# Patient Record
Sex: MALE | Race: WHITE | NOT HISPANIC OR LATINO | Employment: OTHER | URBAN - METROPOLITAN AREA
[De-identification: names, ages, dates, MRNs, and addresses within clinical notes are randomized per-mention and may not be internally consistent; named-entity substitution may affect disease eponyms.]

---

## 2018-05-18 ENCOUNTER — HOSPITAL ENCOUNTER (INPATIENT)
Facility: HOSPITAL | Age: 73
LOS: 4 days | Discharge: HOME | DRG: 189 | End: 2018-05-22
Attending: EMERGENCY MEDICINE | Admitting: INTERNAL MEDICINE
Payer: COMMERCIAL

## 2018-05-18 ENCOUNTER — APPOINTMENT (EMERGENCY)
Dept: RADIOLOGY | Facility: HOSPITAL | Age: 73
DRG: 189 | End: 2018-05-18
Payer: COMMERCIAL

## 2018-05-18 DIAGNOSIS — J96.00 ACUTE RESPIRATORY FAILURE (HCC): ICD-10-CM

## 2018-05-18 DIAGNOSIS — J96.22 ACUTE ON CHRONIC RESPIRATORY FAILURE WITH HYPOXIA AND HYPERCAPNIA (HCC): ICD-10-CM

## 2018-05-18 DIAGNOSIS — J44.1 COPD WITH ACUTE EXACERBATION (HCC): ICD-10-CM

## 2018-05-18 DIAGNOSIS — A41.9 SEPSIS (HCC): Primary | ICD-10-CM

## 2018-05-18 DIAGNOSIS — J44.9 COPD (CHRONIC OBSTRUCTIVE PULMONARY DISEASE) (HCC): ICD-10-CM

## 2018-05-18 DIAGNOSIS — J96.21 ACUTE ON CHRONIC RESPIRATORY FAILURE WITH HYPOXIA AND HYPERCAPNIA (HCC): ICD-10-CM

## 2018-05-18 PROBLEM — Z87.891 HISTORY OF TOBACCO USE: Status: ACTIVE | Noted: 2018-05-18

## 2018-05-18 PROBLEM — R06.82 TACHYPNEA: Status: ACTIVE | Noted: 2018-05-18

## 2018-05-18 PROBLEM — I25.10 CORONARY ARTERY DISEASE WITHOUT ANGINA PECTORIS: Chronic | Status: ACTIVE | Noted: 2018-05-18

## 2018-05-18 PROBLEM — R00.0 TACHYCARDIA: Status: ACTIVE | Noted: 2018-05-18

## 2018-05-18 LAB
ALBUMIN SERPL BCP-MCNC: 3.4 G/DL (ref 3.5–5)
ALP SERPL-CCNC: 89 U/L (ref 46–116)
ALT SERPL W P-5'-P-CCNC: 20 U/L (ref 12–78)
ANION GAP SERPL CALCULATED.3IONS-SCNC: 12 MMOL/L (ref 4–13)
APTT PPP: 37 SECONDS (ref 24–36)
ARTERIAL PATENCY WRIST A: ABNORMAL
AST SERPL W P-5'-P-CCNC: 20 U/L (ref 5–45)
ATRIAL RATE: 106 BPM
ATRIAL RATE: 106 BPM
ATRIAL RATE: 300 BPM
BACTERIA UR QL AUTO: ABNORMAL /HPF
BASE EXCESS BLDA CALC-SCNC: -5 MMOL/L (ref -2–3)
BASOPHILS # BLD AUTO: 0.02 THOUSANDS/ΜL (ref 0–0.1)
BASOPHILS NFR BLD AUTO: 0 % (ref 0–1)
BILIRUB SERPL-MCNC: 2.1 MG/DL (ref 0.2–1)
BILIRUB UR QL STRIP: ABNORMAL
BUN SERPL-MCNC: 23 MG/DL (ref 5–25)
CA-I BLD-SCNC: 1.19 MMOL/L (ref 1.12–1.32)
CALCIUM SERPL-MCNC: 9 MG/DL (ref 8.3–10.1)
CHLORIDE SERPL-SCNC: 92 MMOL/L (ref 100–108)
CLARITY UR: CLEAR
CO2 SERPL-SCNC: 27 MMOL/L (ref 21–32)
COLOR UR: ABNORMAL
CREAT SERPL-MCNC: 0.98 MG/DL (ref 0.6–1.3)
DEPRECATED D DIMER PPP: 581 NG/ML (FEU) (ref 0–424)
EOSINOPHIL # BLD AUTO: 0 THOUSAND/ΜL (ref 0–0.61)
EOSINOPHIL NFR BLD AUTO: 0 % (ref 0–6)
ERYTHROCYTE [DISTWIDTH] IN BLOOD BY AUTOMATED COUNT: 14.4 % (ref 11.6–15.1)
FINE GRAN CASTS URNS QL MICRO: ABNORMAL /LPF
FIO2 GAS DIL.REBREATH: 40 L
GFR SERPL CREATININE-BSD FRML MDRD: 77 ML/MIN/1.73SQ M
GLUCOSE SERPL-MCNC: 115 MG/DL (ref 65–140)
GLUCOSE SERPL-MCNC: 116 MG/DL (ref 65–140)
GLUCOSE UR STRIP-MCNC: NEGATIVE MG/DL
HCO3 BLDA-SCNC: 22.5 MMOL/L (ref 22–28)
HCT VFR BLD AUTO: 39.2 % (ref 36.5–49.3)
HCT VFR BLD CALC: 36 % (ref 36.5–49.3)
HGB BLD-MCNC: 12.9 G/DL (ref 12–17)
HGB BLDA-MCNC: 12.2 G/DL (ref 12–17)
HGB UR QL STRIP.AUTO: ABNORMAL
HYALINE CASTS #/AREA URNS LPF: ABNORMAL /LPF
INR PPP: 1.24 (ref 0.86–1.17)
KETONES UR STRIP-MCNC: ABNORMAL MG/DL
LACTATE SERPL-SCNC: 1.6 MMOL/L (ref 0.5–2)
LACTATE SERPL-SCNC: 2.1 MMOL/L (ref 0.5–2)
LEUKOCYTE ESTERASE UR QL STRIP: NEGATIVE
LYMPHOCYTES # BLD AUTO: 1.17 THOUSANDS/ΜL (ref 0.6–4.47)
LYMPHOCYTES NFR BLD AUTO: 10 % (ref 14–44)
MAGNESIUM SERPL-MCNC: 1.9 MG/DL (ref 1.6–2.6)
MCH RBC QN AUTO: 30.9 PG (ref 26.8–34.3)
MCHC RBC AUTO-ENTMCNC: 32.9 G/DL (ref 31.4–37.4)
MCV RBC AUTO: 94 FL (ref 82–98)
MONOCYTES # BLD AUTO: 2.09 THOUSAND/ΜL (ref 0.17–1.22)
MONOCYTES NFR BLD AUTO: 17 % (ref 4–12)
MUCOUS THREADS UR QL AUTO: ABNORMAL
NEUTROPHILS # BLD AUTO: 9.09 THOUSANDS/ΜL (ref 1.85–7.62)
NEUTS SEG NFR BLD AUTO: 73 % (ref 43–75)
NITRITE UR QL STRIP: NEGATIVE
NON-SQ EPI CELLS URNS QL MICRO: ABNORMAL /HPF
NT-PROBNP SERPL-MCNC: 7728 PG/ML
P AXIS: 84 DEGREES
P AXIS: 91 DEGREES
PCO2 BLD: 24 MMOL/L (ref 21–32)
PCO2 BLD: 50.6 MM HG (ref 36–44)
PH BLD: 7.26 [PH] (ref 7.35–7.45)
PH UR STRIP.AUTO: 5.5 [PH] (ref 4.5–8)
PLATELET # BLD AUTO: 199 THOUSANDS/UL (ref 149–390)
PMV BLD AUTO: 10.8 FL (ref 8.9–12.7)
PO2 BLD: 105 MM HG (ref 75–129)
POTASSIUM BLD-SCNC: 3.8 MMOL/L (ref 3.5–5.3)
POTASSIUM SERPL-SCNC: 4.4 MMOL/L (ref 3.5–5.3)
PR INTERVAL: 154 MS
PROCALCITONIN SERPL-MCNC: 0.27 NG/ML
PROT SERPL-MCNC: 7.9 G/DL (ref 6.4–8.2)
PROT UR STRIP-MCNC: >=300 MG/DL
PROTHROMBIN TIME: 15.2 SECONDS (ref 11.8–14.2)
QRS AXIS: 91 DEGREES
QRS AXIS: 91 DEGREES
QRS AXIS: 97 DEGREES
QRSD INTERVAL: 100 MS
QRSD INTERVAL: 108 MS
QRSD INTERVAL: 112 MS
QT INTERVAL: 306 MS
QT INTERVAL: 322 MS
QT INTERVAL: 326 MS
QTC INTERVAL: 406 MS
QTC INTERVAL: 421 MS
QTC INTERVAL: 433 MS
RBC # BLD AUTO: 4.17 MILLION/UL (ref 3.88–5.62)
RBC #/AREA URNS AUTO: ABNORMAL /HPF
SAMPLE SITE: 4
SAO2 % BLD FROM PO2: 97 % (ref 95–98)
SODIUM BLD-SCNC: 129 MMOL/L (ref 136–145)
SODIUM SERPL-SCNC: 131 MMOL/L (ref 136–145)
SP GR UR STRIP.AUTO: >=1.03 (ref 1–1.03)
SPECIMEN SOURCE: ABNORMAL
T WAVE AXIS: -68 DEGREES
T WAVE AXIS: -77 DEGREES
T WAVE AXIS: -78 DEGREES
TROPONIN I SERPL-MCNC: <0.02 NG/ML
TSH SERPL DL<=0.05 MIU/L-ACNC: 0.69 UIU/ML (ref 0.36–3.74)
UROBILINOGEN UR QL STRIP.AUTO: 2 E.U./DL
VENTRICULAR RATE: 103 BPM
VENTRICULAR RATE: 106 BPM
VENTRICULAR RATE: 106 BPM
WBC # BLD AUTO: 12.37 THOUSAND/UL (ref 4.31–10.16)
WBC #/AREA URNS AUTO: ABNORMAL /HPF

## 2018-05-18 PROCEDURE — 93010 ELECTROCARDIOGRAM REPORT: CPT | Performed by: INTERNAL MEDICINE

## 2018-05-18 PROCEDURE — 80053 COMPREHEN METABOLIC PANEL: CPT | Performed by: PHYSICIAN ASSISTANT

## 2018-05-18 PROCEDURE — 94660 CPAP INITIATION&MGMT: CPT

## 2018-05-18 PROCEDURE — 81001 URINALYSIS AUTO W/SCOPE: CPT

## 2018-05-18 PROCEDURE — 82947 ASSAY GLUCOSE BLOOD QUANT: CPT

## 2018-05-18 PROCEDURE — 93005 ELECTROCARDIOGRAM TRACING: CPT

## 2018-05-18 PROCEDURE — 94668 MNPJ CHEST WALL SBSQ: CPT

## 2018-05-18 PROCEDURE — 87040 BLOOD CULTURE FOR BACTERIA: CPT | Performed by: PHYSICIAN ASSISTANT

## 2018-05-18 PROCEDURE — 83880 ASSAY OF NATRIURETIC PEPTIDE: CPT | Performed by: PHYSICIAN ASSISTANT

## 2018-05-18 PROCEDURE — 84484 ASSAY OF TROPONIN QUANT: CPT | Performed by: PHYSICIAN ASSISTANT

## 2018-05-18 PROCEDURE — 36600 WITHDRAWAL OF ARTERIAL BLOOD: CPT

## 2018-05-18 PROCEDURE — 96365 THER/PROPH/DIAG IV INF INIT: CPT

## 2018-05-18 PROCEDURE — 99285 EMERGENCY DEPT VISIT HI MDM: CPT

## 2018-05-18 PROCEDURE — 85025 COMPLETE CBC W/AUTO DIFF WBC: CPT | Performed by: PHYSICIAN ASSISTANT

## 2018-05-18 PROCEDURE — 83735 ASSAY OF MAGNESIUM: CPT | Performed by: PHYSICIAN ASSISTANT

## 2018-05-18 PROCEDURE — 96361 HYDRATE IV INFUSION ADD-ON: CPT

## 2018-05-18 PROCEDURE — 85610 PROTHROMBIN TIME: CPT | Performed by: PHYSICIAN ASSISTANT

## 2018-05-18 PROCEDURE — 36415 COLL VENOUS BLD VENIPUNCTURE: CPT | Performed by: PHYSICIAN ASSISTANT

## 2018-05-18 PROCEDURE — 99223 1ST HOSP IP/OBS HIGH 75: CPT | Performed by: INTERNAL MEDICINE

## 2018-05-18 PROCEDURE — 85730 THROMBOPLASTIN TIME PARTIAL: CPT | Performed by: PHYSICIAN ASSISTANT

## 2018-05-18 PROCEDURE — 87631 RESP VIRUS 3-5 TARGETS: CPT | Performed by: PHYSICIAN ASSISTANT

## 2018-05-18 PROCEDURE — 94644 CONT INHLJ TX 1ST HOUR: CPT

## 2018-05-18 PROCEDURE — 71045 X-RAY EXAM CHEST 1 VIEW: CPT

## 2018-05-18 PROCEDURE — 85379 FIBRIN DEGRADATION QUANT: CPT | Performed by: PHYSICIAN ASSISTANT

## 2018-05-18 PROCEDURE — 83605 ASSAY OF LACTIC ACID: CPT | Performed by: PHYSICIAN ASSISTANT

## 2018-05-18 PROCEDURE — 94640 AIRWAY INHALATION TREATMENT: CPT

## 2018-05-18 PROCEDURE — 82330 ASSAY OF CALCIUM: CPT

## 2018-05-18 PROCEDURE — 84443 ASSAY THYROID STIM HORMONE: CPT | Performed by: PHYSICIAN ASSISTANT

## 2018-05-18 PROCEDURE — 96375 TX/PRO/DX INJ NEW DRUG ADDON: CPT

## 2018-05-18 PROCEDURE — 94645 CONT INHLJ TX EACH ADDL HOUR: CPT

## 2018-05-18 PROCEDURE — 94760 N-INVAS EAR/PLS OXIMETRY 1: CPT

## 2018-05-18 PROCEDURE — 85014 HEMATOCRIT: CPT

## 2018-05-18 PROCEDURE — 84295 ASSAY OF SERUM SODIUM: CPT

## 2018-05-18 PROCEDURE — 84145 PROCALCITONIN (PCT): CPT | Performed by: PHYSICIAN ASSISTANT

## 2018-05-18 PROCEDURE — 84132 ASSAY OF SERUM POTASSIUM: CPT

## 2018-05-18 PROCEDURE — 82803 BLOOD GASES ANY COMBINATION: CPT

## 2018-05-18 RX ORDER — METHYLPREDNISOLONE SODIUM SUCCINATE 125 MG/2ML
60 INJECTION, POWDER, LYOPHILIZED, FOR SOLUTION INTRAMUSCULAR; INTRAVENOUS EVERY 8 HOURS SCHEDULED
Status: DISCONTINUED | OUTPATIENT
Start: 2018-05-18 | End: 2018-05-20

## 2018-05-18 RX ORDER — ALBUTEROL SULFATE 2.5 MG/3ML
2.5 SOLUTION RESPIRATORY (INHALATION) EVERY 6 HOURS PRN
COMMUNITY
End: 2018-09-24

## 2018-05-18 RX ORDER — ASPIRIN 81 MG/1
81 TABLET ORAL DAILY
Status: DISCONTINUED | OUTPATIENT
Start: 2018-05-19 | End: 2018-05-22 | Stop reason: HOSPADM

## 2018-05-18 RX ORDER — FUROSEMIDE 10 MG/ML
40 INJECTION INTRAMUSCULAR; INTRAVENOUS ONCE
Status: COMPLETED | OUTPATIENT
Start: 2018-05-18 | End: 2018-05-18

## 2018-05-18 RX ORDER — LEVALBUTEROL 1.25 MG/.5ML
1.25 SOLUTION, CONCENTRATE RESPIRATORY (INHALATION)
Status: DISCONTINUED | OUTPATIENT
Start: 2018-05-18 | End: 2018-05-18

## 2018-05-18 RX ORDER — LISINOPRIL 5 MG/1
5 TABLET ORAL DAILY
COMMUNITY
End: 2018-06-28

## 2018-05-18 RX ORDER — CARVEDILOL 12.5 MG/1
12.5 TABLET ORAL 2 TIMES DAILY WITH MEALS
Status: DISCONTINUED | OUTPATIENT
Start: 2018-05-18 | End: 2018-05-22 | Stop reason: HOSPADM

## 2018-05-18 RX ORDER — FUROSEMIDE 20 MG/1
20 TABLET ORAL DAILY
COMMUNITY
End: 2018-06-28

## 2018-05-18 RX ORDER — ALBUTEROL SULFATE 2.5 MG/3ML
10 SOLUTION RESPIRATORY (INHALATION) ONCE
Status: COMPLETED | OUTPATIENT
Start: 2018-05-18 | End: 2018-05-18

## 2018-05-18 RX ORDER — LEVALBUTEROL 1.25 MG/.5ML
1.25 SOLUTION, CONCENTRATE RESPIRATORY (INHALATION)
Status: DISCONTINUED | OUTPATIENT
Start: 2018-05-18 | End: 2018-05-20

## 2018-05-18 RX ORDER — METHYLPREDNISOLONE SODIUM SUCCINATE 40 MG/ML
40 INJECTION, POWDER, LYOPHILIZED, FOR SOLUTION INTRAMUSCULAR; INTRAVENOUS EVERY 8 HOURS SCHEDULED
Status: DISCONTINUED | OUTPATIENT
Start: 2018-05-18 | End: 2018-05-18

## 2018-05-18 RX ORDER — MAGNESIUM SULFATE HEPTAHYDRATE 40 MG/ML
2 INJECTION, SOLUTION INTRAVENOUS ONCE
Status: COMPLETED | OUTPATIENT
Start: 2018-05-18 | End: 2018-05-18

## 2018-05-18 RX ORDER — ROSUVASTATIN CALCIUM 10 MG/1
10 TABLET, COATED ORAL DAILY
COMMUNITY
End: 2018-06-28 | Stop reason: ALTCHOICE

## 2018-05-18 RX ORDER — METHYLPREDNISOLONE SODIUM SUCCINATE 125 MG/2ML
125 INJECTION, POWDER, LYOPHILIZED, FOR SOLUTION INTRAMUSCULAR; INTRAVENOUS ONCE
Status: COMPLETED | OUTPATIENT
Start: 2018-05-18 | End: 2018-05-18

## 2018-05-18 RX ORDER — ALBUTEROL SULFATE 2.5 MG/3ML
SOLUTION RESPIRATORY (INHALATION)
Status: COMPLETED
Start: 2018-05-18 | End: 2018-05-18

## 2018-05-18 RX ORDER — PRAVASTATIN SODIUM 40 MG
40 TABLET ORAL
Status: DISCONTINUED | OUTPATIENT
Start: 2018-05-18 | End: 2018-05-22 | Stop reason: HOSPADM

## 2018-05-18 RX ORDER — LORAZEPAM 2 MG/ML
0.5 INJECTION INTRAMUSCULAR ONCE
Status: COMPLETED | OUTPATIENT
Start: 2018-05-18 | End: 2018-05-18

## 2018-05-18 RX ORDER — CARVEDILOL 12.5 MG/1
12.5 TABLET ORAL 2 TIMES DAILY WITH MEALS
COMMUNITY
End: 2019-09-23 | Stop reason: SDUPTHER

## 2018-05-18 RX ORDER — FUROSEMIDE 20 MG/1
20 TABLET ORAL 2 TIMES DAILY
Status: DISCONTINUED | OUTPATIENT
Start: 2018-05-18 | End: 2018-05-20

## 2018-05-18 RX ORDER — ASPIRIN 81 MG/1
81 TABLET ORAL DAILY
COMMUNITY
End: 2019-04-04 | Stop reason: ALTCHOICE

## 2018-05-18 RX ORDER — LISINOPRIL 5 MG/1
5 TABLET ORAL DAILY
Status: DISCONTINUED | OUTPATIENT
Start: 2018-05-19 | End: 2018-05-22 | Stop reason: HOSPADM

## 2018-05-18 RX ORDER — MORPHINE SULFATE 2 MG/ML
1 INJECTION, SOLUTION INTRAMUSCULAR; INTRAVENOUS ONCE
Status: COMPLETED | OUTPATIENT
Start: 2018-05-18 | End: 2018-05-18

## 2018-05-18 RX ADMIN — MAGNESIUM SULFATE HEPTAHYDRATE 2 G: 40 INJECTION, SOLUTION INTRAVENOUS at 16:28

## 2018-05-18 RX ADMIN — SODIUM CHLORIDE 1100 ML: 0.9 INJECTION, SOLUTION INTRAVENOUS at 13:51

## 2018-05-18 RX ADMIN — ALBUTEROL SULFATE 10 MG: 2.5 SOLUTION RESPIRATORY (INHALATION) at 15:00

## 2018-05-18 RX ADMIN — LORAZEPAM 0.5 MG: 2 INJECTION INTRAMUSCULAR; INTRAVENOUS at 16:58

## 2018-05-18 RX ADMIN — MORPHINE SULFATE 1 MG: 2 INJECTION, SOLUTION INTRAMUSCULAR; INTRAVENOUS at 18:28

## 2018-05-18 RX ADMIN — Medication 1 MG: at 12:11

## 2018-05-18 RX ADMIN — ALBUTEROL SULFATE 10 MG: 2.5 SOLUTION RESPIRATORY (INHALATION) at 12:11

## 2018-05-18 RX ADMIN — METHYLPREDNISOLONE SODIUM SUCCINATE 125 MG: 125 INJECTION, POWDER, FOR SOLUTION INTRAMUSCULAR; INTRAVENOUS at 12:27

## 2018-05-18 RX ADMIN — SODIUM CHLORIDE 1000 ML: 0.9 INJECTION, SOLUTION INTRAVENOUS at 12:27

## 2018-05-18 RX ADMIN — AZITHROMYCIN MONOHYDRATE 500 MG: 500 INJECTION, POWDER, LYOPHILIZED, FOR SOLUTION INTRAVENOUS at 15:00

## 2018-05-18 RX ADMIN — IPRATROPIUM BROMIDE 1 MG: 0.5 SOLUTION RESPIRATORY (INHALATION) at 15:00

## 2018-05-18 RX ADMIN — CEFTRIAXONE 1000 MG: 1 INJECTION, SOLUTION INTRAVENOUS at 13:37

## 2018-05-18 RX ADMIN — IPRATROPIUM BROMIDE 1 MG: 0.5 SOLUTION RESPIRATORY (INHALATION) at 12:11

## 2018-05-18 RX ADMIN — LEVALBUTEROL HYDROCHLORIDE 1.25 MG: 1.25 SOLUTION, CONCENTRATE RESPIRATORY (INHALATION) at 20:09

## 2018-05-18 RX ADMIN — IPRATROPIUM BROMIDE 0.5 MG: 0.5 SOLUTION RESPIRATORY (INHALATION) at 20:09

## 2018-05-18 RX ADMIN — FUROSEMIDE 40 MG: 10 INJECTION, SOLUTION INTRAMUSCULAR; INTRAVENOUS at 18:29

## 2018-05-18 RX ADMIN — METHYLPREDNISOLONE SODIUM SUCCINATE 60 MG: 125 INJECTION, POWDER, FOR SOLUTION INTRAMUSCULAR; INTRAVENOUS at 20:17

## 2018-05-18 NOTE — SEPSIS NOTE
Sepsis Note   Nakita Moscoso 71 y o  male MRN: [id number]  Unit/Bed#: ED 09 Encounter: [id number]            Initial Sepsis Screening     Row Name 05/18/18 8887                Is the patient's history suggestive of a new or worsening infection? (!)  Yes (Proceed)  -JG        Suspected source of infection pneumonia  -JG        Are two or more of the following signs & symptoms of infection both present and new to the patient?         Indicate SIRS criteria Tachycardia > 90 bpm;Tachypnea > 20 resp per min;Leukocytosis (WBC > 23652 IJL)  -JG        If the answer is yes to both questions, suspicion of sepsis is present          If severe sepsis is present AND tissue hypoperfusion perists in the hour after fluid resuscitation or lactate > 4, the patient meets criteria for SEPTIC SHOCK          Are any of the following organ dysfunction criteria present within 6 hours of suspected infection and SIRS criteria that are NOT considered to be chronic conditions? (!)  Yes  -JG        Organ dysfunction Lactate > 2 0 mmol/L;Acute respiratory failure (new need for invasive or non-invasive mechanical ventilation)  -JG        Date of presentation of severe sepsis          Time of presentation of severe sepsis          Tissue hypoperfusion persists in the hour after crystalloid fluid administration, evidenced, by either:          Was hypotension present within one hour of the conclusion of crystalloid fluid administration?           Date of presentation of septic shock          Time of presentation of septic shock            User Key  (r) = Recorded By, (t) = Taken By, (c) = Cosigned By    [address] Name Provider Type    [address] PA-C Physician Assistant

## 2018-05-18 NOTE — H&P
History and Physical - Critical Care   Taras Ruiz 71 y o  male MRN: [id number]  Unit/Bed#: ED 09 Encounter: [id number]    Reason for Admission / Chief Complaint:   Acute / Chronic Hypoxic / Hypercarbic Respiratory Failure  /    Shortness of Breath    History of Present Illness:  Taras Ruiz is a 71 y o  male who presents to [facility] emergency room from home with the chief complaint of shortness of breath  Patient reports that he has an extensive past medical history as he was a previous smoker quitting 1 year ago  He states that he is on continual home oxygen of 2 5 L but has not been increasing usage  He reports over the course of this past week he has had increasing cough with shortness of breath despite using his DuoNeb nebulized breathing treatments as well as his Advair  He states up until earlier today his cough was nonproductive however is at this time producing white to yellow and at times green sputum  He denies any fevers, chills, chest pain, leg pain or swelling  He presented to ER for further evaluation  Upon arrival in the emergency department patient was on his 2 5 L via nasal cannula noting an SpO2 of 77%  Patient was placed on BiPAP after an ABG was obtained revealing  7 25/50/105  Chest x-ray was obtained and they were concerned regarding a pneumonia patient was provided ceftriaxone and azithromycin  Additionally initial lactate of 2 1 patient was provided IV fluids  He was provided 125 mg of Solu-Medrol as well as 10 mg albuterol and Atrovent breathing treatment  Since completion patient reports that he is feeling better  Patient was presented to the medical critical care team for further evaluation and hospital admission  History obtained from the patient      Past Medical History:  Past Medical History:   Diagnosis Date    COPD [facility]chronic obstructive pulmonary disease) [facility] [address] [facility]     [facility]        Past Surgical History:  Past Surgical History: Procedure Laterality Date    WISDOM TOOTH EXTRACTION         Past Family History:  History reviewed  No pertinent family history  Social History:  History   Smoking Status    Former Smoker   Smokeless Tobacco    Never Used     History   Alcohol Use    2 4 oz/week    4 Glasses of wine per week     History   Drug Use No     Marital Status: Single  Exercise History: None    Medications:  Current Facility-Administered Medications   Medication Dose Route Frequency    azithromycin (ZITHROMAX) 500 mg in sodium chloride 0 9% 250mL IVPB 500 mg  500 mg Intravenous Once    ipratropium (ATROVENT) 0 02 % inhalation solution 0 5 mg  0 5 mg Nebulization Q6H    levalbuterol (XOPENEX) inhalation solution 1 25 mg  1 25 mg Nebulization Q6H    methylPREDNISolone sodium succinate (Solu-MEDROL) injection 40 mg  40 mg Intravenous Q8H [address]    sodium chloride 0 9 % bolus 1,100 mL  1,100 mL Intravenous Once     Home medications:  Prior to Admission medications    Not on File     Allergies:  No Known Allergies    ROS:   Review of Systems   Constitutional: Positive for activity change  Negative for appetite change, chills, diaphoresis, fatigue, fever and unexpected weight change  HENT: Negative for congestion, dental problem, drooling, ear pain, postnasal drip, rhinorrhea, sinus pain, sinus pressure, sneezing, sore throat and trouble swallowing  Eyes: Negative  Respiratory: Positive for cough and shortness of breath  Negative for apnea, choking, chest tightness, wheezing and stridor  Cardiovascular: Negative for chest pain, palpitations and leg swelling  Gastrointestinal: Negative for abdominal distention, abdominal pain, diarrhea, nausea and vomiting  Endocrine: Negative  Genitourinary: Negative for difficulty urinating, dysuria, frequency and urgency  Musculoskeletal: Negative for arthralgias, back pain, myalgias and neck pain  Skin: Negative  Allergic/Immunologic: Negative      Neurological: Negative for dizziness and headaches  Hematological: Negative  Psychiatric/Behavioral: Negative  Vitals:  Vitals:    18 1154 18 1206 18 1211 18 1300   BP: 138/70   140/90   BP Location: Left arm      Pulse: (!) 108   (!) 106   Resp: (!) 30      Temp:  97 7 °F (36 5 °C)     TempSrc:  Oral     SpO2:   93% 96%   Weight:  69 1 kg (152 lb 5 4 oz)       Temperature:   Temp (24hrs), Av 7 °F (36 5 °C), Min:97 7 °F (36 5 °C), Max:97 7 °F (36 5 °C)    Current Temperature: 97 7 °F (36 5 °C)    Weights:   IBW: -88 kg  There is no height or weight on file to calculate BMI  Hemodynamic Monitoring:  N/A     Non-Invasive/Invasive Ventilation Settings:  Respiratory    Lab Data (Last 4 hours)    None         O2/Vent Data (Last 4 hours)       1211          Non-Invasive Ventilation Mode BiPAP                 No results found for: PHART, OWZ0LXX, PO2ART, VLM8GUO, G6DZQRYS, BEART, SOURCE  SpO2: SpO2: 96 %     Physical Exam:  Physical Exam     General: 70 y/o M in ER bed room 9 with son at bedside  Currently on BiPAP appearing tachypnic, however awake, alert and oriented  HEENT: Normocephalic, atraumatic  PERRL, EOMI, sclera anicteric, conjunctiva pink, oropharynx patent, mucous membranes dry, tolerating secretions  Neck: Supple, trachea midline, no cervical adenopathy  Heart: RRR without murmur, rub, or gallop, although apically tachycardic  Lungs: currently on BiPAP, clear and equal all fields bilaterally post bronchodilator therapy, slightly decreased; however no wheezing currently, no rales or rhonchi  Abd: Soft, non-tender, no guarding, rebound, or peritoneal signs, active bs noted  Ext: moving upper and lower extremities, distal pulses equal all fields bilaterally, no homans sign, no edema  Back: no CVA tenderness  Neuro: GCS 15, conscious, alert and oriented   Non-focal exam    Labs:    Results from last 7 days  Lab Units 18  1252 18  1219   WBC Thousand/uL  --  12 37* HEMOGLOBIN g/dL  --  12 9   I STAT HEMOGLOBIN g/dl 12 2  --    HEMATOCRIT %  --  39 2   PLATELETS Thousands/uL  --  199   NEUTROS PCT %  --  73   MONOS PCT %  --  17*       Results from last 7 days  Lab Units 18  1252 18  1219   SODIUM mmol/L  --  131*   POTASSIUM mmol/L  --  4 4   CHLORIDE mmol/L  --  92*   CO2 mmol/L  --  27   BUN mg/dL  --  23   CREATININE mg/dL  --  0 98   CALCIUM mg/dL  --  9 0   ANION GAP mmol/L  --  12   ALBUMIN g/dL  --  3 4*   BILIRUBIN TOTAL mg/dL  --  2 10*   ALK PHOS U/L  --  89   ALT U/L  --  20   AST U/L  --  20   GLUCOSE RANDOM mg/dL  --  116   GLUCOSE, ISTAT mg/dl 115  --        Results from last 7 days  Lab Units 18  1219   MAGNESIUM mg/dL 1 9        Results from last 7 days  Lab Units 18  1219   INR  1 24*   PTT seconds 37*       Results from last 7 days  Lab Units 18  1219   TROPONIN I ng/mL <0 02       Results from last 7 days  Lab Units 18  1219   LACTIC ACID mmol/L 2 1*     AB 25/50/105    Imaging: CXR: No active cardiopulmonary disease, no appreciated infiltrate I have personally reviewed pertinent reports  and I have personally reviewed pertinent films in PACS  EKG: Telemetry and 12 lead EKG reviewed, Sinus Tachycardia, no STEMI, no ectopy This was personally reviewed by myself  Micro:  No results found for: Renato Jeronimo, WOUNDZACK, [facility]    ______________________________________________________________________    Assessment:     1  Acute / Chronic Hypoxic / Hypercapnic Respiratory failure  2  COPD with Acute exacerbation  3  Bronchospasm  4  Tachypnea  5  Tachycardia  6  Lactic Acidosis  7  CAD  8  History of Tobacco abuse      Plan:     Neuro: continue to monitor mental status, currently no acute issue  Delirium precautions, perform daily CAM-ICU  Perform good sleep hygiene    CV: continual telemetry monitoring, currently HD stable  CAD history s/p Angioplasty many years ago    Initial troponin <0 02     Pulm: continue SpO2 monitoring maintain >88%  s/p bronchodilator therapy patient moving good air and without wheezing; transition to high flow NC  Considered other etiologies of patients symptoms, will obtain proBNP, Ddimer as well as a procalcitonin  CXR obtained on admission reviewed, there does not appear to be evidence of Pneumonia  However true etiology maybe bronchospasm as shortly after transition to HiFlo NC, notified by primary RN patient complaining of not being "able to breath"  Arrived at bedside to find patient without air movement, very tight with intercostal retractions  Immediately placed patient back on BiPAP contacted respiratory therapy for 10mg Albuterol / 1mg Atrovent stat  Patient has already received 125mg solumedrol, will continue 60mg q8h, Xopenex / Atrovent q6h and closely monitor  Patient does report previously on chronic home steroids; however discontinued in Dec 2017 2/2 weight gain  Additionally reported to have new patient visit with Dr Fco White in July  In lieu of clinical decompensation above believing bronchospasm, will hold off on Chest CT at this time  GI: NPO at this time 2/2 BiPAP    : strict I/O and U/O monitoring    F/E/N: replete lytes prn, IV maintenance fluids not necessary    ID: monitor temps as well as WBC count, CXR does not reveal PNA  Patient provided ceftriaxone and Azithromycin in ER, doubtful of infectious etiology at this time  Will continue to monitor off antibiotics  Will obtain urine strep and legionella and follow up blood culture data    Heme: monitor Hgb/Plt, place on Lovenox chemical DVT PPx as well as bilateral venodynes    Endo: no DM history, will monitor serum glucose    Msk/Skin: reposition and turn q2h while in bed, encourage early ambulation / oob to chair    Disposition: admit [facility] service of Dr Margarito Branham    The above Assessment/Plan discussed with him and he is in agreement as outlined above    Counseling / Coordination of Care  Total time spent today 52 minutes  Greater than 50% of total time was spent with the patient and / or family counseling and / or coordination of care  A description of the counseling / coordination of care: review of EMR, development of care and treatment plan as well as direct bedside management of his acute decompensation while in the ER after transition to hiflo NC     ______________________________________________________________________    VTE Pharmacologic Prophylaxis: Enoxaparin (Lovenox)  VTE Mechanical Prophylaxis: sequential compression device    Invasive lines and devices: Invasive Devices     Peripheral Intravenous Line            Peripheral IV 05/18/18 Right Antecubital less than 1 day                Code Status: Level 1 - Full Code  POA:    POLST:      Given critical illness, patient length of stay will require greater than two midnights  Portions of the record may have been created with voice recognition software  Occasional wrong word or "sound a like" substitutions may have occurred due to the inherent limitations of voice recognition software  Read the chart carefully and recognize, using context, where substitutions have occurred        Betty Matias PA-C

## 2018-05-18 NOTE — ED PROVIDER NOTES
History  Chief Complaint   Patient presents with    Shortness of Breath     C/o SOB x2-3 days  Hx of COPD, recently started with a wet cough  Uses O2 continuously at home, having to increase O2  Patient presents emergency room for an exacerbation of his COPD  He states over the past 3 days he has noticed a productive cough with yellow sputum  His cough is very coarse  He has noticed increasing oxygen demand  He normally wears 2 L of nasal cannula and has increased it to 3  He has had increasing shortness of breath that breast and that is worse with exertion  He denies any chest pain  He denies any nasal congestion or postnasal drip  He denies any sore throat  Denies any abdominal pain, nausea, vomiting, diarrhea or constipation  He denies any black tarry stools or bright red blood per rectum  His pulse ox upon arrival was 77%  He was immediately placed on a CPAP and is tolerating the CPAP well  He was given a heart neb  Past medical history is positive for COPD and a previous MI  History provided by:  Patient  Shortness of Breath   Severity:  Severe  Onset quality:  Gradual  Timing:  Constant  Progression:  Worsening  Chronicity:  Chronic  Context: activity and URI    Relieved by:  Nothing  Worsened by:  Deep breathing, exertion, activity and coughing  Ineffective treatments:  Oxygen (Nebulizers at home )  Associated symptoms: cough and sputum production    Associated symptoms: no abdominal pain, no chest pain, no claudication, no diaphoresis, no ear pain, no fever, no headaches, no hemoptysis, no neck pain, no PND, no rash, no sore throat, no syncope, no swollen glands, no vomiting and no wheezing    Risk factors: no recent alcohol use, no family hx of DVT, no hx of PE/DVT, no obesity, no oral contraceptive use, no prolonged immobilization, no recent surgery and no tobacco use        Prior to Admission Medications   Prescriptions Last Dose Informant Patient Reported? Taking?    albuterol (2 5 mg/3 mL) 0 083 % nebulizer solution 5/18/2018 at Unknown time  Yes Yes   Sig: Take 2 5 mg by nebulization every 6 (six) hours as needed for wheezing   aspirin (ECOTRIN LOW STRENGTH) 81 mg EC tablet 5/18/2018 at Unknown time  Yes Yes   Sig: Take 81 mg by mouth daily   carvedilol (COREG) 12 5 mg tablet 5/18/2018 at Unknown time  Yes Yes   Sig: Take 12 5 mg by mouth 2 (two) times a day with meals   fluticasone-salmeterol (ADVAIR) 250-50 mcg/dose inhaler 5/18/2018 at Unknown time  Yes Yes   Sig: Inhale 1 puff every 12 (twelve) hours   furosemide (LASIX) 20 mg tablet 5/18/2018 at Unknown time  Yes Yes   Sig: Take 20 mg by mouth 2 (two) times a day   lisinopril (ZESTRIL) 5 mg tablet 5/18/2018 at Unknown time  Yes Yes   Sig: Take 5 mg by mouth daily   rosuvastatin (CRESTOR) 10 MG tablet 5/18/2018 at Unknown time  Yes Yes   Sig: Take 10 mg by mouth daily      Facility-Administered Medications: None       Past Medical History:   Diagnosis Date    COPD [facility]chronic obstructive [facility] disease) ([facility] [address] [facility]     [facility]        Past Surgical History:   Procedure Laterality Date    WISDOM TOOTH EXTRACTION         History reviewed  No pertinent family history  I have reviewed and agree with the history as documented  Social History   Substance Use Topics    Smoking status: Former Smoker    Smokeless tobacco: Never Used    Alcohol use 2 4 oz/week     4 Glasses of wine per week        Review of Systems   Constitutional: Positive for activity change and fatigue  Negative for chills, diaphoresis and fever  HENT: Negative for congestion, ear discharge, ear pain, hearing loss, mouth sores, postnasal drip, rhinorrhea, sneezing, sore throat and trouble swallowing  Eyes: Negative for pain, discharge, redness and itching  Respiratory: Positive for cough, sputum production and shortness of breath  Negative for hemoptysis, chest tightness and wheezing      Cardiovascular: Negative for chest pain, palpitations, claudication, leg swelling, syncope and PND  Gastrointestinal: Negative for abdominal pain, nausea and vomiting  Endocrine: Negative for cold intolerance, heat intolerance, polydipsia, polyphagia and polyuria  Genitourinary: Negative for dysuria, frequency and urgency  Musculoskeletal: Negative for arthralgias, back pain, gait problem and neck pain  Skin: Negative for color change and rash  Neurological: Negative for headaches  Psychiatric/Behavioral: Negative for confusion  All other systems reviewed and are negative  Physical Exam  Physical Exam   Constitutional: He is oriented to person, place, and time  He appears well-developed  He appears distressed  HENT:   Head: Normocephalic  Right Ear: External ear normal    Left Ear: External ear normal    Nose: Nose normal    Mouth/Throat: Oropharynx is clear and moist    Eyes: Conjunctivae are normal  Right eye exhibits no discharge  Left eye exhibits no discharge  Neck: Neck supple  JVD present  No tracheal deviation present  No thyromegaly present  Cardiovascular: Regular rhythm and normal heart sounds  No murmur heard  Tachycardia   Pulmonary/Chest: No stridor  He is in respiratory distress  Distant breath sounds bilaterally  Abdominal: Soft  He exhibits no distension and no mass  There is no tenderness  There is no rebound and no guarding  Musculoskeletal: He exhibits no edema  Lymphadenopathy:     He has no cervical adenopathy  Neurological: He is alert and oriented to person, place, and time  Skin: Skin is warm  Capillary refill takes less than 2 seconds  He is not diaphoretic  Psychiatric: He has a normal mood and affect  His behavior is normal  Judgment and thought content normal    Nursing note and vitals reviewed        Vital Signs  ED Triage Vitals   Temperature Pulse Respirations Blood Pressure SpO2   05/18/18 1206 05/18/18 1154 05/18/18 1154 05/18/18 1154 05/18/18 1151   97 7 °F (36 5 °C) (!) 108 (!) 30 138/70 (!) 77 %      Temp Source Heart Rate Source Patient Position - Orthostatic VS BP Location FiO2 (%)   05/18/18 1206 05/18/18 1154 05/18/18 1154 05/18/18 1154 --   Oral Monitor Sitting Left arm       Pain Score       05/18/18 1452       No Pain           Vitals:    05/18/18 1452 05/18/18 1500 05/18/18 1552 05/18/18 1608   BP: 143/85 143/85 120/83 135/80   Pulse: (!) 110 (!) 108 (!) 107 (!) 111   Patient Position - Orthostatic VS: Sitting Sitting Lying Lying       Visual Acuity      ED Medications  Medications   enoxaparin (LOVENOX) subcutaneous injection 40 mg (not administered)   methylPREDNISolone sodium succinate (Solu-MEDROL) injection 60 mg (not administered)   ipratropium (ATROVENT) 0 02 % inhalation solution 0 5 mg (not administered)   levalbuterol (XOPENEX) inhalation solution 1 25 mg (not administered)   aspirin (ECOTRIN LOW STRENGTH) EC tablet 81 mg (not administered)   carvedilol (COREG) tablet 12 5 mg (not administered)   fluticasone-salmeterol (ADVAIR) 250-50 mcg/dose inhaler 1 puff (not administered)   furosemide (LASIX) tablet 20 mg (not administered)   lisinopril (ZESTRIL) tablet 5 mg (not administered)   pravastatin (PRAVACHOL) tablet 40 mg (not administered)   influenza inactivated quadrivalent vaccine (FLULAVAL) IM injection 0 5 mL (not administered)   albuterol inhalation solution 10 mg (10 mg Nebulization Given 5/18/18 1211)   ipratropium (ATROVENT) 0 02 % inhalation solution 1 mg (1 mg Nebulization Given 5/18/18 1211)   sodium chloride 0 9 % bolus 1,000 mL (0 mL Intravenous Stopped 5/18/18 1506)   methylPREDNISolone sodium succinate (Solu-MEDROL) injection 125 mg (125 mg Intravenous Given 5/18/18 1227)   cefTRIAXone (ROCEPHIN) IVPB (premix) 1,000 mg (0 mg Intravenous Stopped 5/18/18 1509)   azithromycin (ZITHROMAX) 500 mg in sodium chloride 0 9% 250mL IVPB 500 mg (0 mg Intravenous Stopped 5/18/18 1707)   sodium chloride 0 9 % bolus 1,100 mL (0 mL Intravenous Stopped 5/18/18 7248)   albuterol inhalation solution 10 mg (10 mg Nebulization Given 5/18/18 1500)   ipratropium (ATROVENT) 0 02 % inhalation solution 1 mg (1 mg Nebulization Given 5/18/18 1500)   magnesium sulfate 2 g/50 mL IVPB (premix) 2 g (2 g Intravenous New Bag 5/18/18 1628)   LORazepam (ATIVAN) 2 mg/mL injection 0 5 mg (0 5 mg Intravenous Given 5/18/18 1658)       Diagnostic Studies  Results Reviewed     Procedure Component Value Units Date/Time    Procalcitonin [[id number]]  (Abnormal) Collected:  05/18/18 1452    Lab Status:  Final result Specimen:  Blood from Arm, Left Updated:  05/18/18 1706     Procalcitonin 0 27 (H) ng/ml     NT-BNP PRO [[id number]]  (Abnormal) Collected:  05/18/18 1451    Lab Status:  Final result Specimen:  Blood from Arm, Left Updated:  05/18/18 1522     NT-proBNP 7,728 (H) pg/mL     D-dimer, quantitative [[id number]]  (Abnormal) Collected:  05/18/18 1452    Lab Status:  Final result Specimen:  Blood from Arm, Left Updated:  05/18/18 1518     D-Dimer, Quant 581 (H) ng/ml (FEU)     Lactic acid, plasma [[id number]]  (Normal) Collected:  05/18/18 1429    Lab Status:  Final result Specimen:  Blood from Arm, Right Updated:  05/18/18 1452     LACTIC ACID 1 6 mmol/L     Narrative:         Result may be elevated if tourniquet was used during collection      Urine Microscopic [[id number]]  (Abnormal) Collected:  05/18/18 1356    Lab Status:  Final result Specimen:  Urine from Urine, Clean Catch Updated:  05/18/18 1430     RBC, UA None Seen /hpf      WBC, UA 0-1 (A) /hpf      Epithelial Cells Occasional /hpf      Bacteria, UA Moderate (A) /hpf      Hyaline Casts, UA 2-4 (A) /lpf      Fine granular casts 20-30 /lpf      MUCOUS THREADS Occasional (A)    ED Urine Macroscopic [[id number]]  (Abnormal) Collected:  05/18/18 1356    Lab Status:  Final result Specimen:  Urine Updated:  05/18/18 1353     Color, UA Orange     Clarity, UA Clear     pH, UA 5 5     Leukocytes, UA Negative     Nitrite, UA Negative     Protein, UA >=300 (A) mg/dl Glucose, UA Negative mg/dl      Ketones, UA 40 (2+) (A) mg/dl      Urobilinogen, UA 2 0 (A) E U /dl      Bilirubin, UA Interference- unable to analyze (A)     Blood, UA Moderate (A)     Specific Gravity, UA >=1 030    Narrative:       CLINITEK RESULT    Blood culture #1 [[id number]] Collected:  05/18/18 1323    Lab Status: In process Specimen:  Blood from Hand, Right Updated:  05/18/18 1327    Comprehensive metabolic panel [[id number]]  (Abnormal) Collected:  05/18/18 1219    Lab Status:  Final result Specimen:  Blood from Arm, Right Updated:  05/18/18 1258     Sodium 131 (L) mmol/L      Potassium 4 4 mmol/L      Chloride 92 (L) mmol/L      CO2 27 mmol/L      Anion Gap 12 mmol/L      BUN 23 mg/dL      Creatinine 0 98 mg/dL      Glucose 116 mg/dL      Calcium 9 0 mg/dL      AST 20 U/L      ALT 20 U/L      Alkaline Phosphatase 89 U/L      Total Protein 7 9 g/dL      Albumin 3 4 (L) g/dL      Total Bilirubin 2 10 (H) mg/dL      eGFR 77 ml/min/1 73sq m     Narrative:         National Kidney Disease Education Program recommendations are as follows:  GFR calculation is accurate only with a steady state creatinine  Chronic Kidney disease less than 60 ml/min/1 73 sq  meters  Kidney failure less than 15 ml/min/1 73 sq  meters  TSH [[id number]]  (Normal) Collected:  05/18/18 1219    Lab Status:  Final result Specimen:  Blood from Arm, Right Updated:  05/18/18 1258     TSH 3RD GENERATON 0 687 uIU/mL     Narrative:         Patients undergoing fluorescein dye angiography may retain small amounts of fluorescein in the body for 48-72 hours post procedure  Samples containing fluorescein can produce falsely depressed TSH values  If the patient had this procedure,a specimen should be resubmitted post fluorescein clearance      Magnesium [[id number]]  (Normal) Collected:  05/18/18 1219    Lab Status:  Final result Specimen:  Blood from Arm, Right Updated:  05/18/18 1258     Magnesium 1 9 mg/dL     POCT Blood Gas (CG8+) [[id number]] (Abnormal) Collected:  05/18/18 1252    Lab Status:  Final result Updated:  05/18/18 1257     pH, Art i-STAT 7 256 (L)     pCO2, Art i-STAT 50 6 (H) mm HG      pO2, ART i-STAT 105 0 mm HG      BE, i-STAT -5 (L) mmol/L      HCO3, Art i-STAT 22 5 mmol/L      CO2, i-STAT 24 mmol/L      O2 Sat, i-STAT 97 %      SODIUM, I-STAT 129 (L) mmol/l      Potassium, i-STAT 3 8 mmol/L      Calcium, Ionized i-STAT 1 19 mmol/L      Hct, i-STAT 36 (L) %      Hgb, i-STAT 12 2 g/dl      Glucose, i-STAT 115 mg/dl      POC FIO2 40 L      Specimen Type ARTERIAL     SITE 04     KRISTINE TEST Postive Kristine Test    Lactic acid, plasma [[id number]]  (Abnormal) Collected:  05/18/18 1219    Lab Status:  Final result Specimen:  Blood from Arm, Right Updated:  05/18/18 1256     LACTIC ACID 2 1 (HH) mmol/L     Narrative:         Result may be elevated if tourniquet was used during collection  Protime-INR [[id number]]  (Abnormal) Collected:  05/18/18 1219    Lab Status:  Final result Specimen:  Blood from Arm, Right Updated:  05/18/18 1248     Protime 15 2 (H) seconds      INR 1 24 (H)    APTT [[id number]]  (Abnormal) Collected:  05/18/18 1219    Lab Status:  Final result Specimen:  Blood from Arm, Right Updated:  05/18/18 1248     PTT 37 (H) seconds     Troponin I [[id number]]  (Normal) Collected:  05/18/18 1219    Lab Status:  Final result Specimen:  Blood from Arm, Right Updated:  05/18/18 1247     Troponin I <0 02 ng/mL     Narrative:         [employer] Chemistry analyzer 99% cutoff is > 0 04 ng/mL in network labs    o cTnI 99% cutoff is useful only when applied to patients in the clinical setting of myocardial ischemia  o cTnI 99% cutoff should be interpreted in the context of clinical history, ECG findings and possibly cardiac imaging to establish correct diagnosis  o cTnI 99% cutoff may be suggestive but clearly not indicative of a coronary event without the clinical setting of myocardial ischemia      CBC and differential [[id number]]  (Abnormal) Collected:  05/18/18 1219    Lab Status:  Final result Specimen:  Blood from Arm, Right Updated:  05/18/18 1229     WBC 12 37 (H) Thousand/uL      RBC 4 17 Million/uL      Hemoglobin 12 9 g/dL      Hematocrit 39 2 %      MCV 94 fL      MCH 30 9 pg      MCHC 32 9 g/dL      RDW 14 4 %      MPV 10 8 fL      Platelets 344 Thousands/uL      Neutrophils Relative 73 %      Lymphocytes Relative 10 (L) %      Monocytes Relative 17 (H) %      Eosinophils Relative 0 %      Basophils Relative 0 %      Neutrophils Absolute 9 09 (H) Thousands/µL      Lymphocytes Absolute 1 17 Thousands/µL      Monocytes Absolute 2 09 (H) Thousand/µL      Eosinophils Absolute 0 00 Thousand/µL      Basophils Absolute 0 02 Thousands/µL     Blood culture #2 [[id number]] Collected:  05/18/18 1220    Lab Status: In process Specimen:  Blood from Arm, Right Updated:  05/18/18 1229    Influenza A/B and RSV by PCR (indicated for patients >2 mo of age) [[id number]]     Lab Status:  No result Specimen:  Nasopharyngeal from Nasopharyngeal Swab                  XR chest 1 view portable   ED Interpretation by Quang Freeman PA-C (05/18 1315)   EARLY PNEUMONIA LLL      Final Result by Misha Miller DO (05/18 1329)      Mild interstitial thickening within the lung bases with blunting the costophrenic angles  Findings may represent small pleural effusions  No congestive failure or pneumonia  Hyperinflation suggesting underlying COPD              Workstation performed: [id number]                    Procedures  ECG 12 Lead Documentation  Date/Time: 5/18/2018 1:20 PM  Performed by: Sp Michel  Authorized by: Jamaal Peng     Indications / Diagnosis:  RESPIRATORY DISTRESS  ECG reviewed by me, the ED Provider: yes    Patient location:  ED  Previous ECG:     Previous ECG:  Unavailable    Comparison to cardiac monitor: Yes    Rate:     ECG rate:  103    ECG rate assessment: tachycardic    Rhythm:     Rhythm: sinus tachycardia    Ectopy:     Ectopy: PVCs    QRS:     QRS axis:  Right    QRS intervals: Wide  Conduction:     Conduction: abnormal      Abnormal conduction: complete LBBB    ST segments:     ST segments:  Abnormal  T waves:     T waves comment:  Biphasic T-waves in the inferior lateral leads  CriticalCare Time  Performed by: Balbina Lopez  Authorized by: Trung Sweet     Critical care provider statement:     Critical care time (minutes):  60    Critical care start time:  5/18/2018 12:25 PM    Critical care end time:  5/18/2018 1:25 PM    Critical care was necessary to treat or prevent imminent or life-threatening deterioration of the following conditions:  Respiratory failure and sepsis    Critical care was time spent personally by me on the following activities:  Evaluation of patient's response to treatment, examination of patient, ordering and review of laboratory studies, ordering and review of radiographic studies and re-evaluation of patient's condition    I assumed direction of critical care for this patient from another provider in my specialty: no    Comments:      Patient seen by Dr Harika Mcmillan           Phone Contacts  ED Phone Contact    ED Course                         Initial Sepsis Screening     [address] Name 05/18/18 4997                Is the patient's history suggestive of a new or worsening infection? (!)  Yes (Proceed)  -JG        Suspected source of infection pneumonia  -JG        Are two or more of the following signs & symptoms of infection both present and new to the patient?           Indicate SIRS criteria Tachycardia > 90 bpm;Tachypnea > 20 resp per min;Leukocytosis (WBC > 42770 IJL)  -JG        If the answer is yes to both questions, suspicion of sepsis is present          If severe sepsis is present AND tissue hypoperfusion perists in the hour after fluid resuscitation or lactate > 4, the patient meets criteria for SEPTIC SHOCK          Are any of the following organ dysfunction criteria present within 6 hours of suspected infection and SIRS criteria that are NOT considered to be chronic conditions? (!)  Yes  -JG        Organ dysfunction Lactate > 2 0 mmol/L;Acute respiratory failure (new need for invasive or non-invasive mechanical ventilation)  -JG        Date of presentation of severe sepsis          Time of presentation of severe sepsis          Tissue hypoperfusion persists in the hour after crystalloid fluid administration, evidenced, by either:          Was hypotension present within one hour of the conclusion of crystalloid fluid administration?         Date of presentation of septic shock          Time of presentation of septic shock            User Key  (r) = Recorded By, (t) = Taken By, (c) = Cosigned By    [address] Name Provider Type    [address] PA-C Physician Assistant                  MDM  Number of Diagnoses or Management Options  Acute respiratory failure [facility]): new and requires workup  COPD (chronic obstructive pulmonary disease) ([facility] ): new and requires workup  Sepsis [facility]): new and requires workup     Amount and/or Complexity of Data Reviewed  Clinical lab tests: ordered and reviewed  Tests in the radiology section of CPT®: ordered and reviewed  Tests in the medicine section of CPT®: ordered and reviewed    Risk of Complications, Morbidity, and/or Mortality  Presenting problems: high  Diagnostic procedures: high  Management options: high    Patient Progress  Patient progress: stable    The patient presented with a condition in which there was a high probability of imminent or life-threatening deterioration, and critical care services (excluding separately billable procedures) totalled 30-74 minutes          Disposition  Final diagnoses:   Sepsis ([facility] [address] )   COP[facility]ch[facility] ([facility] )   Acute respiratory failure ([facility] )     Time reflects when diagnosis was documented in both MDM as applicable and the Disposition within this note     Time User Action Codes Description Comment    5/18/2018  3:03 PM Javi Nunez Add [A41 9] Sepsis ([facility] )     5/18/2018  3:04 PM Javi Nunez Add [J44 1,  W33 974] Acute exacerbation of COPD with asthma ([place] [address] )     5/18/2018  3:04 PM Javi Nunez Remove [Y43 2,  U73 855] Acute exacerbation of COPD with asthma ([place] [address] )     5/18/2018  3:04 PM Javi Nunez Add [J44 9] COPD (chronic obstructive pulmonary disease) ([place] [address] )     5/18/2018  3:04 PM Javi Nunez Add [J96 00] Acute respiratory failure [facility])       ED Disposition     ED Disposition Condition Comment    Admit  Case was discussed with Doris Gómez  and the patient's admission status was agreed to be Admission Status: inpatient status to the service of Dr Ara Cr   Follow-up Information    None         Current Discharge Medication List      CONTINUE these medications which have NOT CHANGED    Details   albuterol (2 5 mg/3 mL) 0 083 % nebulizer solution Take 2 5 mg by nebulization every 6 (six) hours as needed for wheezing      aspirin (ECOTRIN LOW STRENGTH) 81 mg EC tablet Take 81 mg by mouth daily      carvedilol (COREG) 12 5 mg tablet Take 12 5 mg by mouth 2 (two) times a day with meals      fluticasone-salmeterol (ADVAIR) 250-50 mcg/dose inhaler Inhale 1 puff every 12 (twelve) hours      furosemide (LASIX) 20 mg tablet Take 20 mg by mouth 2 (two) times a day      lisinopril (ZESTRIL) 5 mg tablet Take 5 mg by mouth daily      rosuvastatin (CRESTOR) 10 MG tablet Take 10 mg by mouth daily           No discharge procedures on file      ED Provider  Electronically Signed by           Ivette Blackman PA-C  05/18/18 [address], PA-C  05/18/18 0727

## 2018-05-18 NOTE — ED ATTENDING ATTESTATION
Yeni Valencia MD, saw and evaluated the patient  I have discussed the patient with the resident/non-physician practitioner and agree with the resident's/non-physician practitioner's findings, Plan of Care, and MDM as documented in the resident's/non-physician practitioner's note, except where noted  All available labs and Radiology studies were reviewed  At this point I agree with the current assessment done in the Emergency Department  I have conducted an independent evaluation of this patient a history and physical is as follows:      Critical Care Time  CritCare Time    Procedures     Patient is a 73-year-old male, shortness of breath for the past several days, history of COPD, reports with symptoms similar to prior COPD exacerbations  No fevers, chills, sweats  He does have a mild cough  No vomiting or diarrhea  Medical decision m[age]-year-old male, slight elevation in lactic acid, given his COPD exacerbation, will treat with antibiotics, also, he is currently on BiPAP, feeling better  Admit  Lungs with wheezing throughout, no longer in resp distress as of my time of evaluation  Abd s/nt

## 2018-05-19 LAB
ANION GAP SERPL CALCULATED.3IONS-SCNC: 9 MMOL/L (ref 4–13)
ARTERIAL PATENCY WRIST A: YES
BASE EXCESS BLDA CALC-SCNC: -2 MMOL/L (ref -2–3)
BASE EXCESS BLDA CALC-SCNC: -2.9 MMOL/L
BASOPHILS # BLD AUTO: 0.02 THOUSANDS/ΜL (ref 0–0.1)
BASOPHILS NFR BLD AUTO: 0 % (ref 0–1)
BUN SERPL-MCNC: 26 MG/DL (ref 5–25)
CA-I BLD-SCNC: 1.06 MMOL/L (ref 1.12–1.32)
CA-I BLD-SCNC: 1.18 MMOL/L (ref 1.12–1.32)
CALCIUM SERPL-MCNC: 8.3 MG/DL (ref 8.3–10.1)
CHLORIDE SERPL-SCNC: 96 MMOL/L (ref 100–108)
CO2 SERPL-SCNC: 28 MMOL/L (ref 21–32)
CREAT SERPL-MCNC: 1.1 MG/DL (ref 0.6–1.3)
EOSINOPHIL # BLD AUTO: 0 THOUSAND/ΜL (ref 0–0.61)
EOSINOPHIL NFR BLD AUTO: 0 % (ref 0–6)
ERYTHROCYTE [DISTWIDTH] IN BLOOD BY AUTOMATED COUNT: 14.4 % (ref 11.6–15.1)
FLUAV AG SPEC QL: NORMAL
FLUBV AG SPEC QL: NORMAL
GFR SERPL CREATININE-BSD FRML MDRD: 67 ML/MIN/1.73SQ M
GLUCOSE SERPL-MCNC: 139 MG/DL (ref 65–140)
GLUCOSE SERPL-MCNC: 156 MG/DL (ref 65–140)
HCO3 BLDA-SCNC: 23.8 MMOL/L (ref 22–28)
HCO3 BLDA-SCNC: 26.6 MMOL/L (ref 22–28)
HCT VFR BLD AUTO: 35.1 % (ref 36.5–49.3)
HCT VFR BLD CALC: 35 % (ref 36.5–49.3)
HGB BLD-MCNC: 11.3 G/DL (ref 12–17)
HGB BLDA-MCNC: 11.9 G/DL (ref 12–17)
IPAP: 16
LACTATE SERPL-SCNC: 1.6 MMOL/L (ref 0.5–2)
LYMPHOCYTES # BLD AUTO: 0.68 THOUSANDS/ΜL (ref 0.6–4.47)
LYMPHOCYTES NFR BLD AUTO: 7 % (ref 14–44)
MAGNESIUM SERPL-MCNC: 2.4 MG/DL (ref 1.6–2.6)
MCH RBC QN AUTO: 30.6 PG (ref 26.8–34.3)
MCHC RBC AUTO-ENTMCNC: 32.2 G/DL (ref 31.4–37.4)
MCV RBC AUTO: 95 FL (ref 82–98)
MONOCYTES # BLD AUTO: 1.53 THOUSAND/ΜL (ref 0.17–1.22)
MONOCYTES NFR BLD AUTO: 16 % (ref 4–12)
NEUTROPHILS # BLD AUTO: 7.47 THOUSANDS/ΜL (ref 1.85–7.62)
NEUTS SEG NFR BLD AUTO: 77 % (ref 43–75)
NON VENT- BIPAP: ABNORMAL
O2 CT BLDA-SCNC: 15.9 ML/DL (ref 16–23)
OXYHGB MFR BLDA: 96.3 % (ref 94–97)
PCO2 BLD: 29 MMOL/L (ref 21–32)
PCO2 BLD: 65.5 MM HG (ref 36–44)
PCO2 BLDA: 50 MM HG (ref 36–44)
PEEP MAX SETTING VENT: 6 CM[H2O]
PH BLD: 7.22 [PH] (ref 7.35–7.45)
PH BLDA: 7.3 [PH] (ref 7.35–7.45)
PHOSPHATE SERPL-MCNC: 3.7 MG/DL (ref 2.3–4.1)
PLATELET # BLD AUTO: 178 THOUSANDS/UL (ref 149–390)
PMV BLD AUTO: 10.6 FL (ref 8.9–12.7)
PO2 BLD: 95 MM HG (ref 75–129)
PO2 BLDA: 109.1 MM HG (ref 75–129)
POTASSIUM BLD-SCNC: 3.7 MMOL/L (ref 3.5–5.3)
POTASSIUM SERPL-SCNC: 4 MMOL/L (ref 3.5–5.3)
RBC # BLD AUTO: 3.69 MILLION/UL (ref 3.88–5.62)
RSV B RNA SPEC QL NAA+PROBE: NORMAL
SAO2 % BLD FROM PO2: 95 % (ref 95–98)
SODIUM BLD-SCNC: 132 MMOL/L (ref 136–145)
SODIUM SERPL-SCNC: 133 MMOL/L (ref 136–145)
SPECIMEN SOURCE: ABNORMAL
SPECIMEN SOURCE: ABNORMAL
VENT BIPAP FIO2: 40 %
WBC # BLD AUTO: 9.7 THOUSAND/UL (ref 4.31–10.16)

## 2018-05-19 PROCEDURE — 83735 ASSAY OF MAGNESIUM: CPT | Performed by: INTERNAL MEDICINE

## 2018-05-19 PROCEDURE — 36600 WITHDRAWAL OF ARTERIAL BLOOD: CPT

## 2018-05-19 PROCEDURE — 94760 N-INVAS EAR/PLS OXIMETRY 1: CPT

## 2018-05-19 PROCEDURE — 82330 ASSAY OF CALCIUM: CPT | Performed by: INTERNAL MEDICINE

## 2018-05-19 PROCEDURE — 82803 BLOOD GASES ANY COMBINATION: CPT

## 2018-05-19 PROCEDURE — 85014 HEMATOCRIT: CPT

## 2018-05-19 PROCEDURE — 94664 DEMO&/EVAL PT USE INHALER: CPT

## 2018-05-19 PROCEDURE — 84295 ASSAY OF SERUM SODIUM: CPT

## 2018-05-19 PROCEDURE — 84100 ASSAY OF PHOSPHORUS: CPT | Performed by: INTERNAL MEDICINE

## 2018-05-19 PROCEDURE — 99291 CRITICAL CARE FIRST HOUR: CPT | Performed by: INTERNAL MEDICINE

## 2018-05-19 PROCEDURE — 82330 ASSAY OF CALCIUM: CPT

## 2018-05-19 PROCEDURE — 94669 MECHANICAL CHEST WALL OSCILL: CPT

## 2018-05-19 PROCEDURE — 82805 BLOOD GASES W/O2 SATURATION: CPT | Performed by: PHYSICIAN ASSISTANT

## 2018-05-19 PROCEDURE — 80048 BASIC METABOLIC PNL TOTAL CA: CPT | Performed by: INTERNAL MEDICINE

## 2018-05-19 PROCEDURE — 94640 AIRWAY INHALATION TREATMENT: CPT

## 2018-05-19 PROCEDURE — 83605 ASSAY OF LACTIC ACID: CPT | Performed by: INTERNAL MEDICINE

## 2018-05-19 PROCEDURE — 94668 MNPJ CHEST WALL SBSQ: CPT

## 2018-05-19 PROCEDURE — 82947 ASSAY GLUCOSE BLOOD QUANT: CPT

## 2018-05-19 PROCEDURE — 85025 COMPLETE CBC W/AUTO DIFF WBC: CPT | Performed by: INTERNAL MEDICINE

## 2018-05-19 PROCEDURE — 87205 SMEAR GRAM STAIN: CPT | Performed by: PHYSICIAN ASSISTANT

## 2018-05-19 PROCEDURE — 94660 CPAP INITIATION&MGMT: CPT

## 2018-05-19 PROCEDURE — 87070 CULTURE OTHR SPECIMN AEROBIC: CPT | Performed by: PHYSICIAN ASSISTANT

## 2018-05-19 PROCEDURE — 84132 ASSAY OF SERUM POTASSIUM: CPT

## 2018-05-19 RX ORDER — LEVALBUTEROL 1.25 MG/.5ML
1.25 SOLUTION, CONCENTRATE RESPIRATORY (INHALATION) EVERY 6 HOURS PRN
Status: DISCONTINUED | OUTPATIENT
Start: 2018-05-19 | End: 2018-05-20

## 2018-05-19 RX ORDER — ALBUTEROL SULFATE 2.5 MG/3ML
2.5 SOLUTION RESPIRATORY (INHALATION) EVERY 6 HOURS PRN
Status: DISCONTINUED | OUTPATIENT
Start: 2018-05-19 | End: 2018-05-19

## 2018-05-19 RX ADMIN — PRAVASTATIN SODIUM 40 MG: 40 TABLET ORAL at 17:23

## 2018-05-19 RX ADMIN — LEVALBUTEROL HYDROCHLORIDE 1.25 MG: 1.25 SOLUTION, CONCENTRATE RESPIRATORY (INHALATION) at 15:05

## 2018-05-19 RX ADMIN — IPRATROPIUM BROMIDE 0.5 MG: 0.5 SOLUTION RESPIRATORY (INHALATION) at 01:45

## 2018-05-19 RX ADMIN — LEVALBUTEROL HYDROCHLORIDE 1.25 MG: 1.25 SOLUTION, CONCENTRATE RESPIRATORY (INHALATION) at 01:45

## 2018-05-19 RX ADMIN — IPRATROPIUM BROMIDE 0.5 MG: 0.5 SOLUTION RESPIRATORY (INHALATION) at 21:28

## 2018-05-19 RX ADMIN — IPRATROPIUM BROMIDE 0.5 MG: 0.5 SOLUTION RESPIRATORY (INHALATION) at 00:20

## 2018-05-19 RX ADMIN — METHYLPREDNISOLONE SODIUM SUCCINATE 60 MG: 125 INJECTION, POWDER, FOR SOLUTION INTRAMUSCULAR; INTRAVENOUS at 22:32

## 2018-05-19 RX ADMIN — IPRATROPIUM BROMIDE 0.5 MG: 0.5 SOLUTION RESPIRATORY (INHALATION) at 15:05

## 2018-05-19 RX ADMIN — FUROSEMIDE 20 MG: 20 TABLET ORAL at 17:23

## 2018-05-19 RX ADMIN — LEVALBUTEROL HYDROCHLORIDE 1.25 MG: 1.25 SOLUTION, CONCENTRATE RESPIRATORY (INHALATION) at 11:31

## 2018-05-19 RX ADMIN — METHYLPREDNISOLONE SODIUM SUCCINATE 60 MG: 125 INJECTION, POWDER, FOR SOLUTION INTRAMUSCULAR; INTRAVENOUS at 05:30

## 2018-05-19 RX ADMIN — LEVALBUTEROL HYDROCHLORIDE 1.25 MG: 1.25 SOLUTION, CONCENTRATE RESPIRATORY (INHALATION) at 18:15

## 2018-05-19 RX ADMIN — LISINOPRIL 5 MG: 5 TABLET ORAL at 09:45

## 2018-05-19 RX ADMIN — FLUTICASONE PROPIONATE AND SALMETEROL 1 PUFF: 50; 250 POWDER RESPIRATORY (INHALATION) at 09:52

## 2018-05-19 RX ADMIN — METHYLPREDNISOLONE SODIUM SUCCINATE 60 MG: 125 INJECTION, POWDER, FOR SOLUTION INTRAMUSCULAR; INTRAVENOUS at 13:40

## 2018-05-19 RX ADMIN — CARVEDILOL 12.5 MG: 12.5 TABLET, FILM COATED ORAL at 09:54

## 2018-05-19 RX ADMIN — ASPIRIN 81 MG: 81 TABLET, COATED ORAL at 09:45

## 2018-05-19 RX ADMIN — IPRATROPIUM BROMIDE 0.5 MG: 0.5 SOLUTION RESPIRATORY (INHALATION) at 11:31

## 2018-05-19 RX ADMIN — ENOXAPARIN SODIUM 40 MG: 40 INJECTION SUBCUTANEOUS at 09:45

## 2018-05-19 RX ADMIN — FUROSEMIDE 20 MG: 20 TABLET ORAL at 09:45

## 2018-05-19 RX ADMIN — CARVEDILOL 12.5 MG: 12.5 TABLET, FILM COATED ORAL at 17:23

## 2018-05-19 RX ADMIN — IPRATROPIUM BROMIDE 0.5 MG: 0.5 SOLUTION RESPIRATORY (INHALATION) at 07:19

## 2018-05-19 RX ADMIN — LEVALBUTEROL HYDROCHLORIDE 1.25 MG: 1.25 SOLUTION, CONCENTRATE RESPIRATORY (INHALATION) at 00:20

## 2018-05-19 RX ADMIN — LEVALBUTEROL HYDROCHLORIDE 1.25 MG: 1.25 SOLUTION, CONCENTRATE RESPIRATORY (INHALATION) at 21:28

## 2018-05-19 RX ADMIN — LEVALBUTEROL HYDROCHLORIDE 1.25 MG: 1.25 SOLUTION, CONCENTRATE RESPIRATORY (INHALATION) at 07:19

## 2018-05-19 RX ADMIN — FLUTICASONE PROPIONATE AND SALMETEROL 1 PUFF: 50; 250 POWDER RESPIRATORY (INHALATION) at 20:50

## 2018-05-19 RX ADMIN — IPRATROPIUM BROMIDE 0.5 MG: 0.5 SOLUTION RESPIRATORY (INHALATION) at 18:15

## 2018-05-19 NOTE — PROGRESS NOTES
Progress Note - Critical Care   Chilo Barkley 71 y o  male MRN: [id number]  Unit/Bed#:  Encounter: [id number]    Impression:  Principal Problem:    Acute on chronic respiratory failure with hypoxia and hypercapnia (HCC)  Active Problems:    COPD (chronic obstructive pulmonary disease) (HCC)    Tachypnea    Tachycardia    Coronary artery disease without angina pectoris    History of tobacco use  Resolved Problems:    * No resolved hospital problems  *      Plan:    Neuro:     · Pain controlled with: morphine 1 mg IV  A 6 hours   · Regulate sleep/wake cycle  · Trend neuro exam  · Cam ICU     CV:   · Rhythm: Sinus Tachycardia  · Follow rhythm on telemetry  · ECHO:   Pending   · Lasix 40 mg IV x1 with good response   · Initial troponin was less than 0 02 and history of CAD with angioplasty    Lung:   · Respiratory protocol, HGB greater than 30%  · Pulmonary toileting and IS  · Encourage deep breathing, coughing, and incentive spirometry / flutter valve  · SpO2 goal >88%  · BiPAP / high-flow as tolerated  · Solu-Medrol 125 mg initially 60 mg q 8  · Xopenex and Atrovent q 6 hours  · Xopenex p r n   · Used to be on chronic steroids for his lung problems which was discontinued  · If patient does not improve CTA of the chest to evaluate for PE   · Monitor for fluid status- received 1 dose of Lasix 40 IV for volume overload  · Had problems tolerating BiPAP in the evening was placed on high-flow for couple hours repeated ABG showed hypercapnia placed back on BiPAP and corrected hypercapnia  Per his ABG does not look like the CO2 retainer  GI:   · Continue PPI for stress ulcer prophylaxis  · Continue bowel regimen  · Zofran PRN for nausea  · Diet:  NPO for now    FEN:     · Replete electrolytes with goals: K >4 0, Mag >2 0, and Phos >3 0  · DVT prophylaxis with Lovenox      :   · ITrend UOP and BUN/creat  · Strict I and O  · Diuretic plan: Lasix 20 mg po q 12 - home dose- p r n   Lasix as needed  · Goal 24 hour fluid balance:  Even to -500      ID:   · Trend temps and WBC count  · Maintain normothermia  · No infectious etiology  · Urine strep Legionella and blood cultures are pending    Heme:   · Trend hgb and plts  · Transfuse as needed for goal hgb >7      Endo:   · Glycemic control plan:  No history of diabetes      MSK:  · Frequent turning and pressure off-loading        Disposition:  Step-down level 1        HPI/24hr events:   Multiple events overnight, patient would get up and desaturation, did not tolerate BiPAP started on high-flow nasal cannula tolerated for several hours, when patient became more somnolent Re checked ABG P CO2 was 65, able to start patient back on BiPAP 16/6 with good oxygenation, recheck ABG at 3:00 a m  José Miguel Vargas Physical Exam       Physical Exam   Constitutional: He appears well-developed and well-nourished  No distress  HENT:   Head: Normocephalic and atraumatic  Nose: Nose normal    Eyes: Conjunctivae and EOM are normal  Pupils are equal, round, and reactive to light  Neck: Normal range of motion  Neck supple  No JVD present  No tracheal deviation present  Cardiovascular: Normal rate, regular rhythm, normal heart sounds and intact distal pulses  Exam reveals no gallop and no friction rub  No murmur heard  Pulmonary/Chest: Effort normal  No respiratory distress  He has wheezes  He has no rales  He exhibits no tenderness  Mild respiratory distress   Abdominal: Soft  Bowel sounds are normal  He exhibits no distension and no mass  There is no tenderness  There is no rebound and no guarding  Musculoskeletal: Normal range of motion  He exhibits no edema or tenderness  Neurological: He is alert  He has normal reflexes  No cranial nerve deficit  Coordination normal    Mildly confused   Skin: Skin is warm and dry  No rash noted  He is not diaphoretic  No erythema  No pallor  Psychiatric: He has a normal mood and affect   His behavior is normal  Judgment and thought content normal        Vitals:   Vitals:    18 0035 18 0145 18 0300 18 0415   BP:   110/61    BP Location:   Left arm    Pulse:   89    Resp:   (!) 28    Temp:   (!) 97 4 °F (36 3 °C)    TempSrc:   Axillary    SpO2: 95% 96% 98% 98%   Weight:       Height:                 Tele Rhythm: Sinus Tachycardia This was personally reviewed by myself  Temperature: Temp (24hrs), Av 7 °F (36 5 °C), Min:97 4 °F (36 3 °C), Max:98 °F (36 7 °C)  Current: Temperature: (!) 97 4 °F (36 3 °C)    Weights: IBW: 73 kg  Body mass index is 21 76 kg/m²  Hemodynamic Monitoring:  N/A       Respiratory:  SpO2: SpO2: 98 %, SpO2 Activity: SpO2 Activity: At Rest, SpO2 Device: O2 Device: Other (comment) (Bipap)  O2 Flow Rate (L/min): 55 L/min    Intake and Outputs:    Intake/Output Summary (Last 24 hours) at 18 0446  Last data filed at 18 0343   Gross per 24 hour   Intake             2350 ml   Output             1520 ml   Net              830 ml     I/O last 24 hours: In: 2350 [IV Piggyback:2350]  Out: 9954 [HNNEX:7007]        Nutrition:        Diet Orders            Start     Ordered    18 [address]  Room Service  Once     Question:  Type of Service  Answer:  Room Service - Appropriate with Assistance    18 1632    18 1608  Diet Regular; Regular House  Diet effective now     Question Answer Comment   Diet Type Regular    Regular Regular House    RD to adjust diet per protocol?  Yes        18 1607        Labs:     Results from last 7 days  Lab Units 18  0358 18  0035 18  1252 18  1219   WBC Thousand/uL 9 70  --   --  12 37*   HEMOGLOBIN g/dL 11 3*  --   --  12 9   I STAT HEMOGLOBIN g/dl  --  11 9* 12 2  --    HEMATOCRIT % 35 1*  --   --  39 2   PLATELETS Thousands/uL 178  --   --  199   NEUTROS PCT % 77*  --   --  73   MONOS PCT % 16*  --   --  17*       Results from last 7 days  Lab Units 18  0358 18  0035 18  1252 18  1219   SODIUM mmol/L 133* --   --  131*   POTASSIUM mmol/L 4 0  --   --  4 4   CHLORIDE mmol/L 96*  --   --  92*   CO2 mmol/L 28  --   --  27   BUN mg/dL 26*  --   --  23   CREATININE mg/dL 1 10  --   --  0 98   CALCIUM mg/dL 8 3  --   --  9 0   TOTAL PROTEIN g/dL  --   --   --  7 9   BILIRUBIN TOTAL mg/dL  --   --   --  2 10*   ALK PHOS U/L  --   --   --  89   ALT U/L  --   --   --  20   AST U/L  --   --   --  20   GLUCOSE RANDOM mg/dL 139  --   --  116   GLUCOSE, ISTAT mg/dl  --  156* 115  --        Results from last 7 days  Lab Units 05/19/18  0358 05/18/18  1219   MAGNESIUM mg/dL 2 4 1 9       Results from last 7 days  Lab Units 05/19/18  0358   PHOSPHORUS mg/dL 3 7        Results from last 7 days  Lab Units 05/18/18  1219   INR  1 24*   PTT seconds 37*       Results from last 7 days  Lab Units 05/19/18  0358 05/18/18  1429 05/18/18  1219   LACTIC ACID mmol/L 1 6 1 6 2 1*       0  Lab Value Date/Time   TROPONINI <0 02 05/18/2018 1219     ABG:    Results from last 7 days  Lab Units 05/19/18  0409   PH ART  7 296*   PCO2 ART mm Hg 50 0*   PO2 ART mm Hg 109 1   HCO3 ART mmol/L 23 8   BASE EXC ART mmol/L -2 9   ABG SOURCE  Radial, Left       Imaging:   CXR: clear  I have personally reviewed pertinent reports  ECHO: pending  I have personally reviewed pertinent reports        Micro:   Blood Culture: No results found for: BLOODCX  Urine Culture: No results found for: URINECX  Sputum Culture: No components found for: SPUTUMCX  Wound Culure: No results found for: WOUNDCULT        Allergies: No Known Allergies    Medications:   Scheduled Meds:    Current Facility-Administered Medications:  aspirin 81 mg Oral Daily Fco Teran PA-C   carvedilol 12 5 mg Oral BID With Meals Fco Teran PA-C   enoxaparin 40 mg Subcutaneous Daily Fco Teran PA-C   fluticasone-salmeterol 1 puff Inhalation Q12H [address], PA-C   furosemide 20 mg Oral BID Fco Teran PA-C   influenza vaccine 0 5 mL Intramuscular Prior to discharge Fernande Bosworth, MD   ipratropium 0 5 mg Nebulization Q6H Fernande Bosworth, MD   ipratropium 0 5 mg Nebulization Q6H PRN Fernande Bosworth, MD   levalbuterol 1 25 mg Nebulization Q6H Fernande Bosworth, MD   levalbuterol 1 25 mg Nebulization Q6H PRN Fernande Bosworth, MD   lisinopril 5 mg Oral Daily Divina Galdamez PA-C   methylPREDNISolone sodium succinate 60 mg Intravenous [facility] Divina Galdamez PA-C   pravastatin 40 mg Oral Daily With Karis Mccray PA-C       VTE Pharmacologic Prophylaxis: Sequential compression device (Venodyne)  and Enoxaparin (Lovenox)  VTE Mechanical Prophylaxis: sequential compression device    Continuous Infusions:   PRN Meds:    influenza vaccine 0 5 mL Prior to discharge   ipratropium 0 5 mg Q6H PRN   levalbuterol 1 25 mg Q6H PRN       Invasive lines and devices: Invasive Devices     Peripheral Intravenous Line            Peripheral IV 05/18/18 Right Antecubital less than 1 day    Peripheral IV 05/18/18 Right Wrist less than 1 day                Code Status: Level 1 - Full Code    Counseling / Coordination of Care  Total Critical Care time spent 30 minutes excluding procedures, teaching and family updates        Lay Weaver PA-C  DATE: May 19, 2018  TIME: 4:46 AM

## 2018-05-19 NOTE — CASE MANAGEMENT
Initial Clinical Review    Admission: Date/Time/Statement: 5/18/18 @ 1426     Orders Placed This Encounter   Procedures    Inpatient Admission     Standing Status:   Standing     Number of Occurrences:   1     Order Specific Question:   Admitting Physician     Answer:   Abi Cui [[id number]]     Order Specific Question:   Level of Care     Answer:   Level 1 Stepdown [13]     Order Specific Question:   Estimated length of stay     Answer:   More than 2 Midnights     Order Specific Question:   Certification     Answer:   I certify that inpatient services are medically necessary for this patient for a duration of greater than two midnights  See H&P and MD Progress Notes for additional information about the patient's course of treatment   Inpatient Admission (expected length of stay for this patient is greater than two midnights)     Standing Status:   Standing     Number of Occurrences:   1     Order Specific Question:   Admitting Physician     Answer:   Agustín Jerome     Order Specific Question:   Level of Care     Answer:   Level 1 Stepdown [13]     Order Specific Question:   Estimated length of stay     Answer:   More than 2 Midnights     Order Specific Question:   Certification     Answer:   I certify that inpatient services are medically necessary for this patient for a duration of greater than two midnights  See H&P and MD Progress Notes for additional information about the patient's course of treatment  ED: Date/Time/Mode of Arrival:   ED Arrival Information     Expected Arrival Acuity Means of Arrival Escorted By Service Admission Type    - 5/18/2018 11:40 Emergent Wheelchair Family Member Critical Care/ICU Emergency    Arrival Complaint    Shortness of Breath          Chief Complaint:   Chief Complaint   Patient presents with    Shortness of Breath     C/o SOB x2-3 days  Hx of COPD, recently started with a wet cough  Uses O2 continuously at home, having to increase O2  History of Illness: 67-year-old male who recently moved to the [facility] from [place] who presents with shortness of breath  The patient reports a past medical history significant for tobacco use and Chronic Obstructive Pulmonary Disease for which he was on chronic prednisone  It is unclear the patient was on chronic prednisone for weight loss or Chronic Obstructive Pulmonary Disease based on his history  The patient reports he has been short of breath for several days has had increasing cough and shortness of breath despite using his DuoNeb as well as Advair      In the ED, the patient was started on bipap and the ABG had a mixed respiratory and metabolic acidosis  The patient was tolerating BiPAP difficulty but had acute respiratory insufficiency when transition to high-flow nasal cannula requiring restarting of BiPAP  ED Vital Signs:   ED Triage Vitals   Temperature Pulse Respirations Blood Pressure SpO2   05/18/18 1206 05/18/18 1154 05/18/18 1154 05/18/18 1154 05/18/18 1151   97 7 °F (36 5 °C) (!) 108 (!) 30 138/70 (!) 77 %      Temp Source Heart Rate Source Patient Position - Orthostatic VS BP Location FiO2 (%)   05/18/18 1206 05/18/18 1154 05/18/18 1154 05/18/18 1154 05/18/18 2009   Oral Monitor Sitting Left arm 40      Pain Score       05/18/18 1452       No Pain        Wt Readings from Last 1 Encounters:   05/19/18 66 1 kg (145 lb 11 6 oz)       Vital Signs (abnormal):  Respiratory rate 30 - 24  Sustained pulse 108 - 113  Low sat of 77% on 3 liters  Exam distressed  JVD  Tachycardia  Distant breath sounds bilaterally    Abnormal Labs/Diagnostic Test Results: *  Wbc 12 37   INR 1 24  Lactic acid 2 1  Na 131  Cl 92  Albumin 3 4  Total bilirubin 2 10  procalcitonin 0 27  NT-proBNP 7728  UA moderate blood  2 0 urobilinogen  2+ ketones  >=300 protein     POCT Blood Gas (CG8+) [[id number]] (Abnormal) Collected: 05/18/18 1252   Lab Status: Final result Updated: 05/18/18 1257    pH, Art i-STAT 7 256 (L) 7 350 - 7 450     pCO2, Art i-STAT 50 6 (H) 36 0 - 44 0 mm HG     pO2, ART i-STAT 105 0 75 0 - 129 0 mm HG     BE, i-STAT -5 (L) -2 - 3 mmol/L     HCO3, Art i-STAT 22 5 22 0 - 28 0 mmol/L     CO2, i-STAT 24 21 - 32 mmol/L     O2 Sat, i-STAT 97 95 - 98 %     SODIUM, I-STAT 129 (L) 136 - 145 mmol/l     Potassium, i-STAT 3 8 3 5 - 5 3 mmol/L     Calcium, Ionized i-STAT 1 19 1 12 - 1 32 mmol/L     Hct, i-STAT 36 (L) 36 5 - 49 3 %     Hgb, i-STAT 12 2 12 0 - 17 0 g/dl     Glucose, i-STAT 115 65 - 140 mg/dl     POC FIO2 40 L     Specimen Type ARTERIAL    SITE 04    KRISTINE TEST Postive Kristine Test       POCT Blood Gas (CG8+) [[id number]] (Abnormal) Collected: 05/19/18 0035   Lab Status: Final result Updated: 05/19/18 0045    pH, Art i-STAT 7 218 (LL) 7 350 - 7 450     pCO2, Art i-STAT 65 5 (H) 36 0 - 44 0 mm HG     pO2, ART i-STAT 95 0 75 0 - 129 0 mm HG     BE, i-STAT -2 -2 - 3 mmol/L     HCO3, Art i-STAT 26 6 22 0 - 28 0 mmol/L     CO2, i-STAT 29 21 - 32 mmol/L     O2 Sat, i-STAT 95 95 - 98 %     SODIUM, I-STAT 132 (L) 136 - 145 mmol/l     Potassium, i-STAT 3 7 3 5 - 5 3 mmol/L     Calcium, Ionized i-STAT 1 18 1 12 - 1 32 mmol/L     Hct, i-STAT 35 (L) 36 5 - 49 3 %     Hgb, i-STAT 11 9 (L) 12 0 - 17 0 g/dl     Glucose, i-STAT 156 (H) 65 - 140 mg/dl     Specimen Type ARTERIAL         CxR- Mild interstitial thickening within the lung bases with blunting the costophrenic angles   Findings may represent small pleural effusions   No congestive failure or pneumonia  Hyperinflation suggesting underlying COPD     ekg - Sinus tachycardia with occasional Premature ventricular complexes  Rightward axis  Septal infarct , age undetermined  T wave abnormality, consider inferior ischemia    5/19/2018  Na 133  Cl 96  Bun 26    hgb 11 3, hct 35 1     Blood gas, arterial [[id number]] (Abnormal) Collected: 05/19/18 0400   Lab Status: Final result Specimen: Blood, Arterial from Radial, Left Updated: 05/19/18 7775    pH, Arterial 7 296 (L) 7 350 - 7 450     pCO2, Arterial 50 0 (H) 36 0 - 44 0 mm Hg     pO2, Arterial 109 1 75 0 - 129 0 mm Hg     HCO3, Arterial 23 8 22 0 - 28 0 mmol/L     Base Excess, Arterial -2 9 mmol/L     O2 Content, Arterial 15 9 (L) 16 0 - 23 0 mL/dL     O2 HGB,Arterial  96 3 94 0 - 97 0 %     SOURCE Radial, Left    KRISTINE TEST Yes    Non Vent type BIPAP BIPAP    IPAP 16    EPAP 6       ED Treatment: blood cultures  Oxygen titrated 3 liters to bipap to hfnc and back to bipap  Medication Administration from 05/18/2018 1140 to 05/18/2018 1607       Date/Time Order Dose Route Action Action by Comments     05/18/2018 1211 albuterol inhalation solution 10 mg 10 mg Nebulization Given [employer], RT      05/18/2018 1211 ipratropium (ATROVENT) 0 02 % inhalation solution 1 mg 1 mg Nebulization Given Yaritza Lawler, RT      05/18/2018 1506 sodium chloride 0 9 % bolus 1,000 mL 0 mL Intravenous Stopped Emely Sevilla RN      05/18/2018 1227 sodium chloride 0 9 % bolus 1,000 mL 1,000 mL Intravenous New Bag Emely Sevilla RN      05/18/2018 1227 methylPREDNISolone sodium succinate (Solu-MEDROL) injection 125 mg 125 mg Intravenous Given Emely Sevilla RN      05/18/2018 1509 cefTRIAXone (ROCEPHIN) IVPB (premix) 1,000 mg 0 mg Intravenous Stopped Zach King RN      05/18/2018 1337 cefTRIAXone (ROCEPHIN) IVPB (premix) 1,000 mg 1,000 mg Intravenous New Bag Roxie Rasheed RN      05/18/2018 1500 azithromycin (ZITHROMAX) 500 mg in sodium chloride 0 9% 250mL IVPB 500 mg 500 mg Intravenous New Bag Roxie Rasheed RN      05/18/2018 1506 sodium chloride 0 9 % bolus 1,100 mL 0 mL Intravenous Stopped Roxie Rasheed RN      05/18/2018 1351 sodium chloride 0 9 % bolus 1,100 mL 1,100 mL Intravenous New Bag Emely Sevilla RN      05/18/2018 1530 methylPREDNISolone sodium succinate (Solu-MEDROL) injection 40 mg 40 mg Intravenous Not Given Ej Vinson      05/18/2018 1500 albuterol inhalation solution 10 mg 10 mg Nebulization Given Amira Cabral [facility], RT      05/18/2018 1500 ipratropium (ATROVENT) 0 02 % inhalation solution 1 mg 1 mg Nebulization Given Cheryl Number, RT           Past Medical/Surgical History:    Active Ambulatory Problems     Diagnosis Date Noted    No Active Ambulatory Problems     Resolved Ambulatory Problems     Diagnosis Date Noted    No Resolved Ambulatory Problems     Past Medical History:   Diagnosis Date    COPD [facility]chronic [facility]     [facility]        Admitting Diagnosis: Acute respiratory failure ([facility] ) [J96 00]  Shortness of breath [R06 02]  COPD (chronic obstructive pulmonary disease) ([facility] ) [J44 9]  Sepsis ([facility] ) [A41 9]    Age/Sex: 71 y o  male    Assessment/Plan: Patient presents with shortness most likely related to his COPD   -  Continue solumedrol 60mg IV q8h  -  Continue q6h nebs  -  Has been given a dose of ceftriaxone/azithromycin              -  Will check procalcitonin to decide need to redose  -  BNP elevated; trops normal              -  Creatinine 0 9 - continue lasix  - slightly elevated D-dimer - just above the upper limit of normal              -  Risk/benefit is to hold CTA at this time given low likelihood              -  Wells Score 1 5 (1 3% chance of PE)    Admission Orders:  5/18/2018  1426 INPATIENT   Scheduled Meds:   Current Facility-Administered Medications:  aspirin 81 mg Oral Daily Floresita Aiken PA-C   carvedilol 12 5 mg Oral BID With Meals Floresita Aiken PA-C   enoxaparin 40 mg Subcutaneous Daily Floresita Aiken PA-C   fluticasone-salmeterol 1 puff Inhalation Q12H [address] [place], [place] [address]MICHAEL   furosemide 20 mg Oral BID Floresita Aiken PA-C   influenza vaccine 0 5 mL Intramuscular Prior to discharge David Perez MD   ipratropium 0 5 mg Nebulization Q6H David Perez MD   levalbuterol 1 25 mg Nebulization Q6H David Perez MD   lisinopril 5 mg Oral Daily Floresita Aiken PA-C methylPREDNISolone sodium succinate 60 mg Intravenous [facility] Floresita Aiken PA-C   pravastatin 40 mg Oral Daily With Elgin Green PA-C     Continuous Infusions:    PRN Meds: influenza vaccine    Ipratropium - used x 1      levalbuterol - used x 1  OTHER ORDERS: cardio pulmonary monitoring  scds  Oscillatory positive expiratory pressure tid  Respiratory protocol - high flow nasal cannula  BIPAP - current BIPAP 40 % FIO2   echo      Thank you,  [address] [facility] in the [facility] by Rudi Golden for 2017  Network Utilization Review Department  Phone: [phone]; Fax [phone]  ATTENTION: The Network Utilization Review Department is now centralized for our 7 Facilities  Make a note that we have a new phone and fax numbers for our Department  Please call with any questions or concerns to [phone] and carefully follow the prompts so that you are directed to the right person  All voicemails are confidential  Fax any determinations, approvals, denials, and requests for initial or continue stay review clinical to [phone]  Due to HIGH CALL volume, it would be easier if you could please send faxed requests to expedite your requests and in part, help us provide discharge notifications faster

## 2018-05-20 ENCOUNTER — APPOINTMENT (INPATIENT)
Dept: NON INVASIVE DIAGNOSTICS | Facility: HOSPITAL | Age: 73
DRG: 189 | End: 2018-05-20
Payer: COMMERCIAL

## 2018-05-20 PROBLEM — R06.82 TACHYPNEA: Status: RESOLVED | Noted: 2018-05-18 | Resolved: 2018-05-20

## 2018-05-20 PROBLEM — I50.42 CHRONIC COMBINED SYSTOLIC AND DIASTOLIC CONGESTIVE HEART FAILURE (HCC): Status: ACTIVE | Noted: 2018-05-20

## 2018-05-20 PROBLEM — J44.1 COPD WITH ACUTE EXACERBATION (HCC): Status: ACTIVE | Noted: 2018-05-18

## 2018-05-20 PROBLEM — I50.42 CHRONIC COMBINED SYSTOLIC AND DIASTOLIC CONGESTIVE HEART FAILURE (HCC): Chronic | Status: ACTIVE | Noted: 2018-05-20

## 2018-05-20 PROBLEM — J44.9 COPD (CHRONIC OBSTRUCTIVE PULMONARY DISEASE) (HCC): Chronic | Status: ACTIVE | Noted: 2018-05-18

## 2018-05-20 PROBLEM — R00.0 TACHYCARDIA: Status: RESOLVED | Noted: 2018-05-18 | Resolved: 2018-05-20

## 2018-05-20 LAB
ANION GAP SERPL CALCULATED.3IONS-SCNC: 4 MMOL/L (ref 4–13)
ARTERIAL PATENCY WRIST A: YES
BASE EXCESS BLDA CALC-SCNC: 3.8 MMOL/L
BASOPHILS # BLD AUTO: 0.01 THOUSANDS/ΜL (ref 0–0.1)
BASOPHILS NFR BLD AUTO: 0 % (ref 0–1)
BUN SERPL-MCNC: 28 MG/DL (ref 5–25)
CALCIUM SERPL-MCNC: 8.6 MG/DL (ref 8.3–10.1)
CHLORIDE SERPL-SCNC: 98 MMOL/L (ref 100–108)
CO2 SERPL-SCNC: 34 MMOL/L (ref 21–32)
CREAT SERPL-MCNC: 0.9 MG/DL (ref 0.6–1.3)
EOSINOPHIL # BLD AUTO: 0 THOUSAND/ΜL (ref 0–0.61)
EOSINOPHIL NFR BLD AUTO: 0 % (ref 0–6)
ERYTHROCYTE [DISTWIDTH] IN BLOOD BY AUTOMATED COUNT: 14.5 % (ref 11.6–15.1)
GFR SERPL CREATININE-BSD FRML MDRD: 86 ML/MIN/1.73SQ M
GLUCOSE SERPL-MCNC: 144 MG/DL (ref 65–140)
HCO3 BLDA-SCNC: 31.3 MMOL/L (ref 22–28)
HCT VFR BLD AUTO: 35.6 % (ref 36.5–49.3)
HGB BLD-MCNC: 11.6 G/DL (ref 12–17)
LYMPHOCYTES # BLD AUTO: 0.58 THOUSANDS/ΜL (ref 0.6–4.47)
LYMPHOCYTES NFR BLD AUTO: 6 % (ref 14–44)
MCH RBC QN AUTO: 30.8 PG (ref 26.8–34.3)
MCHC RBC AUTO-ENTMCNC: 32.6 G/DL (ref 31.4–37.4)
MCV RBC AUTO: 94 FL (ref 82–98)
MONOCYTES # BLD AUTO: 1.24 THOUSAND/ΜL (ref 0.17–1.22)
MONOCYTES NFR BLD AUTO: 13 % (ref 4–12)
NASAL CANNULA: 6
NEUTROPHILS # BLD AUTO: 7.73 THOUSANDS/ΜL (ref 1.85–7.62)
NEUTS SEG NFR BLD AUTO: 81 % (ref 43–75)
O2 CT BLDA-SCNC: 15.7 ML/DL (ref 16–23)
OXYHGB MFR BLDA: 96.1 % (ref 94–97)
PCO2 BLDA: 62.5 MM HG (ref 36–44)
PH BLDA: 7.32 [PH] (ref 7.35–7.45)
PLATELET # BLD AUTO: 202 THOUSANDS/UL (ref 149–390)
PMV BLD AUTO: 10.7 FL (ref 8.9–12.7)
PO2 BLDA: 101.9 MM HG (ref 75–129)
POTASSIUM SERPL-SCNC: 4.1 MMOL/L (ref 3.5–5.3)
PROCALCITONIN SERPL-MCNC: 0.73 NG/ML
RBC # BLD AUTO: 3.77 MILLION/UL (ref 3.88–5.62)
SODIUM SERPL-SCNC: 136 MMOL/L (ref 136–145)
SPECIMEN SOURCE: ABNORMAL
WBC # BLD AUTO: 9.56 THOUSAND/UL (ref 4.31–10.16)

## 2018-05-20 PROCEDURE — 82805 BLOOD GASES W/O2 SATURATION: CPT | Performed by: PHYSICIAN ASSISTANT

## 2018-05-20 PROCEDURE — 94668 MNPJ CHEST WALL SBSQ: CPT

## 2018-05-20 PROCEDURE — 94669 MECHANICAL CHEST WALL OSCILL: CPT

## 2018-05-20 PROCEDURE — 94760 N-INVAS EAR/PLS OXIMETRY 1: CPT

## 2018-05-20 PROCEDURE — 84145 PROCALCITONIN (PCT): CPT | Performed by: INTERNAL MEDICINE

## 2018-05-20 PROCEDURE — 36600 WITHDRAWAL OF ARTERIAL BLOOD: CPT

## 2018-05-20 PROCEDURE — 85025 COMPLETE CBC W/AUTO DIFF WBC: CPT | Performed by: PHYSICIAN ASSISTANT

## 2018-05-20 PROCEDURE — 93306 TTE W/DOPPLER COMPLETE: CPT | Performed by: INTERNAL MEDICINE

## 2018-05-20 PROCEDURE — 99291 CRITICAL CARE FIRST HOUR: CPT | Performed by: INTERNAL MEDICINE

## 2018-05-20 PROCEDURE — 80048 BASIC METABOLIC PNL TOTAL CA: CPT | Performed by: PHYSICIAN ASSISTANT

## 2018-05-20 PROCEDURE — 94660 CPAP INITIATION&MGMT: CPT

## 2018-05-20 PROCEDURE — 94664 DEMO&/EVAL PT USE INHALER: CPT

## 2018-05-20 PROCEDURE — 93306 TTE W/DOPPLER COMPLETE: CPT

## 2018-05-20 PROCEDURE — 94640 AIRWAY INHALATION TREATMENT: CPT

## 2018-05-20 RX ORDER — METHYLPREDNISOLONE SODIUM SUCCINATE 125 MG/2ML
60 INJECTION, POWDER, LYOPHILIZED, FOR SOLUTION INTRAMUSCULAR; INTRAVENOUS EVERY 12 HOURS SCHEDULED
Status: DISCONTINUED | OUTPATIENT
Start: 2018-05-20 | End: 2018-05-21

## 2018-05-20 RX ORDER — ALBUTEROL SULFATE 2.5 MG/3ML
2.5 SOLUTION RESPIRATORY (INHALATION)
Status: DISCONTINUED | OUTPATIENT
Start: 2018-05-20 | End: 2018-05-20

## 2018-05-20 RX ORDER — FUROSEMIDE 20 MG/1
20 TABLET ORAL DAILY
Status: DISCONTINUED | OUTPATIENT
Start: 2018-05-21 | End: 2018-05-21

## 2018-05-20 RX ORDER — IPRATROPIUM BROMIDE AND ALBUTEROL SULFATE 2.5; .5 MG/3ML; MG/3ML
3 SOLUTION RESPIRATORY (INHALATION) EVERY 4 HOURS PRN
Status: DISCONTINUED | OUTPATIENT
Start: 2018-05-20 | End: 2018-05-22 | Stop reason: HOSPADM

## 2018-05-20 RX ORDER — IPRATROPIUM BROMIDE AND ALBUTEROL SULFATE 2.5; .5 MG/3ML; MG/3ML
3 SOLUTION RESPIRATORY (INHALATION)
Status: DISCONTINUED | OUTPATIENT
Start: 2018-05-20 | End: 2018-05-22 | Stop reason: HOSPADM

## 2018-05-20 RX ORDER — ALBUTEROL SULFATE 2.5 MG/3ML
2.5 SOLUTION RESPIRATORY (INHALATION) EVERY 6 HOURS PRN
Status: DISCONTINUED | OUTPATIENT
Start: 2018-05-20 | End: 2018-05-20

## 2018-05-20 RX ADMIN — IPRATROPIUM BROMIDE 0.5 MG: 0.5 SOLUTION RESPIRATORY (INHALATION) at 07:06

## 2018-05-20 RX ADMIN — IPRATROPIUM BROMIDE 0.5 MG: 0.5 SOLUTION RESPIRATORY (INHALATION) at 04:53

## 2018-05-20 RX ADMIN — FLUTICASONE PROPIONATE AND SALMETEROL 1 PUFF: 50; 250 POWDER RESPIRATORY (INHALATION) at 21:30

## 2018-05-20 RX ADMIN — LEVALBUTEROL HYDROCHLORIDE 1.25 MG: 1.25 SOLUTION, CONCENTRATE RESPIRATORY (INHALATION) at 01:55

## 2018-05-20 RX ADMIN — LEVALBUTEROL HYDROCHLORIDE 1.25 MG: 1.25 SOLUTION, CONCENTRATE RESPIRATORY (INHALATION) at 11:08

## 2018-05-20 RX ADMIN — CARVEDILOL 12.5 MG: 12.5 TABLET, FILM COATED ORAL at 07:41

## 2018-05-20 RX ADMIN — LISINOPRIL 5 MG: 5 TABLET ORAL at 09:41

## 2018-05-20 RX ADMIN — IPRATROPIUM BROMIDE AND ALBUTEROL SULFATE 3 ML: .5; 3 SOLUTION RESPIRATORY (INHALATION) at 19:21

## 2018-05-20 RX ADMIN — IPRATROPIUM BROMIDE 0.5 MG: 0.5 SOLUTION RESPIRATORY (INHALATION) at 15:10

## 2018-05-20 RX ADMIN — ENOXAPARIN SODIUM 40 MG: 40 INJECTION SUBCUTANEOUS at 09:40

## 2018-05-20 RX ADMIN — CARVEDILOL 12.5 MG: 12.5 TABLET, FILM COATED ORAL at 16:20

## 2018-05-20 RX ADMIN — FUROSEMIDE 20 MG: 20 TABLET ORAL at 09:41

## 2018-05-20 RX ADMIN — METHYLPREDNISOLONE SODIUM SUCCINATE 60 MG: 125 INJECTION, POWDER, FOR SOLUTION INTRAMUSCULAR; INTRAVENOUS at 21:30

## 2018-05-20 RX ADMIN — IPRATROPIUM BROMIDE 0.5 MG: 0.5 SOLUTION RESPIRATORY (INHALATION) at 11:08

## 2018-05-20 RX ADMIN — METHYLPREDNISOLONE SODIUM SUCCINATE 60 MG: 125 INJECTION, POWDER, FOR SOLUTION INTRAMUSCULAR; INTRAVENOUS at 05:14

## 2018-05-20 RX ADMIN — ASPIRIN 81 MG: 81 TABLET, COATED ORAL at 09:41

## 2018-05-20 RX ADMIN — IPRATROPIUM BROMIDE 0.5 MG: 0.5 SOLUTION RESPIRATORY (INHALATION) at 01:55

## 2018-05-20 RX ADMIN — PRAVASTATIN SODIUM 40 MG: 40 TABLET ORAL at 16:20

## 2018-05-20 RX ADMIN — ALBUTEROL SULFATE 2.5 MG: 2.5 SOLUTION RESPIRATORY (INHALATION) at 15:10

## 2018-05-20 RX ADMIN — FLUTICASONE PROPIONATE AND SALMETEROL 1 PUFF: 50; 250 POWDER RESPIRATORY (INHALATION) at 09:41

## 2018-05-20 RX ADMIN — LEVALBUTEROL HYDROCHLORIDE 1.25 MG: 1.25 SOLUTION, CONCENTRATE RESPIRATORY (INHALATION) at 07:06

## 2018-05-20 RX ADMIN — LEVALBUTEROL HYDROCHLORIDE 1.25 MG: 1.25 SOLUTION, CONCENTRATE RESPIRATORY (INHALATION) at 04:54

## 2018-05-20 NOTE — PROGRESS NOTES
Progress Note - Critical Care   Bharat Gerber 71 y o  male MRN: [id number]  Unit/Bed#:  Encounter: [id number]    Impression:  Principal Problem:    Acute on chronic respiratory failure with hypoxia and hypercapnia (HCC)  Active Problems:    COPD (chronic obstructive pulmonary disease) (HCC)    Tachypnea    Tachycardia    Coronary artery disease without angina pectoris    History of tobacco use  Resolved Problems:    * No resolved hospital problems  *      Plan:    Neuro:     · Pain controlled with: morphine 1 mg IV  A 6 hours   · Regulate sleep/wake cycle  · Trend neuro exam  · [facility]      CV:   · Rhythm: Sinus Tachycardia   Follow rhythm on telemetry  · ECHO:   Pending   · Continue lasix 20mg PO bid    ·  history of CAD with angioplasty     Lung:   · Respiratory protocol, HGB greater than 30%  · Pulmonary toileting and IS  · Encourage deep breathing, coughing, and incentive spirometry / flutter valve  · SpO2 goal >88%  · BiPAP / high-flow as tolerated / NC   · Solu-Medrol 125 mg initially 60 mg q 8  · Xopenex and Atrovent q 6 hours  · Xopenex p r n   · Used to be on chronic steroids for his lung problems which was discontinued  · Monitor for fluid status-   ·      GI:   · Continue PPI for stress ulcer prophylaxis  · Continue bowel regimen  · Zofran PRN for nausea  · Diet:  NPO for now     FEN:      · Replete electrolytes with goals: K >4 0, Mag >2 0, and Phos >3 0  · DVT prophylaxis with Lovenox        :   · ITrend UOP and BUN/creat  · Strict I and O  · Diuretic plan: Lasix 20 mg po q 12 - home dose- p r n   Lasix as needed   Goal 24 hour fluid balance:  Even to -500        ID:   · Trend temps and WBC count  · Maintain normothermia  · No infectious etiology  · Urine strep Legionella and blood cultures are pending               Flu negative      Heme:   · Trend hgb and plts   Transfuse as needed for goal hgb >7        Endo:   · Glycemic control plan:  No history of diabetes     MSK:  · Frequent turning and pressure off-loading    D:  Downgrade to step-down level 2 or Med surge tele      24 hour events:     Refused BiPAP approximately 2:30 a m , was placed back on nasal cannula  Continue to monitor hypercapnia    Physical Exam     Physical Exam   Constitutional: He is oriented to person, place, and time  He appears well-developed  No distress  Thin and frail   HENT:   Head: Normocephalic and atraumatic  Eyes: Conjunctivae and EOM are normal  Pupils are equal, round, and reactive to light  No scleral icterus  Neck: Normal range of motion  Neck supple  No JVD present  No tracheal deviation present  Cardiovascular: Normal rate, regular rhythm, normal heart sounds and intact distal pulses  Exam reveals no friction rub  No murmur heard  Pulmonary/Chest: Effort normal  No stridor  No respiratory distress  He has wheezes  He has no rales  Decreased breath sounds bilaterally   Abdominal: Soft  Bowel sounds are normal    Musculoskeletal: Normal range of motion  He exhibits edema  Neurological: He is alert and oriented to person, place, and time  He has normal reflexes  Skin: Skin is warm and dry  He is not diaphoretic  Psychiatric: He has a normal mood and affect  His behavior is normal         Vitals:   Vitals:    18 0240 18 0256 18 0454 18 0531   BP:  144/78     BP Location:  Left arm     Pulse:  95     Resp:  20     Temp:  97 8 °F (36 6 °C)     TempSrc:  Oral     SpO2: 96% 94% 96%    Weight:    64 9 kg (143 lb 1 3 oz)   Height:                 Tele Rhythm: NSR This was personally reviewed by myself  Temperature: Temp (24hrs), Av °F (36 7 °C), Min:97 6 °F (36 4 °C), Max:98 5 °F (36 9 °C)  Current: Temperature: 97 8 °F (36 6 °C)    Weights: IBW: 73 kg  Body mass index is 20 53 kg/m²      Hemodynamic Monitoring:  N/A       Respiratory:  SpO2: SpO2: 96 %, SpO2 Activity: SpO2 Activity: At Rest, SpO2 Device: O2 Device: Nasal cannula  O2 Flow Rate (L/min): 6 L/min    Intake and Outputs:    Intake/Output Summary (Last 24 hours) at 05/20/18 0546  Last data filed at 05/20/18 0509   Gross per 24 hour   Intake              660 ml   Output             1825 ml   Net            -1165 ml     I/O last 24 hours: In: 660 [P O :660]  Out: 3025 [[id number]]        Nutrition:        Diet Orders            Start     Ordered    05/18/18 [address]  Room Service  Once     Question:  Type of Service  Answer:  Room Service - Appropriate with Assistance    05/18/18 1632    05/18/18 1608  Diet Regular; Regular House  Diet effective now     Question Answer Comment   Diet Type Regular    Regular Regular House    RD to adjust diet per protocol? Yes        05/18/18 1607          Labs:     Results from last 7 days  Lab Units 05/20/18 0513 05/19/18 0358 05/19/18 0035  05/18/18  1219   WBC Thousand/uL 9 56 9 70  --   --  12 37*   HEMOGLOBIN g/dL 11 6* 11 3*  --   --  12 9   I STAT HEMOGLOBIN g/dl  --   --  11 9*  < >  --    HEMATOCRIT % 35 6* 35 1*  --   --  39 2   PLATELETS Thousands/uL 202 178  --   --  199   NEUTROS PCT % 81* 77*  --   --  73   MONOS PCT % 13* 16*  --   --  17*   < > = values in this interval not displayed  Results from last 7 days  Lab Units 05/20/18 0513 05/19/18 0358 05/19/18 0035  05/18/18  1219   SODIUM mmol/L 136 133*  --   --  131*   POTASSIUM mmol/L 4 1 4 0  --   --  4 4   CHLORIDE mmol/L 98* 96*  --   --  92*   CO2 mmol/L 34* 28  --   --  27   BUN mg/dL 28* 26*  --   --  23   CREATININE mg/dL 0 90 1 10  --   --  0 98   CALCIUM mg/dL 8 6 8 3  --   --  9 0   TOTAL PROTEIN g/dL  --   --   --   --  7 9   BILIRUBIN TOTAL mg/dL  --   --   --   --  2 10*   ALK PHOS U/L  --   --   --   --  89   ALT U/L  --   --   --   --  20   AST U/L  --   --   --   --  20   GLUCOSE RANDOM mg/dL 144* 139  --   --  116   GLUCOSE, ISTAT mg/dl  --   --  156*  < >  --    < > = values in this interval not displayed      Results from last 7 days  Lab Units 05/19/18  0358 05/18/18  1219   MAGNESIUM mg/dL 2 4 1 9 Results from last 7 days  Lab Units 05/19/18  0358   PHOSPHORUS mg/dL 3 7        Results from last 7 days  Lab Units 05/18/18  1219   INR  1 24*   PTT seconds 37*       Results from last 7 days  Lab Units 05/19/18  0358 05/18/18  1429 05/18/18  1219   LACTIC ACID mmol/L 1 6 1 6 2 1*       0  Lab Value Date/Time   TROPONINI <0 02 05/18/2018 1219     ABG:    Results from last 7 days  Lab Units 05/20/18  0448 05/19/18  0409   PH ART  7 318* 7 296*   PCO2 ART mm Hg 62 5* 50 0*   PO2 ART mm Hg 101 9 109 1   HCO3 ART mmol/L 31 3* 23 8   BASE EXC ART mmol/L 3 8 -2 9   ABG SOURCE  Radial, Left Radial, Left       Imaging:   CXR: stable  I have personally reviewed pertinent reports  ECHO: P I have personally reviewed pertinent reports  Micro:   Blood Culture:   Lab Results   Component Value Date    BLOODCX No Growth at 24 hrs  05/18/2018    BLOODCX No Growth at 24 hrs  05/18/2018     Urine Culture: No results found for: URINECX  Sputum Culture: No components found for: SPUTUMCX  Wound Culure: No results found for: WOUNDCULT    Results from last 7 days  Lab Units 05/18/18  2018 05/18/18  1323 05/18/18  1220   BLOOD CULTURE   --  No Growth at 24 hrs  No Growth at 24 hrs     INFLUENZA A PCR  None Detected  --   --    INFLUENZA B PCR  None Detected  --   --    RSV PCR  None Detected  --   --        Allergies: No Known Allergies    Medications:   Scheduled Meds:    Current Facility-Administered Medications:  aspirin 81 mg Oral Daily Jeanethjames Manifold PA-C   carvedilol 12 5 mg Oral BID [facility]   enoxaparin 40 mg Subcutaneous Daily Charjames Manifold PA-C   fluticasone-salmeterol 1 puff Inhalation Q12H [address] [facility]   furosemide 20 mg Oral BID Adele Mendoza PA-C   influenza vaccine 0 5 mL Intramuscular Prior to discharge Liban Claudio MD   ipratropium 0 5 mg Nebulization Wong Whitley MD   ipratropium 0 5 mg Nebulization Q6H PRN Liban Claudio MD   levalbuterol 1 25 mg Nebulization Q6H Julia Franco MD   levalbuterol 1 25 mg Nebulization Q6H PRN Julia Franco MD   lisinopril 5 mg Oral Daily Nicole Baez PA-C   methylPREDNISolone sodium succinate 60 mg Intravenous [facility] Nicole Baez PA-C   pravastatin 40 mg Oral Daily With Concepción Stover PA-C       VTE Pharmacologic Prophylaxis: Sequential compression device (Venodyne)  and Heparin  VTE Mechanical Prophylaxis: sequential compression device    Continuous Infusions:   PRN Meds:    influenza vaccine 0 5 mL Prior to discharge   ipratropium 0 5 mg Q6H PRN   levalbuterol 1 25 mg Q6H PRN       Invasive lines and devices: Invasive Devices     Peripheral Intravenous Line            Peripheral IV 05/18/18 Right Antecubital 1 day    Peripheral IV 05/18/18 Right Wrist 1 day                Code Status: Level 1 - Full Code    Counseling / Coordination of Care  Total Critical Care time spent 30 minutes excluding procedures, teaching and family updates        Cathy Tompkins PA-C  DATE: May 20, 2018  TIME: 5:46 AM

## 2018-05-20 NOTE — PROGRESS NOTES
Progress Note - ICU Transfer to Step down/med  surg  Bobbi Porras 71 y o  male MRN: [id number]    Unit/Bed#:  Encounter: [id number]    Code Status: Level 1 - Full Code  POA:    POLST:      Reason for ICU adm:  Acute exacerbation of COPD    Active problems:   Patient Active Problem List   Diagnosis    Acute on chronic respiratory failure with hypoxia and hypercapnia (HCC)    Coronary artery disease without angina pectoris    History of tobacco use    COPD (chronic obstructive pulmonary disease) ([facility] [address] )       Consultants:  Pulmonary on transfer out of the ICU    History of Present Illness/Summary of clinical course:  78-year-old male with past medical history of COPD chronic 2 point and 0 5 L nasal cannula, chronic steroids who presented on 05/18 with increasing shortness of breath/sputum production over the last 3 days  Chest x-ray with no obvious infiltrate  Patient was hypoxic in the 70s requiring BiPAP and high-flow nasal cannula  ABG revealed 7 2/65/95/26/95%  BNP 7700  Procalcitonin was noted to be 0 27 and patient had initially received ceftriaxone/azithromycin in the ER but antibiotics were held  He was placed on IV steroids which have been further titrated down to 60 mg b i d  today  He was weaned off high-flow nasal cannula and is to continue BiPAP HS however has an inability to tolerate this in which the respiratory therapist will be monitoring to adjust as deemed overnight tonight      Recent or scheduled procedures:   5/18 blood cultures no growth  5/18 flu culture negative  5/18 chest x-ray mild interstitial thickening/hyper inflation suggesting COPD  5/20 echo with an EF of 30-35% akinesis of the anterior septal apical walls grade 1 diastolic dysfunction    Outstanding/pending diagnostics:   5/19 sputum culture pending       Mobilization Plan:  Out of bed PT evaluation pending    Nutrition Plan:  Regular diet    Discharge Plan:    Patient should be ready for discharge to pending PT evaluation after transition to oral steroids   Initial PT/OT/ST Recommendations:  Pending   Initial /Plan:  Following     Specific Diagnosis Plan:    Acute on chronic hypoxic/hypercapnic respiratory failure secondary to acute exacerbation of COPD-Xopenex/Atrovent/Advair, steroids changed to 60 mg IV b i d  today plan for Q 2 day taper, prior notes no chronic prednisone will need to follow up as these are not listed on his home med list, Pulmonary to follow on transfer out of the ICU, oxygen for saturations greater than 88%, BiPAP HS this is new for the patient    Chronic systolic CHF/ CAD/hypertension/hyperlipidemia  Echo on 05/20 with an EF of 30-35% akinesis of the anterior septal apical walls with grade 1 diastolic dysfunction-continue aspirin/Coreg/lisinopril/statin/Lasix      Portions of the record may have been created with voice recognition software  Occasional wrong word or "sound a like" substitutions may have occurred due to the inherent limitations of voice recognition software  Read the chart carefully and recognize, using context, where substitutions have occurred      DANIEL Hernandez

## 2018-05-21 PROBLEM — E87.2 LACTIC ACIDOSIS: Status: ACTIVE | Noted: 2018-05-21

## 2018-05-21 PROBLEM — D72.829 LEUKOCYTOSIS: Status: ACTIVE | Noted: 2018-05-21

## 2018-05-21 PROBLEM — E87.1 HYPONATREMIA: Status: ACTIVE | Noted: 2018-05-21

## 2018-05-21 PROBLEM — E87.2 LACTIC ACIDOSIS: Status: RESOLVED | Noted: 2018-05-21 | Resolved: 2018-05-21

## 2018-05-21 PROBLEM — E44.0 MODERATE PROTEIN-CALORIE MALNUTRITION (HCC): Status: ACTIVE | Noted: 2018-05-21

## 2018-05-21 PROCEDURE — G8978 MOBILITY CURRENT STATUS: HCPCS

## 2018-05-21 PROCEDURE — 94669 MECHANICAL CHEST WALL OSCILL: CPT

## 2018-05-21 PROCEDURE — G8979 MOBILITY GOAL STATUS: HCPCS

## 2018-05-21 PROCEDURE — 97163 PT EVAL HIGH COMPLEX 45 MIN: CPT

## 2018-05-21 PROCEDURE — 94760 N-INVAS EAR/PLS OXIMETRY 1: CPT

## 2018-05-21 PROCEDURE — 99232 SBSQ HOSP IP/OBS MODERATE 35: CPT | Performed by: INTERNAL MEDICINE

## 2018-05-21 PROCEDURE — 94660 CPAP INITIATION&MGMT: CPT

## 2018-05-21 PROCEDURE — 94664 DEMO&/EVAL PT USE INHALER: CPT

## 2018-05-21 PROCEDURE — 94640 AIRWAY INHALATION TREATMENT: CPT

## 2018-05-21 RX ORDER — FUROSEMIDE 20 MG/1
20 TABLET ORAL DAILY
Status: DISCONTINUED | OUTPATIENT
Start: 2018-05-21 | End: 2018-05-22 | Stop reason: HOSPADM

## 2018-05-21 RX ORDER — METHYLPREDNISOLONE SODIUM SUCCINATE 40 MG/ML
40 INJECTION, POWDER, LYOPHILIZED, FOR SOLUTION INTRAMUSCULAR; INTRAVENOUS EVERY 12 HOURS SCHEDULED
Status: DISCONTINUED | OUTPATIENT
Start: 2018-05-21 | End: 2018-05-22

## 2018-05-21 RX ORDER — AZITHROMYCIN 250 MG/1
250 TABLET, FILM COATED ORAL EVERY 24 HOURS
Status: DISCONTINUED | OUTPATIENT
Start: 2018-05-22 | End: 2018-05-22 | Stop reason: HOSPADM

## 2018-05-21 RX ORDER — AZITHROMYCIN 250 MG/1
500 TABLET, FILM COATED ORAL EVERY 24 HOURS
Status: COMPLETED | OUTPATIENT
Start: 2018-05-21 | End: 2018-05-21

## 2018-05-21 RX ADMIN — METHYLPREDNISOLONE SODIUM SUCCINATE 40 MG: 40 INJECTION, POWDER, FOR SOLUTION INTRAMUSCULAR; INTRAVENOUS at 20:59

## 2018-05-21 RX ADMIN — CARVEDILOL 12.5 MG: 12.5 TABLET, FILM COATED ORAL at 17:17

## 2018-05-21 RX ADMIN — IPRATROPIUM BROMIDE AND ALBUTEROL SULFATE 3 ML: .5; 3 SOLUTION RESPIRATORY (INHALATION) at 06:46

## 2018-05-21 RX ADMIN — IPRATROPIUM BROMIDE AND ALBUTEROL SULFATE 3 ML: .5; 3 SOLUTION RESPIRATORY (INHALATION) at 19:28

## 2018-05-21 RX ADMIN — PRAVASTATIN SODIUM 40 MG: 40 TABLET ORAL at 17:17

## 2018-05-21 RX ADMIN — ASPIRIN 81 MG: 81 TABLET, COATED ORAL at 09:48

## 2018-05-21 RX ADMIN — LISINOPRIL 5 MG: 5 TABLET ORAL at 09:50

## 2018-05-21 RX ADMIN — IPRATROPIUM BROMIDE AND ALBUTEROL SULFATE 3 ML: .5; 3 SOLUTION RESPIRATORY (INHALATION) at 14:30

## 2018-05-21 RX ADMIN — CARVEDILOL 12.5 MG: 12.5 TABLET, FILM COATED ORAL at 09:50

## 2018-05-21 RX ADMIN — FLUTICASONE PROPIONATE AND SALMETEROL 1 PUFF: 50; 250 POWDER RESPIRATORY (INHALATION) at 21:00

## 2018-05-21 RX ADMIN — IPRATROPIUM BROMIDE AND ALBUTEROL SULFATE 3 ML: .5; 3 SOLUTION RESPIRATORY (INHALATION) at 01:56

## 2018-05-21 RX ADMIN — FLUTICASONE PROPIONATE AND SALMETEROL 1 PUFF: 50; 250 POWDER RESPIRATORY (INHALATION) at 09:51

## 2018-05-21 RX ADMIN — IPRATROPIUM BROMIDE AND ALBUTEROL SULFATE 3 ML: .5; 3 SOLUTION RESPIRATORY (INHALATION) at 10:48

## 2018-05-21 RX ADMIN — METHYLPREDNISOLONE SODIUM SUCCINATE 60 MG: 125 INJECTION, POWDER, FOR SOLUTION INTRAMUSCULAR; INTRAVENOUS at 09:49

## 2018-05-21 RX ADMIN — ENOXAPARIN SODIUM 40 MG: 40 INJECTION SUBCUTANEOUS at 09:49

## 2018-05-21 RX ADMIN — FUROSEMIDE 20 MG: 20 TABLET ORAL at 09:50

## 2018-05-21 RX ADMIN — AZITHROMYCIN 500 MG: 250 TABLET, FILM COATED ORAL at 13:06

## 2018-05-21 NOTE — PLAN OF CARE
Problem: PHYSICAL THERAPY ADULT  Goal: Performs mobility at highest level of function for planned discharge setting  See evaluation for individualized goals  Treatment/Interventions: Functional transfer training, LE strengthening/ROM, Therapeutic exercise, Endurance training, Patient/family training, Equipment eval/education, Bed mobility, Gait training, Spoke to nursing  Equipment Recommended:  (@ this point, likely none)       See flowsheet documentation for full assessment, interventions and recommendations  Prognosis: Fair  Problem List: Decreased endurance, Impaired balance, Decreased mobility, Decreased coordination (gait deviations)  Assessment:  Pt is a 71 y o  male seen for PT evaluation s/p admit to [address] [place] [address] on 5/18/2018 w/ Acute on chronic respiratory failure with hypoxia and hypercapnia ([facility] [address] )  Order placed for PT  Prior to admission, pt was independent w/ all functional mobility w/ out device, lived in one floor environment and lived alone, just moved here from [facility]  Upon evaluation: Pt requires no physical assistance for bed mobility, transfers, and ambulation with out deivce, but limited to distances in room only  Pt's clinical presentation is currently unstable/unpredictable given the functional mobility deficits above, especially decreased endurance, impaired coordination, gait deviations, decreased functional mobility tolerance and SOB upon exertion, coupled with fall risks including impaired balance, and combined with medical complications of abnormal H&H, abnormal CO2 values and low SpO2 values  Pt to benefit from continued skilled PT tx while in hospital and upon DC to address deficits as defined above and maximize level of functional mobility   From PT/mobility standpoint, recommendation at time of d/c would be home with intermittent family support, and possible outpatient pumonary rehab as appropriate pending progress in order to maximize pt's functional independence and consistency w/ mobility in order to facilitate return to PLOF  Recommend ther ex next 1-2 sessions and trial with cane vs walker 1-2 sessions to improve amb distance/endurance  Barriers to Discharge: Decreased caregiver support     Recommendation:  (home to apt with OP pulm rehab (as approp) & intermittent A)          See flowsheet documentation for full assessment

## 2018-05-21 NOTE — PROGRESS NOTES
Progress Note - Pulmonary   Grayson Lewis 71 y o  male MRN: [id number]  Unit/Bed#: -01 Encounter: [id number]      Assessment/Plan:  1  Acute on chronic hypoxic & hypercapnic respiratory failure        *  Titrate supplemental oxygen back to baseline 2 5LNC  Keep saturations greater than or equal to 88%        *  Incentive spirometry Q1hr, OOB as able, increase activity as able        *  BiPAP QHS as he can tolerate  2  COPD of unknown severity with acute exacerbation        *  Solumedrol decreased yesterday  Will plan to decrease again in AM        *  Continue Advair and DuoNeb        * Start Azithromycin for 5 days due to persistent sputum color change and increasing procalcitonin   3  Ongoing tobacco abuse        *  Smoked last month due to increase stress & anxiety after his move here        *  Discussed smoking cessation --> NRT as needed    ~Outpatient pulmonary follow up as per discharge instructions  Will try and need to obtain records from pulmonologist in [facility] prior to office visit  ~Home regimen should include:  Advair 250/50 and DuoNeb 2-3 times daily    Subjective:   Mr Naomy Garcia is seen sitting out of bed in the chair  He is feeling better than on admission, but is not yet back to his pulmonary baseline  He was transferred out of the intensive care unit yesterday  He initially presented on May 18th with increasing shortness of breath and gray to brown sputum production  Chest x-ray was negative for any acute infiltrates  He was hypoxic in the 70s and required BiPAP upon presentation  He was  Able to be transitioned high-flow nasal cannula and then back down to regular nasal cannula  His BNP was elevated at almost 8000  Procalcitonin was also mildly elevated  He initially received azithromycin and ceftriaxone in the emergency room by further antibiotics were held  He was also given Solu-Medrol and nebulizers around the clock    It was felt that he had a component of chronic respiratory failure and he has been trialing BiPAP nightly but is having difficulty with this  He has been managed by a pulmonologist in [place] over the past 5 years  At baseline,  he is on oxygen at 2 5 L around the clock  He does not require chronic steroids and has only been hospitalized once for his breathing in the past 5 years  He has a heavy tobacco history and quit, but restarted last month due to increased stress and anxiety with the recent move here  He was smoking approximately 5 cigarettes a day and noticed a significant change in his breathing with doing so  He is maintained on Advair 250/50 and DuoNeb 2-3 times daily at home  He is hoping to establish his pulmonary care with us here at [facility]  He continues to have thick gray brown sputum  He has been afebrile  He denies chest pain, resting shortness of breath, fever or bronchospasm  Objective:     Vitals: Blood pressure 119/69, pulse 101, temperature 98 4 °F (36 9 °C), temperature source Oral, resp  rate 16, height 5' 10" (1 778 m), weight 65 kg (143 lb 4 8 oz), SpO2 94 %  , 3LNC, Body mass index is 20 56 kg/m²  Intake/Output Summary (Last 24 hours) at 05/21/18 1019  Last data filed at 05/21/18 0241   Gross per 24 hour   Intake              480 ml   Output              750 ml   Net             -270 ml         Physical Exam  Gen: Awake, alert, oriented x 3, no acute distress  HEENT: Mucous membranes moist, no oral lesions, no thrush, wearing O2 via NC  NECK: No accessory muscle use, JVP not elevated  Cardiac: Regular, single S1, single S2, tachycardic, no murmurs, no rubs, no gallops  Lungs: Very decreased breath sounds throughout bilaterally  No wheezes, rhonchi or rales noted  Abdomen: normoactive bowel sounds, soft nontender, nondistended, no rebound or rigidity, no guarding  Extremities: no cyanosis, no clubbing, no edema, wearing SCDs bilaterally    Labs: I have personally reviewed pertinent lab results  , ABG: No results found for: PHART, KVS1PTP, PO2ART, KPO3CUX, D1SPSETX, BEART, SOURCE, BNP: No results found for: BNP, CBC: No results found for: WBC, HGB, HCT, MCV, PLT, ADJUSTEDWBC, MCH, MCHC, RDW, MPV, NRBC, CMP: No results found for: NA, K, CL, CO2, ANIONGAP, BUN, CREATININE, GLUCOSE, CALCIUM, AST, ALT, ALKPHOS, PROT, ALBUMIN, BILITOT, EGFR, PT/INR: No results found for: PT, INR, Troponin: No results found for: TROPONINI     Procalcitonin 0 73,  48 hours prior it was 0 27    Preliminary sputum culture is showing rare epithelial cells, 1+ polys and rare Gram-positive rods    Imaging and other studies: I have personally reviewed pertinent films in PACS    PCXR 5/18/18  FINDINGS:     Cardiomediastinal silhouette appears unremarkable      Hyperinflated lung fields with flattening of the hemidiaphragms and blunting of the costophrenic angles  No pneumothorax  No effusions  No focal pneumonia  Mild interstitial thickening within the lung bases      Osseous structures appear within normal limits for patient age      IMPRESSION:     Mild interstitial thickening within the lung bases with blunting the costophrenic angles  Findings may represent small pleural effusions    No congestive failure or pneumonia      Hyperinflation suggesting underlying COPD        Shmuel Barragan PA-C

## 2018-05-21 NOTE — PROGRESS NOTES
[facility] Hospitalist Service - Internal Medicine Progress Note      PATIENT INFORMATION      Patient: Anyi Martinez 71 y o  male   MRN: [id number]  PCP: Wendie Marcial MD  Unit/Bed#: [facility] Encounter: [id number]  Date Of Visit: 05/21/18       ASSESSMENTS & PLAN        1  Acute on chronic respiratory failure with hypoxia and hypercapnia  · Down to baseline 2-3 L of oxygen via nasal cannula - status post transfer out of ICU yesterday (off BiPAP)  · Likely secondary to COPD exacerbation (see plan below) in the setting of chronic combined CHF     2  COPD exacerbation  · Appreciate pulmonology input - continue to wean IV Solu-Medrol (down to 40 mg BID today)  · Continue LABA/inhaled corticosteroid regimen (Advair) w/ DuoNeb treatments - on a Zithromax course  · Encourage incentive spirometry/respiratory protocol    3  CAD  · Continue ASA/Coreg/Zestril/statin - diet/lifestyle modifications  · Encourage smoking cessation    4  Tobacco abuse  · Strongly counseled cessation although likely unwilling to comply - offered nicotine patch if patient desires    5  Lactic acidosis on admission  · Lactic acid level normalized    6  Leukocytosis  · Normalized - likely secondary to COPD exacerbation coupled with steroids  · Remains afebrile     7  Chronic combined diastolic and systolic CHF  · EF of 88-17% with diffuse hypokinesis, grade-1 diastolic dysfunction, trace MR/TR, and mild pulmonary HTN  · Continue Coreg/Lasix/Zestril regimen  · CXR on admission negative for exacerbation per reading radiologist     8  Hyponatremia  · In the setting of chronic CHF - serum sodium normalized today - continue to monitor    9  Moderate protein calorie malnutrition  · BMI of 20 56 with evidence of muscle wasting/atrophy in the setting of chronic COPD/illness  · Initiate Ensure nutritional supplementation with meals      DVT Prophylaxis:  Lovenox      SUBJECTIVE     Seen/examined earlier today    Currently saturating on 3 L of oxygen via nasal cannula at rest sitting upright in a chair  He states shortness of breath has improved  Denies any fevers chills at this time  Had a few episodes of coughing overnight  Overall, he remains in positive spirits  OBJECTIVE     Vitals:   Temp (24hrs), Av 4 °F (36 9 °C), Min:98 °F (36 7 °C), Max:98 5 °F (36 9 °C)    HR:  [] 101  Resp:  [16-18] 16  BP: (119-158)/(63-82) 119/69  SpO2:  [91 %-99 %] 96 %  Body mass index is 20 56 kg/m²  Input and Output Summary (last 24 hours):        Intake/Output Summary (Last 24 hours) at 18 1229  Last data filed at 18 0900   Gross per 24 hour   Intake              720 ml   Output              750 ml   Net              -30 ml       Physical Exam:     GENERAL:  Cachectic - improved respiratory distress  HEAD:  Normocephalic - atraumatic - temporal wasting noted  EYES: PERRL - EOMI   MOUTH:  Mucosa moist  NECK:  Supple - full range of motion  CARDIAC:  Regular rate/rhythm - S1/S2 positive  PULMONARY:  Diminished bibasilar breath sounds with trace expiratory wheezes   ABDOMEN:  Soft - nontender/nondistended - active bowel sounds  MUSCULOSKELETAL:  Motor strength/range of motion mildly deconditioned  NEUROLOGIC:  Alert/oriented x 3  SKIN:  Chronic wrinkles/blemishes   PSYCHIATRIC:  Mood/affect pleasant      ADDITIONAL DATA     Labs & Recent Cultures:       Results from last 7 days  Lab Units 18  0513   WBC Thousand/uL 9 56   HEMOGLOBIN g/dL 11 6*   HEMATOCRIT % 35 6*   PLATELETS Thousands/uL 202   NEUTROS PCT % 81*   LYMPHS PCT % 6*   MONOS PCT % 13*   EOS PCT % 0       Results from last 7 days  Lab Units 18  0513  18  1219   SODIUM mmol/L 136  < > 131*   POTASSIUM mmol/L 4 1  < > 4 4   CHLORIDE mmol/L 98*  < > 92*   CO2 mmol/L 34*  < > 27   BUN mg/dL 28*  < > 23   CREATININE mg/dL 0 90  < > 0 98   CALCIUM mg/dL 8 6  < > 9 0   TOTAL PROTEIN g/dL  --   --  7 9   BILIRUBIN TOTAL mg/dL  --   --  2 10*   ALK PHOS U/L  -- --  89   ALT U/L  --   --  20   AST U/L  --   --  20   GLUCOSE RANDOM mg/dL 144*  < > 116   GLUCOSE, ISTAT   --   < >  --    < > = values in this interval not displayed  Results from last 7 days  Lab Units 05/18/18  1219   INR  1 24*           Results from last 7 days  Lab Units 05/19/18  1549 05/18/18 2018 05/18/18  1323 05/18/18  1220   BLOOD CULTURE   --   --  No Growth at 48 hrs  No Growth at 48 hrs  SPUTUM CULTURE  2+ Growth of Candida sp  presumptively albicans*  2+ Growth of   --   --   --    GRAM STAIN RESULT  Rare Epithelial Cells  1+ Polys  Rare Gram positive rods  --   --   --    INFLUENZA A PCR   --  None Detected  --   --    INFLUENZA B PCR   --  None Detected  --   --    RSV PCR   --  None Detected  --   --          Last 24 Hours Medication List:     Current Facility-Administered Medications:  aspirin 81 mg Oral Daily Alejandra Mead PA-C   [START ON 5/22/2018] azithromycin 250 mg Oral Q24H Daniel SensingMICHAEL   azithromycin 500 mg Oral Q24H Daniel SensingMICHAEL   carvedilol 12 5 mg Oral BID With Meals Alejandra Mead PA-C   enoxaparin 40 mg Subcutaneous Daily Alejandra Mead PA-C   fluticasone-salmeterol 1 puff Inhalation Q12H [address] Alejandra Mead PA-C   furosemide 20 mg Oral Daily Silviano Meier MD   ipratropium-albuterol 3 mL Nebulization Q6H Silviano Meier MD   ipratropium-albuterol 3 mL Nebulization Q4H PRN Silviano Meier MD   lisinopril 5 mg Oral Daily Alejandra Mead PA-C   methylPREDNISolone sodium succinate 40 mg Intravenous Q12H [address] Daniel SensingMICHAEL   pravastatin 40 mg Oral Daily With Braydon Moe PA-C          Time Spent for Care: 33 minutes  More than 50% of total time spent on counseling and coordination of care as described above        Current Length of Stay: 3 day(s)      Code Status: Level 1 - Full Code Carolin Sinks Carolin Sinks Carolin Sinks    ** Please Note: This note is constructed using a voice recognition dictation system   **

## 2018-05-21 NOTE — PHYSICAL THERAPY NOTE
PHYSICAL THERAPY EVALUATION  NAME:  Horace Stover  DATE: 05/21/18    AGE:   71 y o  Mrn:   [id number]  ADMIT DX:  Acute respiratory failure ([facility]) [J96 00]  Shortness of breath [R06 02]  COPD (chronic obstructive pulmonary disease) ([facility]) [J44 9]  Sepsis ([facility] [address] ) [A41 9]    Past Medical History:   Diagnosis Date    COPD [facility]chronic obstructive [facility] disease) ([email or web address] )     Heart attack ([email or web address] )      Length Of Stay: 3  Performed at least 2 patient identifiers during session: Name and Birthday    PHYSICAL THERAPY EVALUATION :    05/21/18 1813   Note Type   Note type Eval only   Pain Assessment   Pain Assessment No/denies pain   Home Living   Type of [address] One level  (no Teja)   Additional Comments 2 5 l /m at home      Prior Function   Level of [place] Independent with ADLs and functional mobility   Lives With Alone   Receives Help From Family  (son and daughter live closeby)   ADL Assistance Independent   IADLs Independent   Falls in the last 6 months 0   Vocational Retired  ([employer])   Restrictions/Precautions   Wells Angela Bearing Precautions Per Order No   General   Family/Caregiver Present No   Cognition   Overall Cognitive Status Impaired   Arousal/Participation Alert   Orientation Level Oriented X4   Memory Within functional limits   Following Commands Follows one step commands without difficulty   RUE Strength   RUE Overall Strength Within Functional Limits - able to perform ADL tasks with strength   LUE Strength   LUE Overall Strength Within Functional Limits - able to perform ADL tasks with strength   Strength RLE   R Hip Flexion 4/5   R Knee Extension 4+/5   R Ankle Dorsiflexion 4+/5   Strength LLE   LLE Overall Strength 4/5   L Knee Extension 4+/5   L Ankle Dorsiflexion 4+/5   Coordination   Movements are Fluid and Coordinated 1   Sensation + [place]; vision WFL   Light Touch   RLE Light Touch Grossly intact   LLE Light Touch Grossly intact   Bed Mobility   Supine to Sit 6  Modified independent   Additional items Assist x 1   Sit to Supine 6  Modified independent   Additional items Assist x 1   Ambulation/Elevation   Gait pattern Ataxia; Excessively slow   Gait Assistance 5  Supervision   Additional items Assist x 1  (for line management)   Assistive Device None   Distance 50'   Stair Management Assistance Not tested   Balance   Static Sitting Good   Static Standing Fair -   Ambulatory Poor +   Endurance Deficit   Endurance Deficit Yes   Endurance Deficit Description Before ambulation: Ventilatory response index level 2/4 on 3l/m, Spo2 94% and HR 98  Post ambulation VRI 4/4, Spo2 low of 88% and HR in 110's  Pt needed 4 min to recover to 94% with pursed lip breathing   Activity Tolerance   Activity Tolerance Patient limited by fatigue;Patient limited by pain   Nurse Made Aware spoke to [facility]   Assessment   Prognosis Fair   Problem List Decreased endurance; Impaired balance;Decreased mobility; Decreased coordination  (gait deviations)   Barriers to Discharge Decreased caregiver support   Goals   Patient Goals to go home, back to pulmonary rehab if indicated   STG Expiration Date 05/31/18   Short Term Goal #1 Pt will perform transfers modified I, amb w/ least restrictive device for > 150' w/o uncorrected losses of balance, perform TUG And achieve patient-class cut of score, consistent modified I for bed mobility   Treatment Day 0   Plan   Treatment/Interventions Functional transfer training;LE strengthening/ROM; Therapeutic exercise; Endurance training;Patient/family training;Equipment eval/education; Bed mobility;Gait training;Spoke to nursing   PT Frequency 5x/wk   Recommendation   Recommendation (home to apt with OP pulm rehab (as approp) & intermittent A)   Equipment Recommended (@ this point, likely none)   Additional Comments Recommend intermittent family assistance   Barthel Index   Feeding 10   Bathing 0   Grooming Score 5   Dressing Score 5   Bladder Score 10   Bowels Score 10   Toilet Use Score 10   Transfers (Bed/Chair) Score 10   Mobility (Level Surface) Score 0   Stairs Score 0   Barthel Index Score 60   (Please find full objective findings from PT assessment regarding body systems outlined above)  Assessment: Pt is a 71 y o  male seen for PT evaluation s/p admit to [facility] on 5/18/2018 w/ Acute on chronic respiratory failure with hypoxia and hypercapnia ([address] )  Order placed for PT  Prior to admission, pt was independent w/ all functional mobility w/ out device, lived in one floor environment and lived alone, just moved here from [address]  Upon evaluation: Pt requires no physical assistance for bed mobility, transfers, and ambulation with out deivce, but limited to distances in room only  Pt's clinical presentation is currently unstable/unpredictable given the functional mobility deficits above, especially decreased endurance, impaired coordination, gait deviations, decreased functional mobility tolerance and SOB upon exertion, coupled with fall risks including impaired balance, and combined with medical complications of abnormal H&H, abnormal CO2 values and low SpO2 values  Pt to benefit from continued skilled PT tx while in hospital and upon DC to address deficits as defined above and maximize level of functional mobility  From PT/mobility standpoint, recommendation at time of d/c would be home with intermittent family support, and possible outpatient pumonary rehab as appropriate pending progress in order to maximize pt's functional independence and consistency w/ mobility in order to facilitate return to PLOF  Recommend ther ex next 1-2 sessions and trial with cane vs walker 1-2 sessions to improve amb distance/endurance  The following objective measures were performed on IE: Barthel Index 60/100  Comorbidities affecting pt's physical performance at time of assessment include: limited hearing, COPD and heart attack   Personal factors affecting pt at time of IE include: limited home support, advanced age, inability to navigate community distances and recent move to [facility]    Xi Wakefield, PT, DPT

## 2018-05-22 VITALS
WEIGHT: 149.7 LBS | TEMPERATURE: 98.3 F | DIASTOLIC BLOOD PRESSURE: 77 MMHG | RESPIRATION RATE: 18 BRPM | BODY MASS INDEX: 21.43 KG/M2 | HEART RATE: 98 BPM | HEIGHT: 70 IN | OXYGEN SATURATION: 94 % | SYSTOLIC BLOOD PRESSURE: 137 MMHG

## 2018-05-22 PROBLEM — E87.1 HYPONATREMIA: Status: RESOLVED | Noted: 2018-05-21 | Resolved: 2018-05-22

## 2018-05-22 LAB
BACTERIA SPT RESP CULT: ABNORMAL
BACTERIA SPT RESP CULT: ABNORMAL
BASOPHILS # BLD MANUAL: 0 THOUSAND/UL (ref 0–0.1)
BASOPHILS NFR MAR MANUAL: 0 % (ref 0–1)
EOSINOPHIL # BLD MANUAL: 0 THOUSAND/UL (ref 0–0.4)
EOSINOPHIL NFR BLD MANUAL: 0 % (ref 0–6)
ERYTHROCYTE [DISTWIDTH] IN BLOOD BY AUTOMATED COUNT: 14.6 % (ref 11.6–15.1)
GRAM STN SPEC: ABNORMAL
HCT VFR BLD AUTO: 35.3 % (ref 36.5–49.3)
HGB BLD-MCNC: 11.4 G/DL (ref 12–17)
LYMPHOCYTES # BLD AUTO: 1.39 THOUSAND/UL (ref 0.6–4.47)
LYMPHOCYTES # BLD AUTO: 18 % (ref 14–44)
MCH RBC QN AUTO: 31.1 PG (ref 26.8–34.3)
MCHC RBC AUTO-ENTMCNC: 32.3 G/DL (ref 31.4–37.4)
MCV RBC AUTO: 96 FL (ref 82–98)
MONOCYTES # BLD AUTO: 1.16 THOUSAND/UL (ref 0–1.22)
MONOCYTES NFR BLD: 15 % (ref 4–12)
NEUTROPHILS # BLD MANUAL: 5.02 THOUSAND/UL (ref 1.85–7.62)
NEUTS BAND NFR BLD MANUAL: 3 % (ref 0–8)
NEUTS SEG NFR BLD AUTO: 62 % (ref 43–75)
PLATELET # BLD AUTO: 222 THOUSANDS/UL (ref 149–390)
PLATELET BLD QL SMEAR: ADEQUATE
PMV BLD AUTO: 10.8 FL (ref 8.9–12.7)
RBC # BLD AUTO: 3.66 MILLION/UL (ref 3.88–5.62)
TOTAL CELLS COUNTED SPEC: 100
VARIANT LYMPHS # BLD AUTO: 2 %
WBC # BLD AUTO: 7.72 THOUSAND/UL (ref 4.31–10.16)

## 2018-05-22 PROCEDURE — 94760 N-INVAS EAR/PLS OXIMETRY 1: CPT

## 2018-05-22 PROCEDURE — 94668 MNPJ CHEST WALL SBSQ: CPT

## 2018-05-22 PROCEDURE — 85027 COMPLETE CBC AUTOMATED: CPT | Performed by: INTERNAL MEDICINE

## 2018-05-22 PROCEDURE — 99232 SBSQ HOSP IP/OBS MODERATE 35: CPT | Performed by: INTERNAL MEDICINE

## 2018-05-22 PROCEDURE — 85007 BL SMEAR W/DIFF WBC COUNT: CPT | Performed by: INTERNAL MEDICINE

## 2018-05-22 PROCEDURE — 99239 HOSP IP/OBS DSCHRG MGMT >30: CPT | Performed by: INTERNAL MEDICINE

## 2018-05-22 PROCEDURE — 94660 CPAP INITIATION&MGMT: CPT

## 2018-05-22 PROCEDURE — 94640 AIRWAY INHALATION TREATMENT: CPT

## 2018-05-22 PROCEDURE — 94669 MECHANICAL CHEST WALL OSCILL: CPT

## 2018-05-22 RX ORDER — IPRATROPIUM BROMIDE AND ALBUTEROL SULFATE 2.5; .5 MG/3ML; MG/3ML
3 SOLUTION RESPIRATORY (INHALATION)
Qty: 180 ML | Refills: 0 | Status: SHIPPED | OUTPATIENT
Start: 2018-05-22 | End: 2018-06-07 | Stop reason: SDUPTHER

## 2018-05-22 RX ORDER — AZITHROMYCIN 250 MG/1
250 TABLET, FILM COATED ORAL EVERY 24 HOURS
Qty: 3 TABLET | Refills: 0 | Status: SHIPPED | OUTPATIENT
Start: 2018-05-23 | End: 2018-05-26

## 2018-05-22 RX ORDER — PREDNISONE 10 MG/1
40 TABLET ORAL DAILY
Qty: 30 TABLET | Refills: 0 | Status: SHIPPED | OUTPATIENT
Start: 2018-05-23 | End: 2018-06-28 | Stop reason: ALTCHOICE

## 2018-05-22 RX ORDER — PREDNISONE 20 MG/1
40 TABLET ORAL DAILY
Status: DISCONTINUED | OUTPATIENT
Start: 2018-05-23 | End: 2018-05-22 | Stop reason: HOSPADM

## 2018-05-22 RX ADMIN — IPRATROPIUM BROMIDE AND ALBUTEROL SULFATE 3 ML: .5; 3 SOLUTION RESPIRATORY (INHALATION) at 08:12

## 2018-05-22 RX ADMIN — ENOXAPARIN SODIUM 40 MG: 40 INJECTION SUBCUTANEOUS at 08:52

## 2018-05-22 RX ADMIN — CARVEDILOL 12.5 MG: 12.5 TABLET, FILM COATED ORAL at 08:51

## 2018-05-22 RX ADMIN — FLUTICASONE PROPIONATE AND SALMETEROL 1 PUFF: 50; 250 POWDER RESPIRATORY (INHALATION) at 08:52

## 2018-05-22 RX ADMIN — IPRATROPIUM BROMIDE AND ALBUTEROL SULFATE 3 ML: .5; 3 SOLUTION RESPIRATORY (INHALATION) at 11:04

## 2018-05-22 RX ADMIN — IPRATROPIUM BROMIDE AND ALBUTEROL SULFATE 3 ML: .5; 3 SOLUTION RESPIRATORY (INHALATION) at 00:01

## 2018-05-22 RX ADMIN — AZITHROMYCIN 250 MG: 250 TABLET, FILM COATED ORAL at 12:02

## 2018-05-22 RX ADMIN — LISINOPRIL 5 MG: 5 TABLET ORAL at 08:52

## 2018-05-22 RX ADMIN — FUROSEMIDE 20 MG: 20 TABLET ORAL at 08:52

## 2018-05-22 RX ADMIN — IPRATROPIUM BROMIDE AND ALBUTEROL SULFATE 3 ML: .5; 3 SOLUTION RESPIRATORY (INHALATION) at 14:23

## 2018-05-22 RX ADMIN — METHYLPREDNISOLONE SODIUM SUCCINATE 40 MG: 40 INJECTION, POWDER, FOR SOLUTION INTRAMUSCULAR; INTRAVENOUS at 08:51

## 2018-05-22 RX ADMIN — ASPIRIN 81 MG: 81 TABLET, COATED ORAL at 08:51

## 2018-05-22 NOTE — DISCHARGE SUMMARY
Discharge Summary - [facility] Internal Medicine    Patient Information: Kelley Florentino 71 y o  male MRN: [id number]  Unit/Bed#: -01 Encounter: [id number]    Discharging Physician / Practitioner: Gabriel Conti MD  PCP: Blaze Rivas MD  Admission Date: 5/18/2018  Discharge Date: 05/22/18    Reason for Admission:  Respiratory failure    Discharge Diagnoses:     Principal Problem:    Acute on chronic respiratory failure with hypoxia and hypercapnia ([facility] [address] )  Active Problems:    Coronary artery disease without angina pectoris    History of tobacco use    COPD with acute exacerbation ([facility] [address] )    Chronic combined systolic and diastolic congestive heart failure ([facility] [address] )    Leukocytosis    Moderate protein-calorie malnutrition ([facility] )    Consultations During Hospital Stay:  · Pulmonology    Procedures Performed:   · None    Significant findings:  · Chest x-ray 5/18/18 - Mild interstitial thickening within the lung bases with blunting the costophrenic angles  Findings may represent small pleural effusions  No congestive failure or pneumonia  Hyperinflation suggesting underlying COPD  · Echocardiogram 5/20/18 - EF 30-35%, akinesis of the anterior septal apical walls, grade 1 diastolic dysfunction    Hospital Course:   Kelley Florentino is a 71 y o  male patient who originally presented to the hospital on 5/18/2018 due to acute on chronic respiratory failure with hypoxia and hypercapnia  The patient has a known history of COPD and is on 2 5 L supplemental O2 chronically at home  Patient was initially admitted to the medical ICU secondary to hypoxia in the 70s requiring BiPAP and high-flow nasal cannular over the course of his hospital stay  BNP was also found to be elevated at 7700  His initial ABG revealed pH 7 2  Workup which included flu cultures, blood cultures were both negative  He was placed on IV steroids and started on a five-day course of azithromycin    Steroids were subsequently weaned down to oral prednisone which he will continue a tapering dose over the next several days  Patient continued to improve and was transferred out of the critical care unit on 5/20/18  He was cleared from the pulmonary standpoint by 5/22/18 for discharge home  Condition at Discharge: stable     Discharge Day Visit / Exam:     Subjective:  Patient reports feeling better  He notes shortness of breath is much improved although not quite at his baseline, but is eager for discharge home as he has not been getting enough sleep in the hospital   He is all dressed up and ready for discharge at this time  Vitals: Blood Pressure: 137/77 (05/22/18 0748)  Pulse: 98 (05/22/18 0748)  Temperature: 98 3 °F (36 8 °C) (05/22/18 0748)  Temp Source: Oral (05/22/18 0748)  Respirations: 18 (05/22/18 0748)  Height: 5' 10" (177 8 cm) (05/18/18 1608)  Weight - Scale: 67 9 kg (149 lb 11 2 oz) (05/22/18 0600)  SpO2: 92 % (05/22/18 1105)    General Appearance:    Alert, frail appearing, no distress, appropriately responsive   Head:    Normocephalic, without obvious abnormality, atraumatic, mucous membranes moist    Eyes:    Conjunctiva/corneas clear, EOM's intact   Neck:   Supple   Lungs:     Respirations nonlabored  Decreased breath sounds bilaterally, otherwise clear without crackles or wheeze     Heart:    Regular rate and rhythm, S1 and S2    Abdomen:     Soft, non-tender, bowel sounds active all four quadrants,     no masses, no organomegaly   Extremities:   Trace ankle edema bilaterally, otherwise clear   Neurologic:  nonfocal      Discharge instructions/Information to patient and family:   See after visit summary for information provided to patient and family  Provisions for Follow-Up Care:  See after visit summary for information related to follow-up care and any pertinent home health orders  Disposition: Home      Discharge Statement:  I spent >30 minutes discharging the patient  This time was spent on the day of discharge   I had direct contact with the patient on the day of discharge  Greater than 50% of the total time was spent examining patient, answering all patient questions, arranging and discussing plan of care with patient as well as directly providing post-discharge instructions  Additional time then spent on discharge activities  Discharge Medications:  See after visit summary for reconciled discharge medications provided to patient and family  ** Please Note: Dragon 360 Dictation voice to text software may have been used in the creation of this document   **

## 2018-05-22 NOTE — NURSING NOTE
Discharge instructions and new prescriptions reviewed with patient and patient stated understanding

## 2018-05-22 NOTE — PLAN OF CARE
DISCHARGE PLANNING     Discharge to home or other facility with appropriate resources Adequate for Discharge        INFECTION - ADULT     Absence or prevention of progression during hospitalization Adequate for Discharge        Knowledge Deficit     Patient/family/caregiver demonstrates understanding of disease process, treatment plan, medications, and discharge instructions Adequate for Discharge        PAIN - ADULT     Verbalizes/displays adequate comfort level or baseline comfort level Adequate for Discharge        Potential for Falls     Patient will remain free of falls Adequate for Discharge        RESPIRATORY - ADULT     Achieves optimal ventilation and oxygenation Adequate for Discharge        SAFETY ADULT     Patient will remain free of falls Adequate for Discharge     Maintain or return to baseline ADL function Adequate for Discharge     Maintain or return mobility status to optimal level Adequate for Discharge

## 2018-05-22 NOTE — DISCHARGE INSTRUCTIONS
Continue supplemental oxygen at 3 liters/minute    Discussed with your pulmonary physician when you can wean down to your baseline of 2 5 liters/minute

## 2018-05-22 NOTE — PROGRESS NOTES
Progress Note - Pulmonary   Kendra Kellogg 71 y o  male MRN: [id number]  Unit/Bed#: -01 Encounter: [id number]      Assessment/Plan:  1  Acute on chronic hypoxic & hypercapnic respiratory failure        *  Titrate supplemental oxygen back to baseline 2 5LNC  Keep saturations greater than or equal to 88%        *  Incentive spirometry Q1hr, OOB as able, increase activity as able        *  BiPAP QHS as he can tolerate  2  COPD of unknown severity with acute exacerbation        *  Transition to prednisone taper and plan to decrease by 10mg every 3 days        *  Continue Advair and DuoNeb        *  Day #2/5 Azithromycin   3  Ongoing tobacco abuse        *  Smoked last month due to increase stress & anxiety after his move here        *  Discussed smoking cessation --> NRT as needed    ~Outpatient pulmonary follow up as per discharge instructions  ~Discussed with Dr Shannan Vasquez --> hopeful D/C later today  ~Home regimen should be: Advair 250/50 and DuoNeb 2-3 times daily      Subjective:   Mr Citlali Florence is seen sitting on the edge of the bed  He is dressed and is asking me when he will be discharged  He is hopeful to be going home today  He is very anxious to be discharged  He is feeling much improved & remains on 3 L of oxygen  Typically he wears 2 5 L at home  He denies chest pain, resting shortness of breath, fever, or bronchospasm  Cough is improving  Objective:     Vitals: Blood pressure 137/77, pulse 98, temperature 98 3 °F (36 8 °C), temperature source Oral, resp  rate 18, height 5' 10" (1 778 m), weight 67 9 kg (149 lb 11 2 oz), SpO2 92 %  , 3LNC, Body mass index is 21 48 kg/m²        Intake/Output Summary (Last 24 hours) at 05/22/18 1109  Last data filed at 05/22/18 0748   Gross per 24 hour   Intake              240 ml   Output                0 ml   Net              240 ml         Physical Exam  Gen: Awake, alert, oriented x 3, no acute distress  HEENT: Mucous membranes moist, no oral lesions, no thrush, wearing 02 via NC  NECK: No accessory muscle use, JVP not elevated  Cardiac: Regular, single S1, single S2, tachycardic, no murmurs, no rubs, no gallops  Lungs: Decreased breath sounds throughout bilaterally without wheezes, rhonchi or rales noted  Abdomen: normoactive bowel sounds, soft nontender, nondistended, no rebound or rigidity, no guarding  Extremities: no cyanosis, no clubbing, no edema    Labs: I have personally reviewed pertinent lab results  , ABG: No results found for: PHART, MBR1DLC, PO2ART, GNY5AOT, L8ZULPCD, BEART, SOURCE, BNP: No results found for: BNP, CBC:   Lab Results   Component Value Date    WBC 7 72 05/22/2018    HGB 11 4 (L) 05/22/2018    HCT 35 3 (L) 05/22/2018    MCV 96 05/22/2018     05/22/2018    MCH 31 1 05/22/2018    MCHC 32 3 05/22/2018    RDW 14 6 05/22/2018    MPV 10 8 05/22/2018   , CMP: No results found for: NA, K, CL, CO2, ANIONGAP, BUN, CREATININE, GLUCOSE, CALCIUM, AST, ALT, ALKPHOS, PROT, ALBUMIN, BILITOT, EGFR, PT/INR: No results found for: PT, INR, Troponin: No results found for: TROPONINI     Imaging and other studies: I have personally reviewed pertinent films in PACS    No new pulmonary imaging since May 18, 2018    [place], [place]

## 2018-05-23 LAB
BACTERIA BLD CULT: NORMAL
BACTERIA BLD CULT: NORMAL

## 2018-06-04 ENCOUNTER — HOSPITAL ENCOUNTER (EMERGENCY)
Facility: HOSPITAL | Age: 73
Discharge: HOME | End: 2018-06-04
Attending: EMERGENCY MEDICINE
Payer: COMMERCIAL

## 2018-06-04 ENCOUNTER — TELEPHONE (OUTPATIENT)
Dept: UROLOGY | Facility: AMBULATORY SURGERY CENTER | Age: 73
End: 2018-06-04

## 2018-06-04 ENCOUNTER — TELEPHONE (OUTPATIENT)
Dept: INTERNAL MEDICINE CLINIC | Facility: CLINIC | Age: 73
End: 2018-06-04

## 2018-06-04 ENCOUNTER — TELEPHONE (OUTPATIENT)
Dept: PULMONOLOGY | Facility: CLINIC | Age: 73
End: 2018-06-04

## 2018-06-04 ENCOUNTER — APPOINTMENT (EMERGENCY)
Dept: RADIOLOGY | Facility: HOSPITAL | Age: 73
End: 2018-06-04
Payer: COMMERCIAL

## 2018-06-04 ENCOUNTER — OFFICE VISIT (OUTPATIENT)
Dept: INTERNAL MEDICINE CLINIC | Facility: CLINIC | Age: 73
End: 2018-06-04
Payer: COMMERCIAL

## 2018-06-04 VITALS
HEIGHT: 70 IN | OXYGEN SATURATION: 87 % | DIASTOLIC BLOOD PRESSURE: 70 MMHG | HEART RATE: 64 BPM | WEIGHT: 150.2 LBS | SYSTOLIC BLOOD PRESSURE: 102 MMHG | RESPIRATION RATE: 20 BRPM | BODY MASS INDEX: 21.5 KG/M2

## 2018-06-04 VITALS
OXYGEN SATURATION: 100 % | SYSTOLIC BLOOD PRESSURE: 123 MMHG | BODY MASS INDEX: 21.92 KG/M2 | HEART RATE: 82 BPM | WEIGHT: 152.78 LBS | TEMPERATURE: 97.6 F | RESPIRATION RATE: 22 BRPM | DIASTOLIC BLOOD PRESSURE: 85 MMHG

## 2018-06-04 DIAGNOSIS — Z87.891 HISTORY OF TOBACCO USE: ICD-10-CM

## 2018-06-04 DIAGNOSIS — J44.9 COPD (CHRONIC OBSTRUCTIVE PULMONARY DISEASE) (HCC): Primary | ICD-10-CM

## 2018-06-04 DIAGNOSIS — R06.01 ORTHOPNEA: ICD-10-CM

## 2018-06-04 DIAGNOSIS — I50.42 CHRONIC COMBINED SYSTOLIC AND DIASTOLIC CONGESTIVE HEART FAILURE (HCC): Chronic | ICD-10-CM

## 2018-06-04 DIAGNOSIS — R79.89 LOW VITAMIN D LEVEL: ICD-10-CM

## 2018-06-04 DIAGNOSIS — R35.0 FREQUENT URINATION: ICD-10-CM

## 2018-06-04 DIAGNOSIS — Z99.81 SUPPLEMENTAL OXYGEN DEPENDENT: ICD-10-CM

## 2018-06-04 DIAGNOSIS — J44.1 COPD WITH ACUTE EXACERBATION (HCC): Primary | ICD-10-CM

## 2018-06-04 PROBLEM — C43.61 MALIGNANT MELANOMA OF RIGHT UPPER EXTREMITY INCLUDING SHOULDER (HCC): Status: ACTIVE | Noted: 2018-06-04

## 2018-06-04 LAB
ATRIAL RATE: 91 BPM
P AXIS: 80 DEGREES
PR INTERVAL: 156 MS
QRS AXIS: 91 DEGREES
QRSD INTERVAL: 106 MS
QT INTERVAL: 344 MS
QTC INTERVAL: 389 MS
T WAVE AXIS: 254 DEGREES
VENTRICULAR RATE: 77 BPM

## 2018-06-04 PROCEDURE — 71046 X-RAY EXAM CHEST 2 VIEWS: CPT

## 2018-06-04 PROCEDURE — 99284 EMERGENCY DEPT VISIT MOD MDM: CPT

## 2018-06-04 PROCEDURE — 1111F DSCHRG MED/CURRENT MED MERGE: CPT | Performed by: INTERNAL MEDICINE

## 2018-06-04 PROCEDURE — 94640 AIRWAY INHALATION TREATMENT: CPT

## 2018-06-04 PROCEDURE — 93010 ELECTROCARDIOGRAM REPORT: CPT | Performed by: INTERNAL MEDICINE

## 2018-06-04 PROCEDURE — 93005 ELECTROCARDIOGRAM TRACING: CPT

## 2018-06-04 PROCEDURE — 99214 OFFICE O/P EST MOD 30 MIN: CPT | Performed by: INTERNAL MEDICINE

## 2018-06-04 RX ORDER — ERGOCALCIFEROL 1.25 MG/1
50000 CAPSULE ORAL WEEKLY
Qty: 1 CAPSULE | Refills: 0 | Status: CANCELLED | COMMUNITY
Start: 2018-06-04

## 2018-06-04 RX ORDER — IPRATROPIUM BROMIDE AND ALBUTEROL SULFATE 2.5; .5 MG/3ML; MG/3ML
3 SOLUTION RESPIRATORY (INHALATION) ONCE
Status: COMPLETED | OUTPATIENT
Start: 2018-06-04 | End: 2018-06-04

## 2018-06-04 RX ADMIN — IPRATROPIUM BROMIDE AND ALBUTEROL SULFATE 3 ML: .5; 3 SOLUTION RESPIRATORY (INHALATION) at 12:00

## 2018-06-04 NOTE — DISCHARGE INSTRUCTIONS
COPD (Chronic Obstructive Pulmonary Disease)   WHAT YOU NEED TO KNOW:   Chronic obstructive pulmonary disease (COPD) is a lung disease that makes it hard for you to breathe  It is usually a result of lung damage caused by years of irritation and inflammation in your lungs  DISCHARGE INSTRUCTIONS:   Call 911 if:   · You feel lightheaded, short of breath, and have chest pain  Return to the emergency department if:   · You are confused, dizzy, or feel faint  · Your arm or leg feels warm, tender, and painful  It may look swollen and red  · You cough up blood  Contact your healthcare provider if:   · You have more shortness of breath than usual      · You need more medicine than usual to control your symptoms  · You are coughing or wheezing more than usual      · You are coughing up more mucus, or it is a different color or has a different odor  · You gain more than 3 pounds in a week  · You have a fever, a runny or stuffy nose, and a sore throat, or other cold or flu symptoms  · Your skin, lips, or nails start to turn blue  · You have swelling in your legs or ankles  · You are very tired or weak for more than a day  · You notice changes in your mood, or changes in your ability to think or concentrate  · You have questions or concerns about your condition or care  Medicines:   · Medicines  may be used to open your airways, decrease swelling and inflammation in your lungs, or treat an infection  You may need 2 or more medicines  A short-acting medicine relieves symptoms quickly  Long-acting medicines will control or prevent symptoms  Ask for more information about the medicines you are given and how to use them safely  · Take your medicine as directed  Contact your healthcare provider if you think your medicine is not helping or if you have side effects  Tell him or her if you are allergic to any medicine  Keep a list of the medicines, vitamins, and herbs you take  Include the amounts, and when and why you take them  Bring the list or the pill bottles to follow-up visits  Carry your medicine list with you in case of an emergency  Help make breathing easier:   · Use pursed-lip breathing any time you feel short of breath  Take a deep breath in through your nose  Slowly breathe out through your mouth with your lips pursed for twice as long as you inhaled  You can also practice this breathing pattern while you bend, lift, climb stairs, or exercise  It slows down your breathing and helps move more air in and out of your lungs  · Do not smoke, and avoid others who smoke  Nicotine and other substances can cause lung irritation or damage and make it harder for you to breathe  Do not use e-cigarettes or smokeless tobacco  They still contain nicotine  Ask your healthcare provider for information if you currently smoke and need help to quit  For support and more information:  ¨ [employer]Smart  Phone: 2- 579 - 249-9562  Web Address: SQMOS      · Be aware of and avoid anything that makes your symptoms worse  Stay out of high altitudes and places with high humidity  Stay inside, or cover your mouth and nose with a scarf when you are outside during cold weather  Stay inside on days when air pollution or pollen counts are high  Do not use aerosol sprays such as deodorant, bug spray, and hair spray  Manage COPD and help prevent exacerbations:  COPD is a serious condition that gets worse over time  A COPD exacerbation means your symptoms suddenly get worse  It is important to prevent exacerbations  An exacerbation can cause more lung damage  COPD cannot be cured, but you can take action to feel better and prevent COPD exacerbations:  · Protect yourself from germs  Germs can get into your lungs and cause an infection  An infection in your lungs can create more mucus and make it harder to breathe   An infection can also create swelling in your airways and prevent air from getting in  You can decrease your risk for infection by doing the following:     [place] your hands often with soap and water  Carry germ-killing gel with you  You can use the gel to clean your hands when soap and water are not available  ¨ Do not touch your eyes, nose, or mouth unless you have washed your hands first      ¨ Always cover your mouth when you cough  Cough into a tissue or your shirtsleeve so you do not spread germs from your hands  ¨ Try to avoid people who have a cold or the flu  If you are sick, stay away from others as much as possible  · Drink more liquids  This will help to keep your air passages moist and help you cough up mucus  Ask how much liquid to drink each day and which liquids are best for you  · Exercise daily  Exercise for at least 20 minutes each day to help increase your energy and decrease shortness of breath  Walking or riding a bike are good ways to exercise  Talk to your healthcare provider about the best exercise plan for you  · Ask about vaccines  Your healthcare provider may recommend that you get regular flu and pneumonia vaccines  Pneumonia can become life-threatening for a person who has COPD  Ask about other vaccines you may need  Ask your healthcare provider about the flu and pneumonia vaccines  All adults should get the flu (influenza) vaccine every year as soon as it becomes available  The pneumonia vaccine is given to adults aged 72 or older to prevent pneumococcal disease, such as pneumonia  Adults aged 23 to 59 years who are at high risk for pneumococcal disease also should get the pneumococcal vaccine  It may need to be repeated 1 or 5 years later  Pulmonary rehabilitation:  Your healthcare provider may recommend a program to help you manage your symptoms and improve your quality of life  It may include nutritional counseling and exercise to strengthen your lungs     Make decisions about your choices for future treatment:  Ask for information about advanced medical directives and living griffin  These documents help you decide and write down your choices for treatment and end-of-life care  It is best to complete them when you feel well and can think clearly about your wishes  The information can then be kept for future use if you are in the hospital or become very ill  Follow up with your healthcare provider as directed: You may need more tests  Your healthcare provider may refer you to a pulmonary (lung) specialist  Write down your questions so you remember to ask them during your visits  © 2017 [facility] Information is for End User's use only and may not be sold, redistributed or otherwise used for commercial purposes  All illustrations and images included in CareNotes® are the copyrighted property of A D A M , Inc  or Rudi Golden  The above information is an  only  It is not intended as medical advice for individual conditions or treatments  Talk to your doctor, nurse or pharmacist before following any medical regimen to see if it is safe and effective for you

## 2018-06-04 NOTE — PROGRESS NOTES
Assessment/Plan:       Diagnoses and all orders for this visit:    COPD with acute exacerbation ([facility] [address] )  Comments:  flare of COPD vs CHF in setting of CAD/cardiomyopathy? - pt hypoxic and SOB -> taken to [facility] ER    Chronic combined systolic and diastolic congestive heart failure (HCC)  Comments:  with current exacerbation of sx(see above), ER eval now 2nd to SOB/hypoxia    History of tobacco use    Supplemental oxygen dependent  Comments:  uses 3 L/min continuously via NC    Orthopnea  Comments:  recently more SOB with lying on back    Frequent urination  Comments:  bladder infection vs hyperglycemia -> pt going to ER for 2nd as above    Low vitamin D level  Comments:  takes rx vitamin D2 once a week per his PCP in [place]    Other orders  -     Cancel: ergocalciferol (VITAMIN D2) 50,000 units; Take 1 capsule (50,000 Units total) by mouth once a week      I called patient access to notify of pt's immediate arrival in ER via wheelchair from my office(Mary Jo TONY MA assisted in getting pt to ER)    Subjective:      Patient ID: Charlotte Kussmaul is a 68 y o  male  HPI     New to practice here to establish care  Pt arrived to office 20 mins late but seen at scheduled time  Reports feeling SOB and is hypoxic at 83-87% on 3L continuous nasal cannula 02 with pursed lips breathing and bluish appearance to skin & some cough/congestion  Pt reports feeling more SOB when lying down, was recently in hospital last month for SOB/CHF/COPD exacerbation and treated with prednisone and z-pack  Moved from [facility] in April  Hx of COPD, former smoker, CHF/MI/stent, cardiomyopathy, on supplemental oxygen 3L continuously  Denies chest pain but c/o wheezing  Taking furosemide 1 tablet per day(discharged on BID dosing)  Has been having 'more salt' this past week and does not follow a fluid restricted diet  Still taking prednisone taper from hospital, on last 1-2 days of this rx    Also c/o frequent urination, "i'm going 30 times per day" but deneis dysuria or hematuria  No prior hx of bladder infection  Re-check of pulse of shows reading of 87% while seated in office on 3 L via N/C  Pt drove himself to appt today  No other complaints  Family History   Problem Relation Age of Onset    Heart disease Father     Heart disease Maternal Aunt     Heart disease Maternal Uncle     Cancer Neg Hx      Social History     Social History    Marital status: Single     Spouse name: N/A    Number of children: N/A    Years of education: N/A     Occupational History    Not on file  Social History Main Topics    Smoking status: Former Smoker     Packs/day: 1 00     Years: 52 00     Types: Cigarettes     Quit date: 7/1/2017    Smokeless tobacco: Never Used    Alcohol use 2 4 oz/week     4 Glasses of wine per week    Drug use: No    Sexual activity: Not on file     Other Topics Concern    Not on file     Social History Narrative    No narrative on file     Past Medical History:   Diagnosis Date    COPD [facility]chronic obstructive pulmonary disease) ([facility] [address] [facility] )     Heart attack ([facility] )      Vitals:    06/04/18 1014 06/04/18 1028   BP: 102/70    Pulse: 64    Resp: 20    SpO2: (!) 83% (!) 87%   Weight: 68 1 kg (150 lb 3 2 oz)    Height: 5' 10" (1 778 m)      Body mass index is 21 55 kg/m²      Current Outpatient Prescriptions:     albuterol (2 5 mg/3 mL) 0 083 % nebulizer solution, Take 2 5 mg by nebulization every 6 (six) hours as needed for wheezing, Disp: , Rfl:     aspirin (ECOTRIN LOW STRENGTH) 81 mg EC tablet, Take 81 mg by mouth daily, Disp: , Rfl:     carvedilol (COREG) 12 5 mg tablet, Take 12 5 mg by mouth 2 (two) times a day with meals, Disp: , Rfl:     fluticasone-salmeterol (ADVAIR) 250-50 mcg/dose inhaler, Inhale 1 puff every 12 (twelve) hours, Disp: 1 each, Rfl: 0    furosemide (LASIX) 20 mg tablet, Take 20 mg by mouth 2 (two) times a day, Disp: , Rfl:     ipratropium-albuterol (DUO-NEB) 0 5-2 5 mg/3 mL, Take 3 mL by nebulization every 6 (six) hours, Disp: 180 mL, Rfl: 0    lisinopril (ZESTRIL) 5 mg tablet, Take 5 mg by mouth daily, Disp: , Rfl:     predniSONE 10 mg tablet, Take 4 tablets (40 mg total) by mouth daily, Disp: 30 tablet, Rfl: 0    rosuvastatin (CRESTOR) 10 MG tablet, Take 10 mg by mouth daily, Disp: , Rfl:   No Known Allergies  Past Surgical History:   Procedure Laterality Date    CORONARY STENT PLACEMENT  2008    HERNIA REPAIR      right inguinal hernia    TONSILLECTOMY      WISDOM TOOTH EXTRACTION           Review of Systems   Constitutional: Negative for fever  HENT: Positive for congestion  Eyes: Negative for visual disturbance  Respiratory: Positive for cough, shortness of breath and wheezing  Cardiovascular: Negative for chest pain  Gastrointestinal: Negative for abdominal pain  Endocrine: Positive for polyuria  Genitourinary: Positive for frequency  Negative for dysuria  Musculoskeletal: Negative for arthralgias  Skin: Positive for pallor  Neurological: Negative for dizziness  Psychiatric/Behavioral: Negative for dysphoric mood  Objective:      /70   Pulse 64   Resp 20   Ht 5' 10" (1 778 m)   Wt 68 1 kg (150 lb 3 2 oz)   SpO2 (!) 87% Comment: on 3 L via NC  BMI 21 55 kg/m²          Physical Exam   Constitutional: He appears well-developed  He appears distressed (with pursed lips breathing, conversational dyspnea and unable to catch his breath)  HENT:   Head: Normocephalic and atraumatic  Mouth/Throat: Oropharynx is clear and moist    Eyes: Conjunctivae are normal  Pupils are equal, round, and reactive to light  Cardiovascular: Normal rate, regular rhythm and normal heart sounds  No murmur heard  Pulmonary/Chest: Accessory muscle usage present  Tachypnea noted  He has decreased breath sounds  He has no wheezes  He has no rales  Abdominal: Soft  Bowel sounds are normal  There is no tenderness  Musculoskeletal: He exhibits no edema  Lymphadenopathy:     He has no cervical adenopathy  Neurological: He is alert  Skin: There is pallor  Psychiatric: He has a normal mood and affect  His behavior is normal    Vitals reviewed

## 2018-06-04 NOTE — TELEPHONE ENCOUNTER
Complaint/Diagnosis - dysuria/frequency  [facility]urance[facility]   History of Cancer?yes          If yes, what kind?skin  Previous urologist? no              Records requested/where? Outside testing/where?

## 2018-06-04 NOTE — TELEPHONE ENCOUNTER
Pt returned to the office after the ER  Pt declined to schedule a follow up at this time   He is requesting a name and number for a urologist  I gave him the contact information for [facility]

## 2018-06-04 NOTE — ED PROVIDER NOTES
History  Chief Complaint   Patient presents with    Decreased Oxygen Level     pt states he was at routine pcp visit and SpO2 was 84-90% on 3L  pt states he normally wears 3L continuously at home  pt denies any c/p, SOB, or other complaints  SpO2 99% during triage     67 y/o male presents today from his PCP's with a low O2 sat  States his oxygen tank ran out and that's why its low  Feels better on O2 3L now which is his normal          History provided by:  Patient  Shortness of Breath   Severity:  Moderate  Onset quality:  Sudden  Timing:  Constant  Progression:  Improving  Chronicity:  New  Relieved by:  Oxygen  Associated symptoms: no abdominal pain, no chest pain, no cough, no diaphoresis, no fever, no headaches, no rash and no wheezing    Risk factors: tobacco use    Risk factors: no recent alcohol use and no hx of PE/DVT        Prior to Admission Medications   Prescriptions Last Dose Informant Patient Reported? Taking?    albuterol (2 5 mg/3 mL) 0 083 % nebulizer solution 6/4/2018 at Unknown time  Yes Yes   Sig: Take 2 5 mg by nebulization every 6 (six) hours as needed for wheezing   aspirin (ECOTRIN LOW STRENGTH) 81 mg EC tablet 6/3/2018 at Unknown time  Yes Yes   Sig: Take 81 mg by mouth daily   carvedilol (COREG) 12 5 mg tablet 6/3/2018 at Unknown time  Yes Yes   Sig: Take 12 5 mg by mouth 2 (two) times a day with meals   fluticasone-salmeterol (ADVAIR) 250-50 mcg/dose inhaler 6/3/2018 at Unknown time  No Yes   Sig: Inhale 1 puff every 12 (twelve) hours   furosemide (LASIX) 20 mg tablet 6/3/2018 at Unknown time  Yes Yes   Sig: Take 20 mg by mouth 2 (two) times a day   ipratropium-albuterol (DUO-NEB) 0 5-2 5 mg/3 mL 6/3/2018 at Unknown time  No Yes   Sig: Take 3 mL by nebulization every 6 (six) hours   lisinopril (ZESTRIL) 5 mg tablet 6/3/2018 at Unknown time  Yes Yes   Sig: Take 5 mg by mouth daily   predniSONE 10 mg tablet 6/3/2018 at Unknown time  No Yes   Sig: Take 4 tablets (40 mg total) by mouth daily   rosuvastatin (CRESTOR) 10 MG tablet 6/3/2018 at Unknown time  Yes Yes   Sig: Take 10 mg by mouth daily      Facility-Administered Medications: None       Past Medical History:   Diagnosis Date    CAD (coronary artery disease)     Cataracts, bilateral     [facility] [address] )     [facility]     Hyperlipidemia     Hypertension        Past Surgical History:   Procedure Laterality Date    COLONOSCOPY  2008    CORONARY STENT PLACEMENT  2008    HERNIA REPAIR      right inguinal hernia    TONSILLECTOMY      WISDOM TOOTH EXTRACTION         Family History   Problem Relation Age of Onset    Heart disease Father     Hypertension Father     Coronary artery disease Father     Hyperlipidemia Father     Heart disease Maternal Aunt     Heart disease Maternal Uncle     Cancer Neg Hx     Diabetes Neg Hx     Stroke Neg Hx     Arthritis Neg Hx     Sudden death Neg Hx      cardiac     I have reviewed and agree with the history as documented  Social History   Substance Use Topics    Smoking status: Former Smoker     Packs/day: 1 00     Years: 52 00     Types: Cigarettes     Quit date: 7/1/2017    Smokeless tobacco: Never Used    Alcohol use 2 4 oz/week     4 Glasses of wine per week      Comment: 2 glasses of wine per day for 50 years        Review of Systems   Constitutional: Negative for diaphoresis, fatigue and fever  HENT: Negative for congestion  Eyes: Negative for visual disturbance  Respiratory: Positive for shortness of breath  Negative for cough and wheezing  Cardiovascular: Negative for chest pain  Gastrointestinal: Negative for abdominal pain  Genitourinary: Negative for difficulty urinating  Musculoskeletal: Negative for back pain  Skin: Negative for pallor, rash and wound  Allergic/Immunologic: Negative for immunocompromised state  Neurological: Negative for dizziness and headaches     Psychiatric/Behavioral: Negative for confusion  Physical Exam  Physical Exam   Constitutional: He is oriented to person, place, and time  He appears well-developed and well-nourished  HENT:   Head: Normocephalic and atraumatic  Mouth/Throat: Uvula is midline, oropharynx is clear and moist and mucous membranes are normal  No tonsillar exudate  Eyes: Pupils are equal, round, and reactive to light  Neck: Normal range of motion  Neck supple  Cardiovascular: Normal rate and regular rhythm  Pulmonary/Chest:   Prolonged expiratory phase, decreased breath sounds diffusely  Pursed lip breathing  Patient does not report any respiratory distress at this time   Abdominal: Soft  Bowel sounds are normal  There is no tenderness  There is no rebound and no guarding  Musculoskeletal: Normal range of motion  Neurological: He is alert and oriented to person, place, and time  Patient moving all extremities equally, no focal neuro deficits noted  Skin: Skin is warm and dry  Psychiatric: He has a normal mood and affect  Nursing note and vitals reviewed  Vital Signs  ED Triage Vitals [06/04/18 1058]   Temperature Pulse Respirations Blood Pressure SpO2   97 6 °F (36 4 °C) 82 22 132/70 99 %      Temp Source Heart Rate Source Patient Position - Orthostatic VS BP Location FiO2 (%)   Oral Monitor -- -- --      Pain Score       No Pain           Vitals:    06/04/18 1058 06/04/18 1100 06/04/18 1130 06/04/18 1200   BP: 132/70 132/70 117/74 123/85   Pulse: 82 74 76 82       Visual Acuity      ED Medications  Medications   ipratropium-albuterol (DUO-NEB) 0 5-2 5 mg/3 mL inhalation solution 3 mL (3 mL Nebulization Given 6/4/18 1200)       Diagnostic Studies  Results Reviewed     None                 XR chest 2 views   Final Result by Greg Barajas DO (06/04 1240)   Stable appearance of the chest   No acute cardiopulmonary disease              Workstation performed: [id number]                    Procedures  Procedures       Phone Contacts  ED Phone Contact    ED Course                               MDM  Number of Diagnoses or Management Options  COPD (chronic obstructive pulmonary disease) ([place] [address] ): new and requires workup  Supplemental oxygen dependent:   Diagnosis management comments: 12:45 PM  Patient feels better after DuoNeb treatment  Chest x-ray shows chronic changes but nothing acute  O2 sat has been 100% while here on 3 L of oxygen which is his normal  Patient does have a replacement oxygen tank to replace the 1 that ran out while he was his PCPs office earlier  Patient is stable for discharge  Return to ED instructions reviewed  Amount and/or Complexity of Data Reviewed  Tests in the radiology section of CPT®: ordered and reviewed  Review and summarize past medical records: yes    Risk of Complications, Morbidity, and/or Mortality  Presenting problems: moderate  Diagnostic procedures: moderate  Management options: low    Patient Progress  Patient progress: improved    CritCare Time    Disposition  Final diagnoses:   COPD (chronic obstructive pulmonary disease) ([place] [address] )   Supplemental oxygen dependent     Time reflects when diagnosis was documented in both MDM as applicable and the Disposition within this note     Time User Action Codes Description Comment    6/4/2018 11:24 AM Jeb Minor Add [J44 9] COPD (chronic obstructive pulmonary disease) ([facility] )     6/4/2018 11:24 AM Jeb Minor Add [Z99 81] Supplemental oxygen dependent       ED Disposition     ED Disposition Condition Comment    Discharge  Cheri Newman discharge to home/self care      Condition at discharge: Stable        Follow-up Information     Follow up With Specialties Details Why Contact Info Additional [address], DO Internal Medicine Schedule an appointment as soon as possible for a visit  [address]  [address] [address] [facility] [address] [place] [address] Emergency Department Emergency Medicine  If symptoms worsen [address] [address] [facility] [address] [address]  [phone] AN ED, [address], [place], [address] [facility], [address]          Patient's Medications   Discharge Prescriptions    No medications on file     No discharge procedures on file      ED Provider  Electronically Signed by           Darcie Josue DO  06/04/18 1240

## 2018-06-05 DIAGNOSIS — J44.9 CHRONIC OBSTRUCTIVE PULMONARY DISEASE, UNSPECIFIED COPD TYPE (HCC): Primary | ICD-10-CM

## 2018-06-05 DIAGNOSIS — J96.11 CHRONIC RESPIRATORY FAILURE WITH HYPOXIA (HCC): ICD-10-CM

## 2018-06-05 NOTE — TELEPHONE ENCOUNTER
Patient scheduled for 06/14/18 at 1:45PM at [place] with Dr Felicitas Bravo  Short-packet intake paperwork mailed

## 2018-06-07 ENCOUNTER — OFFICE VISIT (OUTPATIENT)
Dept: PULMONOLOGY | Facility: CLINIC | Age: 73
End: 2018-06-07
Payer: COMMERCIAL

## 2018-06-07 ENCOUNTER — TELEPHONE (OUTPATIENT)
Dept: PULMONOLOGY | Facility: CLINIC | Age: 73
End: 2018-06-07

## 2018-06-07 ENCOUNTER — DOCUMENTATION (OUTPATIENT)
Dept: PULMONOLOGY | Facility: CLINIC | Age: 73
End: 2018-06-07

## 2018-06-07 VITALS
TEMPERATURE: 97.5 F | HEIGHT: 70 IN | OXYGEN SATURATION: 91 % | HEART RATE: 83 BPM | RESPIRATION RATE: 18 BRPM | SYSTOLIC BLOOD PRESSURE: 94 MMHG | DIASTOLIC BLOOD PRESSURE: 60 MMHG | WEIGHT: 148.4 LBS | BODY MASS INDEX: 21.24 KG/M2

## 2018-06-07 DIAGNOSIS — J96.11 CHRONIC RESPIRATORY FAILURE WITH HYPOXIA AND HYPERCAPNIA (HCC): ICD-10-CM

## 2018-06-07 DIAGNOSIS — J96.21 ACUTE ON CHRONIC RESPIRATORY FAILURE WITH HYPOXIA AND HYPERCAPNIA (HCC): ICD-10-CM

## 2018-06-07 DIAGNOSIS — J96.12 CHRONIC RESPIRATORY FAILURE WITH HYPOXIA AND HYPERCAPNIA (HCC): ICD-10-CM

## 2018-06-07 DIAGNOSIS — J96.22 ACUTE ON CHRONIC RESPIRATORY FAILURE WITH HYPOXIA AND HYPERCAPNIA (HCC): ICD-10-CM

## 2018-06-07 DIAGNOSIS — J44.1 COPD WITH ACUTE EXACERBATION (HCC): Primary | ICD-10-CM

## 2018-06-07 PROCEDURE — 99214 OFFICE O/P EST MOD 30 MIN: CPT | Performed by: NURSE PRACTITIONER

## 2018-06-07 RX ORDER — ALBUTEROL SULFATE 90 UG/1
2 AEROSOL, METERED RESPIRATORY (INHALATION) EVERY 4 HOURS PRN
Qty: 3 INHALER | Refills: 2 | Status: SHIPPED | OUTPATIENT
Start: 2018-06-07 | End: 2019-07-02 | Stop reason: SDUPTHER

## 2018-06-07 RX ORDER — IPRATROPIUM BROMIDE AND ALBUTEROL SULFATE 2.5; .5 MG/3ML; MG/3ML
3 SOLUTION RESPIRATORY (INHALATION)
Qty: 1080 ML | Refills: 2 | Status: SHIPPED | OUTPATIENT
Start: 2018-06-07 | End: 2018-09-05

## 2018-06-07 NOTE — ASSESSMENT & PLAN NOTE
Exacerbation is resolved and he completed prednisone taper today  He will continue with his Advair and DuoNeb nebulized with ProAir as needed  He is interested in pulmonary rehab to increase activity tolerance and orders were placed today  PFTs and CT scan from [place] were reviewed and in the note below  No repeat imaging at this time  In review of PFTs from August 2017, Camelia Mari has very severe COPD with an FEV1 of 19% predicted  It was reported that it had been unchanged since 2015

## 2018-06-07 NOTE — ASSESSMENT & PLAN NOTE
Continue 3 liters nasal cannula oxygen throughout the day    Per documentation, intolerant to BiPAP in the hospital

## 2018-06-07 NOTE — TELEPHONE ENCOUNTER
Reyes Lawton from [facility] requesting order form which was faxed over, for continuing oxygen on patient   Please advise

## 2018-06-07 NOTE — ASSESSMENT & PLAN NOTE
Smoked for a short period of time upon moving to [place], but reports he has not smoked since he left the hospital   Continued cessation

## 2018-06-07 NOTE — PROGRESS NOTES
Pulmonary Follow-Up Note   Kendra Kellogg 68 y o  male MRN: [id number]  6/7/2018      Assessment/Plan:    Problem List Items Addressed This Visit        Respiratory    Chronic respiratory failure with hypoxia and hypercapnia (HCC)     Continue 3 liters nasal cannula oxygen throughout the day  Per documentation, intolerant to BiPAP in the hospital          Relevant Medications    ipratropium-albuterol (DUO-NEB) 0 5-2 5 mg/3 mL nebulizer solution    fluticasone-salmeterol (ADVAIR) 250-50 mcg/dose inhaler    COPD with acute exacerbation (HCC) - Primary     Exacerbation is resolved and he completed prednisone taper today  He will continue with his Advair and DuoNeb nebulized with ProAir as needed  He is interested in pulmonary rehab to increase activity tolerance and orders were placed today  PFTs and CT scan from [place] were reviewed and in the note below  No repeat imaging at this time  In review of PFTs from August 2017, Margoth Morillo has very severe COPD with an FEV1 of 19% predicted  It was reported that it had been unchanged since 2015  Relevant Medications    ipratropium-albuterol (DUO-NEB) 0 5-2 5 mg/3 mL nebulizer solution    fluticasone-salmeterol (ADVAIR) 250-50 mcg/dose inhaler    albuterol (PROVENTIL HFA,VENTOLIN HFA) 90 mcg/act inhaler    Other Relevant Orders    Ambulatory Referral to Pulmonary Rehabilitation      Other Visit Diagnoses     Acute on chronic respiratory failure with hypoxia and hypercapnia (HCC)        Relevant Medications    ipratropium-albuterol (DUO-NEB) 0 5-2 5 mg/3 mL nebulizer solution    fluticasone-salmeterol (ADVAIR) 250-50 mcg/dose inhaler        Return in about 2 months (around 8/7/2018), or if symptoms worsen or fail to improve  All of Margoth Morillo' questions were answered prior to leaving the office today  He will follow-up with Dr Danilo Plasencia in three months or sooner should the need arise   He is aware to call our office with any further questions or concerns  History of Present Illness   Reason for Visit: Hospital Follow-up  Chief Complaint: "I am still SOB with any little activity "  HPI: Irish Pierce is a 68 y o  male who presents to the office today for follow-up after recent hospitalization for COPD exacerbation  Overall, Camelia Mair reports he is doing better; however, he is still extremely dyspneic with any minimal exertion  He lives at home alone and recently moved here from [place] to be closer to family  He denies any cough or sputum production above baseline, which is an occasional cough productive of white mucus  He denies any hemoptysis  He denies any chest pain or tightness  He denies any leg pain or swelling  He has not had any fevers, chills, or night sweats  He maintains on Advair and DuoNeb nebulized 4 times daily  He uses a ProAir rescue inhaler occasionally  In regard to dyspnea, Camelia Mari reports he is able to complete his activities of daily living, but must rest multiple times during completion of each activity  Prior to his hospitalization, he was able to take short walks outside of his home  Review of Systems   All other systems reviewed and are negative  A full 12-point review of systems was completed and is negative except for those outlined in the HPI      Historical Information   Past Medical History:   Diagnosis Date    CAD (coronary artery disease)     Cataracts, bilateral     [facility] [address] [facility]     [facility] attack [facility] [address] [facility]     Hyperlipidemia     Hypertension      Past Surgical History:   Procedure Laterality Date    COLONOSCOPY  2008    CORONARY STENT PLACEMENT  2008    HERNIA REPAIR      right inguinal hernia    TONSILLECTOMY      WISDOM TOOTH EXTRACTION       Family History   Problem Relation Age of Onset    Heart disease Father     Hypertension Father     Coronary artery disease Father     Hyperlipidemia Father     Heart disease Maternal Aunt     Heart disease Maternal Uncle     Cancer Neg Hx     Diabetes Neg Hx     Stroke Neg Hx     Arthritis Neg Hx     Sudden death Neg Hx      cardiac     Social History   History   Alcohol Use    2 4 oz/week    4 Glasses of wine per week     Comment: 2 glasses of wine per day for 50 years     History   Drug Use No     History   Smoking Status    Former Smoker    Packs/day: 1 00    Years: 52 00    Types: Cigarettes    Quit date: 7/1/2017   Smokeless Tobacco    Never Used     Meds/Allergies     Current Outpatient Prescriptions:     albuterol (2 5 mg/3 mL) 0 083 % nebulizer solution, Take 2 5 mg by nebulization every 6 (six) hours as needed for wheezing, Disp: , Rfl:     aspirin (ECOTRIN LOW STRENGTH) 81 mg EC tablet, Take 81 mg by mouth daily, Disp: , Rfl:     carvedilol (COREG) 12 5 mg tablet, Take 12 5 mg by mouth 2 (two) times a day with meals, Disp: , Rfl:     fluticasone-salmeterol (ADVAIR) 250-50 mcg/dose inhaler, Inhale 1 puff every 12 (twelve) hours for 90 days, Disp: 13 Inhaler, Rfl: 2    furosemide (LASIX) 20 mg tablet, Take 20 mg by mouth 2 (two) times a day, Disp: , Rfl:     ipratropium-albuterol (DUO-NEB) 0 5-2 5 mg/3 mL nebulizer solution, Take 1 vial (3 mL total) by nebulization every 6 (six) hours for 90 days, Disp: 1080 mL, Rfl: 2    lisinopril (ZESTRIL) 5 mg tablet, Take 5 mg by mouth daily, Disp: , Rfl:     rosuvastatin (CRESTOR) 10 MG tablet, Take 10 mg by mouth daily, Disp: , Rfl:     albuterol (PROVENTIL HFA,VENTOLIN HFA) 90 mcg/act inhaler, Inhale 2 puffs every 4 (four) hours as needed for wheezing or shortness of breath, Disp: 3 Inhaler, Rfl: 2    predniSONE 10 mg tablet, Take 4 tablets (40 mg total) by mouth daily, Disp: 30 tablet, Rfl: 0  No Known Allergies    Vitals: Blood pressure 94/60, pulse 83, temperature 97 5 °F (36 4 °C), temperature source Tympanic, resp  rate 18, height 5' 10" (1 778 m), weight 67 3 kg (148 lb 6 4 oz), SpO2 91 %  Body mass index is 21 29 kg/m²   Oxygen Therapy  SpO2: 91 % (3 liters O2)    Physical Exam:  Physical Exam   Constitutional: He is oriented to person, place, and time  He appears well-developed  He is cooperative  Non-toxic appearance  No distress  HENT:   Head: Normocephalic and atraumatic  Mouth/Throat: No oropharyngeal exudate  Eyes: EOM are normal  No scleral icterus  Neck: Neck supple  No JVD present  No tracheal deviation present  Cardiovascular: Normal rate, regular rhythm, S1 normal and S2 normal   Exam reveals no gallop and no friction rub  No murmur heard  Pulmonary/Chest: Effort normal  No accessory muscle usage or stridor  No tachypnea  No respiratory distress  He has decreased breath sounds  He has no wheezes  He has no rhonchi  He has no rales  He exhibits no tenderness  Abdominal: Soft  Bowel sounds are normal  He exhibits no distension  There is no tenderness  There is no rebound and no guarding  Musculoskeletal: He exhibits no edema or tenderness  Neurological: He is alert and oriented to person, place, and time  He has normal strength  GCS eye subscore is 4  GCS verbal subscore is 5  GCS motor subscore is 6  Skin: Skin is warm and dry  No abrasion, no ecchymosis, no lesion and no rash noted  He is not diaphoretic  No cyanosis or erythema  Nails show no clubbing  Psychiatric: He has a normal mood and affect  His speech is normal and behavior is normal    Vitals reviewed  Labs: I have personally reviewed pertinent lab results    Lab Results   Component Value Date    WBC 7 72 05/22/2018    HGB 11 4 (L) 05/22/2018    HCT 35 3 (L) 05/22/2018    MCV 96 05/22/2018     05/22/2018     Lab Results   Component Value Date    GLUCOSE 144 (H) 05/20/2018    CALCIUM 8 6 05/20/2018     05/20/2018    K 4 1 05/20/2018    CO2 34 (H) 05/20/2018    CL 98 (L) 05/20/2018    BUN 28 (H) 05/20/2018    CREATININE 0 90 05/20/2018     No results found for: IGE  Lab Results   Component Value Date    ALT 20 05/18/2018    AST 20 05/18/2018    ALKPHOS 89 05/18/2018    BILITOT 2 10 (H) 05/18/2018     Imaging and other studies: I have personally reviewed pertinent reports  and CT scan of the chest from January 2018 shows no suspicious nodules  Pulmonary Results (PFTs, PSG): I have personally reviewed pertinent reports  and Spirometry from August 2017:  FEV1/ FVC ratio is 69 with FEV1 of 0 66 and 19% predicted and FVC of 3 22 liters or 72% predicted  DANIEL Clarke  [facility]        Portions of the record may have been created with voice recognition software  Occasional wrong word or "sound a like" substitutions may have occurred due to the inherent limitations of voice recognition software  Read the chart carefully and recognize, using context, where substitutions have occurred or contact the dictating provider

## 2018-06-18 ENCOUNTER — OFFICE VISIT (OUTPATIENT)
Dept: UROLOGY | Facility: MEDICAL CENTER | Age: 73
End: 2018-06-18
Payer: COMMERCIAL

## 2018-06-18 VITALS
WEIGHT: 150 LBS | DIASTOLIC BLOOD PRESSURE: 78 MMHG | HEIGHT: 70 IN | BODY MASS INDEX: 21.47 KG/M2 | SYSTOLIC BLOOD PRESSURE: 94 MMHG

## 2018-06-18 DIAGNOSIS — N40.1 BPH WITH URINARY OBSTRUCTION: ICD-10-CM

## 2018-06-18 DIAGNOSIS — R35.0 FREQUENCY OF URINATION: Primary | ICD-10-CM

## 2018-06-18 DIAGNOSIS — N13.8 BPH WITH URINARY OBSTRUCTION: ICD-10-CM

## 2018-06-18 DIAGNOSIS — N39.41 URGE INCONTINENCE: ICD-10-CM

## 2018-06-18 LAB

## 2018-06-18 PROCEDURE — 51798 US URINE CAPACITY MEASURE: CPT | Performed by: UROLOGY

## 2018-06-18 PROCEDURE — 81003 URINALYSIS AUTO W/O SCOPE: CPT | Performed by: UROLOGY

## 2018-06-18 PROCEDURE — 99205 OFFICE O/P NEW HI 60 MIN: CPT | Performed by: UROLOGY

## 2018-06-18 RX ORDER — ALFUZOSIN HYDROCHLORIDE 10 MG/1
TABLET, EXTENDED RELEASE ORAL
Qty: 90 TABLET | Refills: 0 | Status: SHIPPED | OUTPATIENT
Start: 2018-06-18 | End: 2018-09-10 | Stop reason: ALTCHOICE

## 2018-06-18 NOTE — LETTER
June 18, 2018     [facility] [address] [facility]  [place] [address]    Patient: Roc High   YOB: 1945   Date of Visit: 6/18/2018       Dear Dr Jayda Aj:    Thank you for referring Roc High to me for evaluation  Below are my notes for this consultation  If you have questions, please do not hesitate to call me  I look forward to following your patient along with you           Sincerely,        Alissa Erickson MD        CC: No Recipients

## 2018-06-18 NOTE — PROGRESS NOTES
Assessment/Plan:  1  BPH and significant overactive bladder with urgency incontinence  2   Start Uroxatral all, at some point may need to add an overactive bladder medicine to    3  Follow-up one month, cysto at that time to further evaluate, will need PSA also  No problem-specific Assessment & Plan notes found for this encounter  Diagnoses and all orders for this visit:    Frequency of urination  -     POCT Measure PVR  -     POCT urine dip auto non-scope          Subjective:      Patient ID: Esther Matamoros is a 68 y o  male  Many years of BPH with slower flow, hesitancy, poor emptying  The past 6-12 months much worse urgency and urge incontinence, voids 25 times per day, 6-8 times at night  No help Avodart years ago, no help Flomax the past six months  Denies hematuria, infections, stones  The following portions of the patient's history were reviewed and updated as appropriate: allergies, current medications, past family history, past medical history, past social history, past surgical history and problem list     Review of Systems   Respiratory:        COPD   All other systems reviewed and are negative  Objective:      BP 94/78   Ht 5' 10" (1 778 m)   Wt 68 kg (150 lb)   BMI 21 52 kg/m²          Physical Exam   Constitutional: He is oriented to person, place, and time  He appears well-developed and well-nourished  No distress  HENT:   Head: Normocephalic and atraumatic  Eyes: Conjunctivae are normal    Cardiovascular: Normal rate and regular rhythm  Pulmonary/Chest: He is in respiratory distress  He has wheezes  Short of breath, pursed  breathing   Abdominal: Soft  Bowel sounds are normal  He exhibits no distension and no mass  There is no tenderness     Genitourinary:   Genitourinary Comments: Penis testes no lesions    Prostate moderately enlarged slightly irregular but no nodules        Urinalysis clear   Neurological: He is alert and oriented to person, place, and time  Skin: Skin is warm and dry  He is not diaphoretic

## 2018-06-19 ENCOUNTER — TELEPHONE (OUTPATIENT)
Dept: PULMONOLOGY | Facility: CLINIC | Age: 73
End: 2018-06-19

## 2018-06-19 ENCOUNTER — TELEPHONE (OUTPATIENT)
Dept: CARDIOLOGY CLINIC | Facility: CLINIC | Age: 73
End: 2018-06-19

## 2018-06-19 NOTE — TELEPHONE ENCOUNTER
Spoke with pt, informed that he needs to come in only for six minute walk so we can send info to [address] [place] [address] for him to get his oxygen   Pt understood and schedule office visit for 6/20/2010 @10:30 am

## 2018-06-19 NOTE — TELEPHONE ENCOUNTER
Pt called on 6/18/2018 @ 415pm stating that he is almost low on oxygen and is requesting to have more ordered  Explained to pt that he will have to make an appointment first to come in to do a six minute walk  Pt states that is unable to do the walk  Pt states that NATE Packer would know of his information  Stated to pt will talk with Landon and contact him back  6/19/2018: Spoke with Landon about pt, Landon is in agreement stating pt needs to come in for six minute walk for his oxygen to be approved  LMOVM for pt to call back to inform of this

## 2018-06-20 ENCOUNTER — OFFICE VISIT (OUTPATIENT)
Dept: PULMONOLOGY | Facility: CLINIC | Age: 73
End: 2018-06-20

## 2018-06-20 ENCOUNTER — TELEPHONE (OUTPATIENT)
Dept: PULMONOLOGY | Facility: CLINIC | Age: 73
End: 2018-06-20

## 2018-06-20 DIAGNOSIS — J96.12 CHRONIC RESPIRATORY FAILURE WITH HYPOXIA AND HYPERCAPNIA (HCC): Primary | ICD-10-CM

## 2018-06-20 DIAGNOSIS — J96.11 CHRONIC RESPIRATORY FAILURE WITH HYPOXIA AND HYPERCAPNIA (HCC): Primary | ICD-10-CM

## 2018-06-20 PROCEDURE — RECHECK: Performed by: INTERNAL MEDICINE

## 2018-06-28 ENCOUNTER — OFFICE VISIT (OUTPATIENT)
Dept: CARDIOLOGY CLINIC | Facility: CLINIC | Age: 73
End: 2018-06-28
Payer: COMMERCIAL

## 2018-06-28 VITALS
WEIGHT: 146.9 LBS | HEART RATE: 76 BPM | BODY MASS INDEX: 21.03 KG/M2 | DIASTOLIC BLOOD PRESSURE: 60 MMHG | SYSTOLIC BLOOD PRESSURE: 98 MMHG | HEIGHT: 70 IN

## 2018-06-28 DIAGNOSIS — J44.1 COPD WITH ACUTE EXACERBATION (HCC): Primary | ICD-10-CM

## 2018-06-28 DIAGNOSIS — I50.42 CHRONIC COMBINED SYSTOLIC AND DIASTOLIC CONGESTIVE HEART FAILURE (HCC): Chronic | ICD-10-CM

## 2018-06-28 DIAGNOSIS — I25.10 CORONARY ARTERY DISEASE INVOLVING NATIVE CORONARY ARTERY OF NATIVE HEART WITHOUT ANGINA PECTORIS: Chronic | ICD-10-CM

## 2018-06-28 PROCEDURE — 99204 OFFICE O/P NEW MOD 45 MIN: CPT | Performed by: INTERNAL MEDICINE

## 2018-06-28 RX ORDER — ROSUVASTATIN CALCIUM 40 MG/1
1 TABLET, COATED ORAL DAILY
COMMUNITY
Start: 2018-06-19 | End: 2019-09-23 | Stop reason: SDUPTHER

## 2018-06-28 NOTE — PATIENT INSTRUCTIONS
Recommendations:  1  Discontinue furosemide  2  Discontinue lisinopril  3  Start Entresto 24/26mg 2x/day in 2 days  4  Follow up in 3 months

## 2018-06-28 NOTE — PROGRESS NOTES
Cardiology   Charlotte Kussmaul 68 y o  male MRN: [id number]        Impression:  1  Ischemic cardiomyopathy - improvement in LV systolic function over past year  2  COPD - on chronic O2   3  Dyslipidemia - on statin  Recommendations:  1  Discontinue furosemide  2  Discontinue lisinopril  3  Start Entresto 24/26mg 2x/day in 2 days  4  Follow up in 3 months  HPI: Charlotte Kussmaul is a 68y o  year old male with a history of ischemic cardiomyopathy s/p multiple PCI, COPD, who presents for establishment of a new cardiologist   Recently moved from [place]  Does wear chronic oxygen of 3L, and has dyspnea on exertion  Recently in hospital with COPD exacerbation  An echocardiogram 5/18 demonstrated EF 30-35%, which is improved from 15% in 2017  No chest pain or palpitations  Review of Systems   Constitutional: Negative  HENT: Negative  Eyes: Negative  Respiratory: Positive for shortness of breath  Negative for chest tightness  Cardiovascular: Negative for chest pain, palpitations and leg swelling  Gastrointestinal: Negative  Endocrine: Negative  Genitourinary: Negative  Musculoskeletal: Negative  Skin: Negative  Allergic/Immunologic: Negative  Neurological: Negative  Hematological: Negative  Psychiatric/Behavioral: Negative  All other systems reviewed and are negative          Past Medical History:   Diagnosis Date    CAD (coronary artery disease)     Cataracts, bilateral     [facility] [address] [facility]     Heart attack ([facility] [address] [facility]     Hyperlipidemia     Hypertension      Past Surgical History:   Procedure Laterality Date    COLONOSCOPY  2008    CORONARY STENT PLACEMENT  2008    HERNIA REPAIR      right inguinal hernia    TONSILLECTOMY      WISDOM TOOTH EXTRACTION       History   Alcohol Use    1 2 oz/week    2 Glasses of wine per week     Comment: 2 glasses of wine per day for 50 years     History   Drug Use No     History Smoking Status    Former Smoker    Packs/day: 1 00    Years: 52 00    Types: Cigarettes    Quit date: 7/1/2017   Smokeless Tobacco    Never Used     Family History   Problem Relation Age of Onset    Heart disease Father     Hypertension Father     Coronary artery disease Father     Hyperlipidemia Father     Heart disease Maternal Aunt     Heart disease Maternal Uncle     Cancer Neg Hx     Diabetes Neg Hx     Stroke Neg Hx     Arthritis Neg Hx     Sudden death Neg Hx         cardiac       Allergies:  No Known Allergies    Medications:     Current Outpatient Prescriptions:     albuterol (2 5 mg/3 mL) 0 083 % nebulizer solution, Take 2 5 mg by nebulization every 6 (six) hours as needed for wheezing, Disp: , Rfl:     albuterol (PROVENTIL HFA,VENTOLIN HFA) 90 mcg/act inhaler, Inhale 2 puffs every 4 (four) hours as needed for wheezing or shortness of breath, Disp: 3 Inhaler, Rfl: 2    alfuzosin (UROXATRAL) 10 mg 24 hr tablet, One tablet daily, right after supper, Disp: 90 tablet, Rfl: 0    aspirin (ECOTRIN LOW STRENGTH) 81 mg EC tablet, Take 81 mg by mouth daily, Disp: , Rfl:     carvedilol (COREG) 12 5 mg tablet, Take 12 5 mg by mouth 2 (two) times a day with meals, Disp: , Rfl:     fluticasone-salmeterol (ADVAIR) 250-50 mcg/dose inhaler, Inhale 1 puff every 12 (twelve) hours for 90 days, Disp: 13 Inhaler, Rfl: 2    furosemide (LASIX) 20 mg tablet, Take 20 mg by mouth daily , Disp: , Rfl:     ipratropium-albuterol (DUO-NEB) 0 5-2 5 mg/3 mL nebulizer solution, Take 1 vial (3 mL total) by nebulization every 6 (six) hours for 90 days, Disp: 1080 mL, Rfl: 2    lisinopril (ZESTRIL) 5 mg tablet, Take 5 mg by mouth daily, Disp: , Rfl:     rosuvastatin (CRESTOR) 40 MG tablet, Take 1 tablet by mouth daily, Disp: , Rfl:       Wt Readings from Last 3 Encounters:   06/28/18 66 6 kg (146 lb 14 4 oz)   06/18/18 68 kg (150 lb)   06/07/18 67 3 kg (148 lb 6 4 oz)     Temp Readings from Last 3 Encounters: 18 97 5 °F (36 4 °C) (Tympanic)   18 97 6 °F (36 4 °C) (Oral)   18 98 3 °F (36 8 °C) (Oral)     BP Readings from Last 3 Encounters:   18 98/60   18 94/78   18 94/60     Pulse Readings from Last 3 Encounters:   18 76   18 83   18 82         Physical Exam   Constitutional: He is oriented to person, place, and time  He appears well-developed  HENT:   Head: Normocephalic  Eyes: EOM are normal    Neck: Normal range of motion  Cardiovascular: Normal rate, regular rhythm and normal heart sounds  Exam reveals no gallop and no friction rub  No murmur heard  Pulmonary/Chest: Effort normal and breath sounds normal  No respiratory distress  He has no wheezes  He has no rales  Abdominal: Soft  Musculoskeletal: Normal range of motion  Neurological: He is alert and oriented to person, place, and time  Skin: Skin is warm and dry  Psychiatric: He has a normal mood and affect  Laboratory Studies:  CMP:  Lab Results   Component Value Date     2018    K 4 1 2018    CL 98 (L) 2018    CO2 34 (H) 2018    ANIONGAP 4 2018    BUN 28 (H) 2018    CREATININE 0 90 2018    GLUCOSE 144 (H) 2018    AST 20 2018    ALT 20 2018    BILITOT 2 10 (H) 2018    EGFR 86 2018       Cardiac testing:     Results for orders placed during the hospital encounter of 18   Echo complete with contrast if indicated    Narrative [facility] [facility]  [phone]    Transthoracic Echocardiogram  2D, M-mode, Doppler, and Color Doppler    Study date:  20-May-2018    Patient: Meghna Rapp  MR number: [id number]  Account number: [de-identified]  : 1945  Age: 71 years  Gender: Male  Status: Inpatient  Location: Bedside  Height: 70 in  Weight: 142 8 lb  BP: 144/ 78 mmHg    Indications: Shortness of breath      Diagnoses: R06 02 - Shortness of breath    Sonographer:  CARA Bates  Referring Physician:  Mahnaz Torres DO  Group:  [id number]  Interpreting Physician:  Med Hamm MD    SUMMARY    LEFT VENTRICLE:  The ventricle was mildly dilated  Systolic function was moderately reduced by visual assessment  Ejection fraction was estimated in the range of 30 % to 35 %  There was mild diffuse hypokinesis  There was akinesis of the mid anteroseptal, apical septal, and apical wall(s)  Doppler parameters were consistent with abnormal left ventricular relaxation (grade 1 diastolic dysfunction)  LEFT ATRIUM:  The atrium was mildly dilated  RIGHT ATRIUM:  The atrium was mildly dilated  MITRAL VALVE:  There was trace regurgitation  TRICUSPID VALVE:  There was trace regurgitation  IVC, HEPATIC VEINS:  The inferior vena cava was mildly dilated  Respirophasic changes were blunted (less than 50% variation)  HISTORY: PRIOR HISTORY: Shortness of breath, Sepsis, Remote MI, COPD    PROCEDURE: The procedure was performed at the bedside  This was a routine study  The transthoracic approach was used  The study included complete 2D imaging, M-mode, complete spectral Doppler, and color Doppler  The heart rate was 48 bpm,  at the start of the study  Images were obtained from the parasternal, apical, subcostal, and suprasternal notch acoustic windows  Image quality was adequate  LEFT VENTRICLE: The ventricle was mildly dilated  Systolic function was moderately reduced by visual assessment  Ejection fraction was estimated in the range of 30 % to 35 %  There was mild diffuse hypokinesis  There was akinesis of the  mid anteroseptal, apical septal, and apical wall(s)  DOPPLER: Doppler parameters were consistent with abnormal left ventricular relaxation (grade 1 diastolic dysfunction)      RIGHT VENTRICLE: The size was normal  Systolic function was normal  Wall thickness was normal     LEFT ATRIUM: The atrium was mildly dilated  RIGHT ATRIUM: The atrium was mildly dilated  MITRAL VALVE: There was mild thickening  Not well visualized  DOPPLER: There was trace regurgitation  AORTIC VALVE: The valve was not well visualized  DOPPLER: Transaortic velocity was within the normal range  There was no significant regurgitation  TRICUSPID VALVE: Not well visualized  DOPPLER: There was trace regurgitation  Pulmonary artery systolic pressure was mildly increased  PULMONIC VALVE: Not well visualized  DOPPLER: The transpulmonic velocity was within the normal range  There was no regurgitation  PERICARDIUM: There was no pericardial effusion  The pericardium was normal in appearance  AORTA: The root exhibited normal size  SYSTEMIC VEINS: IVC: The inferior vena cava was mildly dilated  Respirophasic changes were blunted (less than 50% variation)  SYSTEM MEASUREMENT TABLES    2D  %FS: 18 98 %  AV Diam: 3 78 cm  EDV(Teich): 226 55 ml  EF(Teich): 38 2 %  ESV(Teich): 140 01 ml  IVSd: 0 89 cm  LA Area: 21 73 cm2  LA Diam: 4 25 cm  LVEDV MOD A4C: 148 9 ml  LVEF MOD A4C: 36 68 %  LVESV MOD A4C: 94 29 ml  LVIDd: 6 64 cm  LVIDs: 5 38 cm  LVLd A4C: 9 49 cm  LVLs A4C: 9 17 cm  LVPWd: 0 94 cm  RA Area: 19 68 cm2  RVIDd: 3 89 cm  SV MOD A4C: 54 61 ml  SV(Teich): 86 54 ml    CW  TR MaxP 73 mmHg  TR Vmax: 2 73 m/s    MM  TAPSE: 2 cm    PW  AVC: 342 79 ms  E': 0 04 m/s  E/E': 14 25  MV A Bogdan: 0 78 m/s  MV Dec Rogers: 5 56 m/s2  MV DecT: 114 93 ms  MV E Bogdan: 0 64 m/s  MV E/A Ratio: 0 82  MV PHT: 33 33 ms  MVA By PHT: 6 6 cm2    [facility] Accredited Echocardiography Laboratory    Prepared and electronically signed by    Sohan Shane MD  Signed [id number] 13:40:32     sit

## 2018-07-02 ENCOUNTER — CLINICAL SUPPORT (OUTPATIENT)
Dept: PULMONOLOGY | Facility: CLINIC | Age: 73
End: 2018-07-02
Payer: COMMERCIAL

## 2018-07-02 DIAGNOSIS — J44.1 COPD WITH ACUTE EXACERBATION (HCC): ICD-10-CM

## 2018-07-03 VITALS — WEIGHT: 147 LBS | BODY MASS INDEX: 21.05 KG/M2 | HEIGHT: 70 IN

## 2018-07-03 NOTE — PROGRESS NOTES
Dave Nestor   1945     Risk:      Pre Post % Change Goal   Date:       Physical       Sub Max ETT (mets)    10% increase   6MWT (feet) 360 ft Total Distance (feet): 360 feet  10% increase   UCSD Dyspnea Score 55 UCSD SOB Total Score: 55  5 pt decrease   Supplemental O2 use (L) 3 lpm       [facility] (est peak O2)        Peak exercise CR/CT (mets)    40% increase   Emotional       PHQ9 (> 10 refer to MD) 4 PHQ-9 Total Score: 4   4 4 pt decrease   [place] (lower score = improvement)       Total 24 Total: 24  < 27   Feelings 1 Feelings: Not at all  < 3   Physical Fitness 5 Physical Fitness: Very light  < 3   Social Support 2 Social Support: Yes, quite a bit  < 3   Daily Activities 3 Daily Activity: Some difficulty  < 3   Social Activities 2 Social Activities: Quite a bit  < 3   Pain 1 Pain: None  < 3   Overall Health 3 Overall Health: Good  < 3   Quality of Life 3 Quality of Life: Good & bad parts, about equal  < 3   Change in Health 2 Change in Health: A little better  < 3   Dietary       Rate your plate 51    > 58   Measurements       Weight 147 Weight - Scale: 66 7 kg (147 lb)    2 5 - 5%   BMI 21 1    19 - 25   Waist Circ       < 40 M / < 35 F   % Body fat     < 25 M / < 33 F   BP left arm               (systolic) 98   < 810   (diastolic) 68   < 90   Smoking #/day  (if applicable) 0   0   Lipids/Glucose (Date)       Total cholesterol    50 - 200   Triglycerides    < 150   HDL    40 - 60   LDL    < 100   A1C    4 0 - 5 6%   Fasting BG       COPD/CAT score 17

## 2018-07-03 NOTE — PROGRESS NOTES
CARDIAC/PULMONARY REHAB ASSESSMENT    Today's date: 7/3/2018   Patient name: Clau Evans      : 1945       MRN: [id number]  PCP: Vanda Ashby DO  Cardiologist: Edgar Preciado MD  Surgeon:   Pulmonologist: Lenka Maddox MD  Dx:   Encounter Diagnosis   Name Primary?     COPD [facility] exacerbation ([facility] )      Date of onset: 2018  Cultural needs:     Height: Height: 5' 10" (177 8 cm)   Weight:  Weight - Scale: 66 7 kg (147 lb)   Medical History:   Past Medical History:   Diagnosis Date    CAD (coronary artery disease)     Cataracts, bilateral     [facility]ch[facility]) [facility]     Heart attack ([facility] )     Hyperlipidemia     Hypertension        Physical Limitations:     Risk Factors   Cholesterol: no  Smoking: quit > 10 years ago   HTN: treated with medication  DM: no  Obesity: no   Inactivity: yes  Family History:   Family History   Problem Relation Age of Onset    Heart disease Father     Hypertension Father     Coronary artery disease Father     Hyperlipidemia Father     Heart disease Maternal Aunt     Heart disease Maternal Uncle     Cancer Neg Hx     Diabetes Neg Hx     Stroke Neg Hx     Arthritis Neg Hx     Sudden death Neg Hx         cardiac     Allergies: No Known Allergies  Other:     Current Medications:   Current Outpatient Prescriptions   Medication Sig Dispense Refill    albuterol (2 5 mg/3 mL) 0 083 % nebulizer solution Take 2 5 mg by nebulization every 6 (six) hours as needed for wheezing      albuterol (PROVENTIL HFA,VENTOLIN HFA) 90 mcg/act inhaler Inhale 2 puffs every 4 (four) hours as needed for wheezing or shortness of breath 3 Inhaler 2    alfuzosin (UROXATRAL) 10 mg 24 hr tablet One tablet daily, right after supper 90 tablet 0    aspirin (ECOTRIN LOW STRENGTH) 81 mg EC tablet Take 81 mg by mouth daily      carvedilol (COREG) 12 5 mg tablet Take 12 5 mg by mouth 2 (two) times a day with meals      fluticasone-salmeterol (ADVAIR) 250-50 mcg/dose inhaler Inhale 1 puff every 12 (twelve) hours for 90 days 13 Inhaler 2    ipratropium-albuterol (DUO-NEB) 0 5-2 5 mg/3 mL nebulizer solution Take 1 vial (3 mL total) by nebulization every 6 (six) hours for 90 days 1080 mL 2    rosuvastatin (CRESTOR) 40 MG tablet Take 1 tablet by mouth daily      sacubitril-valsartan (ENTRESTO) 24-26 MG TABS Take 1 tablet by mouth 2 (two) times a day 180 tablet 3     No current facility-administered medications for this visit            Current Functional Status    Occupation: retired   Recreation:   ADLs: independent   [place]: yes  Exercise: no  Other:     Short Term Program Goals: regain functional status prior to hospitalization    Long Term Goals: regular exercise    Ability to reach goals/rehabilitation potential: good    Projected return to function: good  Objective tests: 6 MW        Nutritional   Fats/Oils: tub margarine  Sodium: sometimes considers  Sweets/ETOH/Caffeine: often eats sweets   Dairy/Eggs: 3 or less weekly  Meats:    Beef: sometimes eats higher fat cuts   Fish: weekly  Chicken/Turkey: with skin  Pork/Ham:   Processed Meats: seldom  Fruits: 2-3 cups daily  Vegetables: 2-3 c daily  Grains/Beans: 3 servings whole grain  Supplements: vitamin B 12  Social: patient does cooking and shopping, dines out 1-2 times weekly    Clinical Implications: eat less sweets, incorporate olive oil into diet, consider sodium content    Goals: improved rate your plate    Emotional/Social  Marital status:   Rate 1-5:    Marriage:    Family:    Financial:    Relationships:    Spirituality:    Intellectual:     Life Stressors: health    Goals:     Domestic Violence Screening: No    Comments:

## 2018-07-03 NOTE — PROGRESS NOTES
Pulmonary Rehabilitation Plan of Care   Care Plan       Today's date: 7/3/2018   Visits: 24   Patient name: Mandy Campbell      : 1945  Age: 68 y o  MRN: [id number]  Referring Physician: DANIEL Wilson  Provider: [facility]  Clinician: Luke Dotson, RRT    Dx:   Encounter Diagnosis   Name Primary?  COPD with acute exacerbation [facility])      Date of onset: 2018    COMMENTS:  Initial evaluation for patient recently hospitalized for COPD exacerbation  Pt has 63 pack years smoking history, quit 11 years ago  PFTs from  Aug 2017 reveal FEV1 19% of predicted value  He wears supplemental oxygen at 3 lpm  Patient is motivated to regain strength physical function  Hx MI       Medication compliance: Yes   Comments:   Fall Risk: Low   Comments:     EXERCISE/ACTIVITY    Cardiopulmonary Goals:   Min: 30-50   HR:    RPE: 5-7 moderate to somewhat hard exercise   O2 sat: >90%    Modalities: Treadmill, UBE and NuStep  Strength trainin-15 repitations    Modalities: Lateral raise, Arm curl and ant flexion    Exercise Progression:     RPE 5 - 7  Home activity: inactive  Goals: home exercise days opposite MS  Education: Benefit of exercise   Plan:home exercise target 30 mins, 2 days opposite MS  Readiness to change: Contemplation    NUTRITION    Weight control:    Starting weight: 147 lb     Diabetes: N/A  Goals:healthy diet , BMI 21 1  Education:hydration  Plan: Education Video- Diet and Nutrition  Readiness to change: Contemplation    PSYCHOSOCIAL    Emotional:  PHQ9 score 4  Social support: good support ,lives alone , daughter nearby  Goals:   Education: Mental Health Education  Plan:   Readiness to change:     OTHER CORE COMPONENTS     Tobacco:   History   Smoking Status    Former Smoker    Packs/day: 1 00    Years: 52 00    Types: Cigarettes    Quit date: 2017   Smokeless Tobacco    Never Used     Oxygen: 3 lpm  Blood pressure: 98/68   Resting:     Exercise: Goals: consistent exercise >150 mins/wk  Education: Pulmonary Disease, physiology, Exercise, strength, flexibility, Conservation of energy, oxygen, breathing techniques, Mental Health, Diet,nutrition, Medication and Avoiding Infections  Plan: Exercise benefits  Readiness to change: Contemplation

## 2018-07-10 ENCOUNTER — OFFICE VISIT (OUTPATIENT)
Dept: PULMONOLOGY | Facility: CLINIC | Age: 73
End: 2018-07-10

## 2018-07-10 DIAGNOSIS — J44.1 COPD WITH ACUTE EXACERBATION (HCC): ICD-10-CM

## 2018-07-10 DIAGNOSIS — I50.42 CHRONIC COMBINED SYSTOLIC AND DIASTOLIC CONGESTIVE HEART FAILURE (HCC): Primary | Chronic | ICD-10-CM

## 2018-07-10 PROCEDURE — RECHECK: Performed by: INTERNAL MEDICINE

## 2018-07-10 NOTE — PROGRESS NOTES
Medical Director 30 day Pulmonary Rehabilitation Review    I certify that I have met with César Peralta face-to face to provide a 30 day progress review of his/her pulmonary rehabilitation program at [facility]  I have reviewed the most recent individualized treatment plan (ITP), outcomes assessment, and provided opportunity for discussion with the patient    Comments:No acute issues      Continue with current treatment plan yes    Please provide the following modifications to the current treatment plan: Zane Moore MD

## 2018-07-17 ENCOUNTER — TELEPHONE (OUTPATIENT)
Dept: CARDIOLOGY CLINIC | Facility: CLINIC | Age: 73
End: 2018-07-17

## 2018-07-17 DIAGNOSIS — I50.42 CHRONIC COMBINED SYSTOLIC AND DIASTOLIC CONGESTIVE HEART FAILURE (HCC): Chronic | ICD-10-CM

## 2018-07-18 ENCOUNTER — CLINICAL SUPPORT (OUTPATIENT)
Dept: PULMONOLOGY | Facility: CLINIC | Age: 73
End: 2018-07-18
Payer: COMMERCIAL

## 2018-07-18 DIAGNOSIS — J44.1 COPD WITH ACUTE EXACERBATION (HCC): ICD-10-CM

## 2018-07-18 PROCEDURE — G0424 PULMONARY REHAB W EXER: HCPCS

## 2018-07-18 NOTE — TELEPHONE ENCOUNTER
Entresto Prior Auth was denied due to documents showing NYHA class 1  Approval requires a class of II, III or IV    Is there another medication you would like to try in its place?

## 2018-07-23 ENCOUNTER — CLINICAL SUPPORT (OUTPATIENT)
Dept: PULMONOLOGY | Facility: CLINIC | Age: 73
End: 2018-07-23
Payer: COMMERCIAL

## 2018-07-23 ENCOUNTER — PROCEDURE VISIT (OUTPATIENT)
Dept: UROLOGY | Facility: MEDICAL CENTER | Age: 73
End: 2018-07-23
Payer: COMMERCIAL

## 2018-07-23 VITALS
HEIGHT: 70 IN | DIASTOLIC BLOOD PRESSURE: 62 MMHG | BODY MASS INDEX: 20.76 KG/M2 | WEIGHT: 145 LBS | SYSTOLIC BLOOD PRESSURE: 98 MMHG

## 2018-07-23 DIAGNOSIS — N39.41 URGE INCONTINENCE: ICD-10-CM

## 2018-07-23 DIAGNOSIS — N40.1 BPH WITH URINARY OBSTRUCTION: Primary | ICD-10-CM

## 2018-07-23 DIAGNOSIS — J44.1 COPD WITH ACUTE EXACERBATION (HCC): ICD-10-CM

## 2018-07-23 DIAGNOSIS — N13.8 BPH WITH URINARY OBSTRUCTION: Primary | ICD-10-CM

## 2018-07-23 PROCEDURE — 87086 URINE CULTURE/COLONY COUNT: CPT | Performed by: UROLOGY

## 2018-07-23 PROCEDURE — 52000 CYSTOURETHROSCOPY: CPT | Performed by: UROLOGY

## 2018-07-23 PROCEDURE — G0424 PULMONARY REHAB W EXER: HCPCS

## 2018-07-23 NOTE — LETTER
July 23, 2018     Theodora Cervantes DO  [address]  [place] [address]    Patient: Mandy Campbell   YOB: 1945   Date of Visit: 7/23/2018       Dear Dr Angella Marshall:    Thank you for referring Mandy Campbell to me for evaluation  Below are my notes for this consultation  If you have questions, please do not hesitate to call me  I look forward to following your patient along with you  Sincerely,        Eriberto Payan MD        CC: No Recipients  Eriberto Payan MD  7/23/2018  5:16 PM  Sign at close encounter  Assessment/Plan:  Worsening BPH, options discussed  Although higher risk due to his bad COPD, I think TURP is acceptable risk with probable spinal anesthesia  Potential complications discussed he gives informed consent we will schedule  Surgery high risk due to comorbidities of COPD, heart disease, elevated cholesterol, somewhat frail condition and oxygen dependency  No problem-specific Assessment & Plan notes found for this encounter  Diagnoses and all orders for this visit:    BPH with urinary obstruction  -     Urine culture    Urge incontinence    Other orders  -     Cystoscopy          Subjective:      Patient ID: Mandy Campbell is a 68 y o  male  Follow-up for worsening BPH over past two years     Urgency frequency, voids 5-10 times per night  Problem worse with increased fluids  Flomax no help in the past   We started Uroxatral last month, and he actually has more urgency incontinence since then  The following portions of the patient's history were reviewed and updated as appropriate: allergies, current medications, past family history, past medical history, past social history, past surgical history and problem list     Review of Systems   Respiratory: Positive for shortness of breath  Bad COPD, 24 hours oxygen, occasional short of breath   All other systems reviewed and are negative          Objective:      BP 98/62   Ht 5' 10" (1 778 m) Wt 65 8 kg (145 lb)   BMI 20 81 kg/m²           Physical Exam   Constitutional: He is oriented to person, place, and time  He appears well-developed and well-nourished  No distress  HENT:   Head: Normocephalic and atraumatic  Eyes: Conjunctivae are normal    Cardiovascular: Normal rate and regular rhythm  Pulmonary/Chest: He is in respiratory distress  Abdominal: Soft  Bowel sounds are normal  He exhibits no distension and no mass  There is no tenderness  Genitourinary:   Genitourinary Comments: Penis and testes normal   Prostate markedly enlarged no nodules   Neurological: He is alert and oriented to person, place, and time  Skin: Skin is warm and dry  He is not diaphoretic  Cystoscopy  Date/Time: 7/23/2018 5:14 PM  Performed by: Evangelist Maurice  Authorized by: Evangelist Maurice     Procedure details: cystoscopy    Additional Procedure Details: The patient was carefully  positioned supine on the examining table  Sterile preparation was performed on the urethra  Xylocaine jelly was instilled and left  Indwelling for the procedure  The 13 Macedonian flexible cystoscope was passed with the following findings:      Urethra:  Mild proximal bulbar stricture, scope go through with some gentle pressure    Prostate:  Markedly enlarged, 30 g resectable    Bladder:  Severe trabeculations, increased vascularity, but no lesion tumor stones     Residual urine:  50 mL    Patient tolerated the procedure well and was escorted from the examining table

## 2018-07-23 NOTE — PROGRESS NOTES
Assessment/Plan:  Worsening BPH, options discussed  Although higher risk due to his bad COPD, I think TURP is acceptable risk with probable spinal anesthesia  Potential complications discussed he gives informed consent we will schedule  Surgery high risk due to comorbidities of COPD, heart disease, elevated cholesterol, somewhat frail condition and oxygen dependency  No problem-specific Assessment & Plan notes found for this encounter  Diagnoses and all orders for this visit:    BPH with urinary obstruction  -     Urine culture    Urge incontinence    Other orders  -     Cystoscopy          Subjective:      Patient ID: Lottie aDsh is a 68 y o  male  Follow-up for worsening BPH over past two years     Urgency frequency, voids 5-10 times per night  Problem worse with increased fluids  Flomax no help in the past   We started Uroxatral last month, and he actually has more urgency incontinence since then  The following portions of the patient's history were reviewed and updated as appropriate: allergies, current medications, past family history, past medical history, past social history, past surgical history and problem list     Review of Systems   Respiratory: Positive for shortness of breath  Bad COPD, 24 hours oxygen, occasional short of breath   All other systems reviewed and are negative  Objective:      BP 98/62   Ht 5' 10" (1 778 m)   Wt 65 8 kg (145 lb)   BMI 20 81 kg/m²          Physical Exam   Constitutional: He is oriented to person, place, and time  He appears well-developed and well-nourished  No distress  HENT:   Head: Normocephalic and atraumatic  Eyes: Conjunctivae are normal    Cardiovascular: Normal rate and regular rhythm  Pulmonary/Chest: He is in respiratory distress  Abdominal: Soft  Bowel sounds are normal  He exhibits no distension and no mass  There is no tenderness     Genitourinary:   Genitourinary Comments: Penis and testes normal  Prostate markedly enlarged no nodules   Neurological: He is alert and oriented to person, place, and time  Skin: Skin is warm and dry  He is not diaphoretic  Cystoscopy  Date/Time: 7/23/2018 5:14 PM  Performed by: Yeni Faustin  Authorized by: Yeni Faustin     Procedure details: cystoscopy    Additional Procedure Details: The patient was carefully  positioned supine on the examining table  Sterile preparation was performed on the urethra  Xylocaine jelly was instilled and left  Indwelling for the procedure  The 13 Kyrgyz flexible cystoscope was passed with the following findings:      Urethra:  Mild proximal bulbar stricture, scope go through with some gentle pressure    Prostate:  Markedly enlarged, 30 g resectable    Bladder:  Severe trabeculations, increased vascularity, but no lesion tumor stones     Residual urine:  50 mL    Patient tolerated the procedure well and was escorted from the examining table

## 2018-07-24 DIAGNOSIS — N13.8 ENLARGED PROSTATE WITH URINARY OBSTRUCTION: Primary | ICD-10-CM

## 2018-07-24 DIAGNOSIS — N40.1 ENLARGED PROSTATE WITH URINARY OBSTRUCTION: Primary | ICD-10-CM

## 2018-07-25 ENCOUNTER — CLINICAL SUPPORT (OUTPATIENT)
Dept: PULMONOLOGY | Facility: CLINIC | Age: 73
End: 2018-07-25
Payer: COMMERCIAL

## 2018-07-25 ENCOUNTER — DOCUMENTATION (OUTPATIENT)
Dept: CARDIOLOGY CLINIC | Facility: MEDICAL CENTER | Age: 73
End: 2018-07-25

## 2018-07-25 DIAGNOSIS — J44.1 COPD WITH ACUTE EXACERBATION (HCC): ICD-10-CM

## 2018-07-25 LAB — BACTERIA UR CULT: NORMAL

## 2018-07-25 PROCEDURE — G0424 PULMONARY REHAB W EXER: HCPCS

## 2018-07-25 NOTE — TELEPHONE ENCOUNTER
It doesn't state an NYHA  anywhere that I can find  I believe the insurance pulled that info off of the echo which lists him as Grade 1 diastolic dysfunction  If you can write something stating he is NYHA III or addend your noted I can try to appeal it

## 2018-07-26 DIAGNOSIS — I50.42 CHRONIC COMBINED SYSTOLIC AND DIASTOLIC CONGESTIVE HEART FAILURE (HCC): Chronic | ICD-10-CM

## 2018-07-26 NOTE — TELEPHONE ENCOUNTER
[address] [place] [address] Appeal for [employer] approved through 7/25/2020   Reference # [id number]

## 2018-07-30 ENCOUNTER — CLINICAL SUPPORT (OUTPATIENT)
Dept: PULMONOLOGY | Facility: CLINIC | Age: 73
End: 2018-07-30
Payer: COMMERCIAL

## 2018-07-30 DIAGNOSIS — J44.1 COPD WITH ACUTE EXACERBATION (HCC): ICD-10-CM

## 2018-07-30 PROCEDURE — G0424 PULMONARY REHAB W EXER: HCPCS

## 2018-08-01 ENCOUNTER — CLINICAL SUPPORT (OUTPATIENT)
Dept: PULMONOLOGY | Facility: CLINIC | Age: 73
End: 2018-08-01
Payer: COMMERCIAL

## 2018-08-01 ENCOUNTER — APPOINTMENT (OUTPATIENT)
Dept: PREADMISSION TESTING | Facility: HOSPITAL | Age: 73
End: 2018-08-01
Payer: COMMERCIAL

## 2018-08-01 ENCOUNTER — ANESTHESIA EVENT (OUTPATIENT)
Dept: PERIOP | Facility: HOSPITAL | Age: 73
End: 2018-08-01
Payer: COMMERCIAL

## 2018-08-01 ENCOUNTER — APPOINTMENT (OUTPATIENT)
Dept: LAB | Facility: HOSPITAL | Age: 73
End: 2018-08-01
Attending: UROLOGY
Payer: COMMERCIAL

## 2018-08-01 DIAGNOSIS — N40.1 ENLARGED PROSTATE WITH URINARY OBSTRUCTION: ICD-10-CM

## 2018-08-01 DIAGNOSIS — J44.1 COPD WITH ACUTE EXACERBATION (HCC): ICD-10-CM

## 2018-08-01 DIAGNOSIS — N13.8 ENLARGED PROSTATE WITH URINARY OBSTRUCTION: ICD-10-CM

## 2018-08-01 LAB
ABO GROUP BLD: NORMAL
ALBUMIN SERPL BCP-MCNC: 3.2 G/DL (ref 3.5–5)
ALP SERPL-CCNC: 92 U/L (ref 46–116)
ALT SERPL W P-5'-P-CCNC: 9 U/L (ref 12–78)
ANION GAP SERPL CALCULATED.3IONS-SCNC: 3 MMOL/L (ref 4–13)
APTT PPP: 28 SECONDS (ref 24–36)
AST SERPL W P-5'-P-CCNC: 13 U/L (ref 5–45)
BASOPHILS # BLD AUTO: 0.01 THOUSANDS/ΜL (ref 0–0.1)
BASOPHILS NFR BLD AUTO: 0 % (ref 0–1)
BILIRUB SERPL-MCNC: 0.82 MG/DL (ref 0.2–1)
BLD GP AB SCN SERPL QL: NEGATIVE
BUN SERPL-MCNC: 11 MG/DL (ref 5–25)
CALCIUM SERPL-MCNC: 9.1 MG/DL (ref 8.3–10.1)
CHLORIDE SERPL-SCNC: 102 MMOL/L (ref 100–108)
CO2 SERPL-SCNC: 34 MMOL/L (ref 21–32)
CREAT SERPL-MCNC: 0.86 MG/DL (ref 0.6–1.3)
EOSINOPHIL # BLD AUTO: 0.2 THOUSAND/ΜL (ref 0–0.61)
EOSINOPHIL NFR BLD AUTO: 4 % (ref 0–6)
ERYTHROCYTE [DISTWIDTH] IN BLOOD BY AUTOMATED COUNT: 15 % (ref 11.6–15.1)
GFR SERPL CREATININE-BSD FRML MDRD: 87 ML/MIN/1.73SQ M
GLUCOSE SERPL-MCNC: 73 MG/DL (ref 65–140)
HCT VFR BLD AUTO: 33.8 % (ref 36.5–49.3)
HGB BLD-MCNC: 10.8 G/DL (ref 12–17)
INR PPP: 1.1 (ref 0.86–1.17)
LYMPHOCYTES # BLD AUTO: 1.22 THOUSANDS/ΜL (ref 0.6–4.47)
LYMPHOCYTES NFR BLD AUTO: 22 % (ref 14–44)
MCH RBC QN AUTO: 30.9 PG (ref 26.8–34.3)
MCHC RBC AUTO-ENTMCNC: 32 G/DL (ref 31.4–37.4)
MCV RBC AUTO: 97 FL (ref 82–98)
MONOCYTES # BLD AUTO: 1 THOUSAND/ΜL (ref 0.17–1.22)
MONOCYTES NFR BLD AUTO: 18 % (ref 4–12)
NEUTROPHILS # BLD AUTO: 3.17 THOUSANDS/ΜL (ref 1.85–7.62)
NEUTS SEG NFR BLD AUTO: 57 % (ref 43–75)
PLATELET # BLD AUTO: 152 THOUSANDS/UL (ref 149–390)
PMV BLD AUTO: 11.2 FL (ref 8.9–12.7)
POTASSIUM SERPL-SCNC: 3.7 MMOL/L (ref 3.5–5.3)
PROT SERPL-MCNC: 6.7 G/DL (ref 6.4–8.2)
PROTHROMBIN TIME: 14.3 SECONDS (ref 11.8–14.2)
RBC # BLD AUTO: 3.5 MILLION/UL (ref 3.88–5.62)
RH BLD: POSITIVE
SODIUM SERPL-SCNC: 139 MMOL/L (ref 136–145)
SPECIMEN EXPIRATION DATE: NORMAL
WBC # BLD AUTO: 5.6 THOUSAND/UL (ref 4.31–10.16)

## 2018-08-01 PROCEDURE — 86850 RBC ANTIBODY SCREEN: CPT

## 2018-08-01 PROCEDURE — 85730 THROMBOPLASTIN TIME PARTIAL: CPT

## 2018-08-01 PROCEDURE — 86901 BLOOD TYPING SEROLOGIC RH(D): CPT

## 2018-08-01 PROCEDURE — 36415 COLL VENOUS BLD VENIPUNCTURE: CPT

## 2018-08-01 PROCEDURE — 80053 COMPREHEN METABOLIC PANEL: CPT

## 2018-08-01 PROCEDURE — 85025 COMPLETE CBC W/AUTO DIFF WBC: CPT

## 2018-08-01 PROCEDURE — 85610 PROTHROMBIN TIME: CPT

## 2018-08-01 PROCEDURE — G0424 PULMONARY REHAB W EXER: HCPCS

## 2018-08-01 PROCEDURE — 86900 BLOOD TYPING SEROLOGIC ABO: CPT

## 2018-08-01 RX ORDER — SODIUM CHLORIDE 9 MG/ML
125 INJECTION, SOLUTION INTRAVENOUS CONTINUOUS
Status: CANCELLED | OUTPATIENT
Start: 2018-08-14 | End: 2019-08-14

## 2018-08-01 NOTE — PRE-PROCEDURE INSTRUCTIONS
Pre-Surgery Instructions:   Medication Instructions    albuterol (2 5 mg/3 mL) 0 083 % nebulizer solution Patient was instructed by Physician and understands   albuterol (PROVENTIL HFA,VENTOLIN HFA) 90 mcg/act inhaler Patient was instructed by Physician and understands   alfuzosin (UROXATRAL) 10 mg 24 hr tablet Patient was instructed by Physician and understands   aspirin (ECOTRIN LOW STRENGTH) 81 mg EC tablet Patient was instructed to contact Physician for medication instruction   carvedilol (COREG) 12 5 mg tablet Patient was instructed by Physician and understands   fluticasone-salmeterol (ADVAIR) 250-50 mcg/dose inhaler Patient was instructed by Physician and understands   ipratropium-albuterol (DUO-NEB) 0 5-2 5 mg/3 mL nebulizer solution Patient was instructed by Physician and understands   OXYGEN-HELIUM IN Patient was instructed by Physician and understands   rosuvastatin (CRESTOR) 40 MG tablet Patient was instructed by Physician and understands   sacubitril-valsartan (ENTRESTO) 24-26 MG TABS Patient was instructed by Physician and understands  Pt instructed by Dr Miguelito Durán to take*carvedilol*with a sip of water the morning of surgery and use Advair IN and Duo Neb/Nebulizer  Pt given/reviewed [facility] preop instructions and chlorhexadine soap  Pt to hold/NSAIDS/vitamins/herbal supplements one week before surgery or as per Dr Kervin Gardner   And he plans to check with Dr Samantha Davidson re: aspirin when and if to hold

## 2018-08-02 NOTE — PROGRESS NOTES
Pulmonary Rehabilitation Plan of Care   30 day       Today's date: 2018   Visits:  Completed 5 sessions  Patient name: César Peralta      : 1945  Age: 68 y o  MRN: [id number]  Referring Physician: DANIEL Macias  Provider: [address] [place] [address]  Clinician: Claudette Morel, RRT    Dx:   Encounter Diagnosis   Name Primary?  COPD with acute exacerbation [facility])      Date of onset: 2018    COMMENTS: Patient has completed 5 sessions of pulmonary rehabilitation  He walks on the treadmill for 15 minutes at 1 3 mph and cycles the Nustep for 15 minutes at level 2  For upper body exercise he uses the UBE for 10 minutes at level 4 and some light free weight exercises  He maintains an O2 saturation > 90%  wearing 3 lpm nasal canula    Medication compliance: Yes   Comments:   Fall Risk: Low   Comments:     EXERCISE/ACTIVITY    Cardiopulmonary Goals:   Min: 30-50   HR:    RPE: 5-7 moderate to somewhat hard exercise   O2 sat: >90%    Modalities: Treadmill, UBE and NuStep  Strength trainin-15 repitations    Modalities: Lateral raise, Arm curl and ant flexion    Exercise Progression:     RPE 5 - 7  Home activity: inactive  Goals: home exercise days opposite [place]  Education: Benefit of exercise   Plan:home exercise target 30 mins, 2 days opposite [place]  Readiness to change: Contemplation    NUTRITION    Weight control:    Starting weight: 147 lb     Diabetes: N/A  Goals:healthy diet , BMI 21 1  Education:hydration  Plan: Education Video- Diet and Nutrition  Readiness to change: Contemplation    PSYCHOSOCIAL    Emotional:  PHQ9 score 4  Social support: good support ,lives alone , daughter nearby  Goals:   Education: Mental Health Education  Plan:   Readiness to change:     OTHER CORE COMPONENTS     Tobacco:   History   Smoking Status    Former Smoker    Packs/day: 1 00    Years: 52 00    Types: Cigarettes    Quit date: 2017   Smokeless Tobacco    Never Used     Comment: had about 10cigs in April 2018     Oxygen: 3 lpm  Blood pressure: 98/68   Resting: Resting BP: 92/58   Exercise: Recovery BP: 108/60  Goals: consistent exercise >150 mins/wk  Education: Pulmonary Disease, physiology, Exercise, strength, flexibility, Conservation of energy, oxygen, breathing techniques, Mental Health, Diet,nutrition, Medication and Avoiding Infections  Plan: Exercise benefits  Readiness to change: Contemplation

## 2018-08-03 ENCOUNTER — TELEPHONE (OUTPATIENT)
Dept: CARDIOLOGY CLINIC | Facility: CLINIC | Age: 73
End: 2018-08-03

## 2018-08-06 ENCOUNTER — CLINICAL SUPPORT (OUTPATIENT)
Dept: PULMONOLOGY | Facility: CLINIC | Age: 73
End: 2018-08-06
Payer: COMMERCIAL

## 2018-08-06 DIAGNOSIS — J44.1 COPD WITH ACUTE EXACERBATION (HCC): ICD-10-CM

## 2018-08-06 PROCEDURE — G0424 PULMONARY REHAB W EXER: HCPCS

## 2018-08-08 ENCOUNTER — OFFICE VISIT (OUTPATIENT)
Dept: PULMONOLOGY | Facility: CLINIC | Age: 73
End: 2018-08-08

## 2018-08-08 ENCOUNTER — CLINICAL SUPPORT (OUTPATIENT)
Dept: PULMONOLOGY | Facility: CLINIC | Age: 73
End: 2018-08-08
Payer: COMMERCIAL

## 2018-08-08 DIAGNOSIS — J96.12 CHRONIC RESPIRATORY FAILURE WITH HYPOXIA AND HYPERCAPNIA (HCC): ICD-10-CM

## 2018-08-08 DIAGNOSIS — J44.1 COPD WITH ACUTE EXACERBATION (HCC): ICD-10-CM

## 2018-08-08 DIAGNOSIS — J96.11 CHRONIC RESPIRATORY FAILURE WITH HYPOXIA AND HYPERCAPNIA (HCC): ICD-10-CM

## 2018-08-08 DIAGNOSIS — J44.1 COPD WITH ACUTE EXACERBATION (HCC): Primary | ICD-10-CM

## 2018-08-08 PROCEDURE — RECHECK: Performed by: INTERNAL MEDICINE

## 2018-08-08 PROCEDURE — G0424 PULMONARY REHAB W EXER: HCPCS

## 2018-08-08 NOTE — PROGRESS NOTES
Medical Director 30 day Pulmonary Rehabilitation Review    I certify that I have met with Kelley Florentino face-to face to provide a 30 day progress review of his/her pulmonary rehabilitation program at [address] [place] [address]  I have reviewed the most recent individualized treatment plan (ITP), outcomes assessment, and provided opportunity for discussion with the patient    Comments:  No acute events      Continue with current treatment plan yes    Please provide the following modifications to the current treatment plan: Willow Parada MD

## 2018-08-13 ENCOUNTER — CLINICAL SUPPORT (OUTPATIENT)
Dept: PULMONOLOGY | Facility: CLINIC | Age: 73
End: 2018-08-13
Payer: COMMERCIAL

## 2018-08-13 DIAGNOSIS — J44.1 COPD WITH ACUTE EXACERBATION (HCC): ICD-10-CM

## 2018-08-13 DIAGNOSIS — J44.9 CHRONIC OBSTRUCTIVE PULMONARY DISEASE, UNSPECIFIED COPD TYPE (HCC): Primary | ICD-10-CM

## 2018-08-13 PROCEDURE — G0424 PULMONARY REHAB W EXER: HCPCS

## 2018-08-13 NOTE — H&P
HISTORY AND PHYSICAL  ? ? Patient Name: Jose Hammond  Patient MRN: [id number]  Attending Provider: Wayne Alvarez MD  Service: Urology  Chief Complaint    BPH     HPI   Jose Hammond is a 68 y o  male with  long-term BPH, much worse in the past two years  No help with Flomax or Uroxatral   Symptoms of decreased flow, ururgency, urge incontinence, nocturia  Cysto showed very large prostate, severely obstructed bladder  Patient is high risk due to his other comorbidities, and we have thoroughly evaluated his condition and have agreed upon TURP   I plan  TURP  Potential risks and complications discussed, and informed consent was given by the patient  Medications  Meds/Allergies     No current facility-administered medications for this encounter  Cannot display prior to admission medications because the patient has not been admitted in this contact  No current facility-administered medications for this encounter       Current Outpatient Prescriptions:     albuterol (2 5 mg/3 mL) 0 083 % nebulizer solution, Take 2 5 mg by nebulization every 6 (six) hours as needed for wheezing, Disp: , Rfl:     albuterol (PROVENTIL HFA,VENTOLIN HFA) 90 mcg/act inhaler, Inhale 2 puffs every 4 (four) hours as needed for wheezing or shortness of breath, Disp: 3 Inhaler, Rfl: 2    alfuzosin (UROXATRAL) 10 mg 24 hr tablet, One tablet daily, right after supper, Disp: 90 tablet, Rfl: 0    aspirin (ECOTRIN LOW STRENGTH) 81 mg EC tablet, Take 81 mg by mouth daily, Disp: , Rfl:     carvedilol (COREG) 12 5 mg tablet, Take 12 5 mg by mouth 2 (two) times a day with meals, Disp: , Rfl:     fluticasone-salmeterol (ADVAIR) 250-50 mcg/dose inhaler, Inhale 1 puff every 12 (twelve) hours for 90 days, Disp: 13 Inhaler, Rfl: 2    ipratropium-albuterol (DUO-NEB) 0 5-2 5 mg/3 mL nebulizer solution, Take 1 vial (3 mL total) by nebulization every 6 (six) hours for 90 days (Patient taking differently: Take 3 mL by nebulization 4 (four) times a day  ), Disp: 1080 mL, Rfl: 2    OXYGEN-HELIUM IN, Inhale 3 liters NC continuously, Disp: , Rfl:     rosuvastatin (CRESTOR) 40 MG tablet, Take 1 tablet by mouth daily, Disp: , Rfl:     sacubitril-valsartan (ENTRESTO) 24-26 MG TABS, Take 1 tablet by mouth 2 (two) times a day, Disp: 180 tablet, Rfl: 3  Review of Systems  10 point review of systems negative except as noted in HPI  Allergies  No Known Allergies  PMH  Past Medical History:   Diagnosis Date    CAD (coronary artery disease)     [facility]     melanoma left arm and left chest    Cataracts, bilateral     COPD [facility]chronic obstructive pulmonary disease) [facility]     Cough with sputum     "once in awhile"    Dental bridge present     upper    Enlarged prostate     [facility]     Hiatal hernia     History of coronary artery stent placement     History of pneumonia     History of transfusion     "possibly 30 yrs ago or so"    Hyperlipidemia     Hypertension     Oxygen dependent     Pulmonary emphysema (HCC)     Shortness of breath     Urinary frequency     Urinary urgency      Past surgical history  Past Surgical History:   Procedure Laterality Date    ANKLE SURGERY Right     COLONOSCOPY  2008    CORONARY STENT PLACEMENT  2008    HERNIA REPAIR      right inguinal hernia    LASIK Bilateral     SKIN CANCER EXCISION Left     arm and chest/melanoma    TONSILLECTOMY      WISDOM TOOTH EXTRACTION       Social history  Social History   Substance Use Topics    Smoking status: Former Smoker     Packs/day: 1 00     Years: 52 00     Types: Cigarettes     Quit date: 7/1/2017    Smokeless tobacco: Never Used      Comment: had about 10cigs in April 2018    Alcohol use 1 2 oz/week     2 Glasses of wine per week      Comment: 2 glasses of wine per week , heavier drinker years ago     ?   Physical Exam  General appearance: Anxious, discomfort from his poor breathing  Head: Normocephalic, without obvious abnormality, atraumatic  Neck: no adenopathy, no carotid bruit, no JVD, supple, symmetrical, trachea midline and thyroid not enlarged, symmetric, no tenderness/mass/nodules  Lungs: Short of breath, wheezes  Heart: regular rate and rhythm, S1, S2 normal, no murmur, click, rub or gallop  Abdomen: soft, non-tender; bowel sounds normal; no masses,  no organomegaly  Male genitalia: normal  Extremities: extremities normal, warm and well-perfused; no cyanosis, clubbing, or edema  Pulses: 2+ and symmetric  Lymph nodes: Cervical, supraclavicular, and axillary nodes normal   Neurologic: Grossly normal  Meka Snyder MD

## 2018-08-14 ENCOUNTER — ANESTHESIA (OUTPATIENT)
Dept: PERIOP | Facility: HOSPITAL | Age: 73
End: 2018-08-14
Payer: COMMERCIAL

## 2018-08-14 ENCOUNTER — HOSPITAL ENCOUNTER (OUTPATIENT)
Facility: HOSPITAL | Age: 73
Setting detail: OUTPATIENT SURGERY
Discharge: HOME | End: 2018-08-15
Attending: UROLOGY | Admitting: UROLOGY
Payer: COMMERCIAL

## 2018-08-14 DIAGNOSIS — N40.1 ENLARGED PROSTATE WITH URINARY OBSTRUCTION: ICD-10-CM

## 2018-08-14 DIAGNOSIS — N13.8 ENLARGED PROSTATE WITH URINARY OBSTRUCTION: ICD-10-CM

## 2018-08-14 LAB
INR PPP: 1.1 (ref 0.86–1.17)
PROTHROMBIN TIME: 14.3 SECONDS (ref 11.8–14.2)

## 2018-08-14 PROCEDURE — 94760 N-INVAS EAR/PLS OXIMETRY 1: CPT

## 2018-08-14 PROCEDURE — 85610 PROTHROMBIN TIME: CPT | Performed by: ANESTHESIOLOGY

## 2018-08-14 PROCEDURE — 52601 PROSTATECTOMY (TURP): CPT | Performed by: UROLOGY

## 2018-08-14 PROCEDURE — 88305 TISSUE EXAM BY PATHOLOGIST: CPT | Performed by: PATHOLOGY

## 2018-08-14 RX ORDER — ONDANSETRON 2 MG/ML
INJECTION INTRAMUSCULAR; INTRAVENOUS AS NEEDED
Status: DISCONTINUED | OUTPATIENT
Start: 2018-08-14 | End: 2018-08-14 | Stop reason: SURG

## 2018-08-14 RX ORDER — OXYCODONE HYDROCHLORIDE AND ACETAMINOPHEN 5; 325 MG/1; MG/1
1 TABLET ORAL EVERY 4 HOURS PRN
Status: DISCONTINUED | OUTPATIENT
Start: 2018-08-14 | End: 2018-08-15 | Stop reason: HOSPADM

## 2018-08-14 RX ORDER — ACETAMINOPHEN 325 MG/1
650 TABLET ORAL EVERY 6 HOURS PRN
Status: DISCONTINUED | OUTPATIENT
Start: 2018-08-14 | End: 2018-08-15 | Stop reason: HOSPADM

## 2018-08-14 RX ORDER — LANOLIN ALCOHOL/MO/W.PET/CERES
1000 CREAM (GRAM) TOPICAL DAILY
COMMUNITY

## 2018-08-14 RX ORDER — ONDANSETRON 2 MG/ML
4 INJECTION INTRAMUSCULAR; INTRAVENOUS ONCE AS NEEDED
Status: DISCONTINUED | OUTPATIENT
Start: 2018-08-14 | End: 2018-08-14

## 2018-08-14 RX ORDER — PROPOFOL 10 MG/ML
INJECTION, EMULSION INTRAVENOUS AS NEEDED
Status: DISCONTINUED | OUTPATIENT
Start: 2018-08-14 | End: 2018-08-14 | Stop reason: SURG

## 2018-08-14 RX ORDER — SODIUM CHLORIDE, SODIUM LACTATE, POTASSIUM CHLORIDE, CALCIUM CHLORIDE 600; 310; 30; 20 MG/100ML; MG/100ML; MG/100ML; MG/100ML
75 INJECTION, SOLUTION INTRAVENOUS CONTINUOUS
Status: DISCONTINUED | OUTPATIENT
Start: 2018-08-14 | End: 2018-08-15 | Stop reason: HOSPADM

## 2018-08-14 RX ORDER — FENTANYL CITRATE 50 UG/ML
INJECTION, SOLUTION INTRAMUSCULAR; INTRAVENOUS AS NEEDED
Status: DISCONTINUED | OUTPATIENT
Start: 2018-08-14 | End: 2018-08-14 | Stop reason: SURG

## 2018-08-14 RX ORDER — LEVALBUTEROL 1.25 MG/.5ML
1.25 SOLUTION, CONCENTRATE RESPIRATORY (INHALATION)
Status: DISCONTINUED | OUTPATIENT
Start: 2018-08-14 | End: 2018-08-15 | Stop reason: HOSPADM

## 2018-08-14 RX ORDER — FLUTICASONE FUROATE AND VILANTEROL 100; 25 UG/1; UG/1
1 POWDER RESPIRATORY (INHALATION) DAILY
Status: DISCONTINUED | OUTPATIENT
Start: 2018-08-15 | End: 2018-08-15 | Stop reason: HOSPADM

## 2018-08-14 RX ORDER — BACITRACIN, NEOMYCIN, POLYMYXIN B 400; 3.5; 5 [USP'U]/G; MG/G; [USP'U]/G
1 OINTMENT TOPICAL 2 TIMES DAILY
Status: DISCONTINUED | OUTPATIENT
Start: 2018-08-14 | End: 2018-08-15 | Stop reason: HOSPADM

## 2018-08-14 RX ORDER — FENTANYL CITRATE/PF 50 MCG/ML
25 SYRINGE (ML) INJECTION
Status: DISCONTINUED | OUTPATIENT
Start: 2018-08-14 | End: 2018-08-14

## 2018-08-14 RX ORDER — SODIUM CHLORIDE 9 MG/ML
125 INJECTION, SOLUTION INTRAVENOUS CONTINUOUS
Status: DISCONTINUED | OUTPATIENT
Start: 2018-08-14 | End: 2018-08-14

## 2018-08-14 RX ORDER — OXYBUTYNIN CHLORIDE 5 MG/1
5 TABLET ORAL 2 TIMES DAILY
Status: DISCONTINUED | OUTPATIENT
Start: 2018-08-14 | End: 2018-08-15 | Stop reason: HOSPADM

## 2018-08-14 RX ORDER — ALBUTEROL SULFATE 90 UG/1
2 AEROSOL, METERED RESPIRATORY (INHALATION) EVERY 4 HOURS PRN
Status: DISCONTINUED | OUTPATIENT
Start: 2018-08-14 | End: 2018-08-15 | Stop reason: HOSPADM

## 2018-08-14 RX ORDER — ALBUTEROL SULFATE 2.5 MG/3ML
2.5 SOLUTION RESPIRATORY (INHALATION) EVERY 6 HOURS PRN
Status: DISCONTINUED | OUTPATIENT
Start: 2018-08-14 | End: 2018-08-15 | Stop reason: HOSPADM

## 2018-08-14 RX ORDER — CHOLECALCIFEROL (VITAMIN D3) 125 MCG
1000 CAPSULE ORAL DAILY
Status: DISCONTINUED | OUTPATIENT
Start: 2018-08-15 | End: 2018-08-15 | Stop reason: HOSPADM

## 2018-08-14 RX ORDER — DEXAMETHASONE SODIUM PHOSPHATE 4 MG/ML
INJECTION, SOLUTION INTRA-ARTICULAR; INTRALESIONAL; INTRAMUSCULAR; INTRAVENOUS; SOFT TISSUE AS NEEDED
Status: DISCONTINUED | OUTPATIENT
Start: 2018-08-14 | End: 2018-08-14 | Stop reason: SURG

## 2018-08-14 RX ORDER — SORBITOL 30 G/1000ML
IRRIGANT IRRIGATION AS NEEDED
Status: DISCONTINUED | OUTPATIENT
Start: 2018-08-14 | End: 2018-08-14 | Stop reason: HOSPADM

## 2018-08-14 RX ORDER — HEPARIN SODIUM 5000 [USP'U]/ML
5000 INJECTION, SOLUTION INTRAVENOUS; SUBCUTANEOUS EVERY 8 HOURS SCHEDULED
Status: DISCONTINUED | OUTPATIENT
Start: 2018-08-14 | End: 2018-08-15 | Stop reason: HOSPADM

## 2018-08-14 RX ORDER — MIDAZOLAM HYDROCHLORIDE 1 MG/ML
INJECTION INTRAMUSCULAR; INTRAVENOUS AS NEEDED
Status: DISCONTINUED | OUTPATIENT
Start: 2018-08-14 | End: 2018-08-14 | Stop reason: SURG

## 2018-08-14 RX ORDER — IPRATROPIUM BROMIDE AND ALBUTEROL SULFATE 2.5; .5 MG/3ML; MG/3ML
3 SOLUTION RESPIRATORY (INHALATION)
Status: DISCONTINUED | OUTPATIENT
Start: 2018-08-14 | End: 2018-08-14

## 2018-08-14 RX ORDER — ONDANSETRON 2 MG/ML
4 INJECTION INTRAMUSCULAR; INTRAVENOUS EVERY 6 HOURS PRN
Status: DISCONTINUED | OUTPATIENT
Start: 2018-08-14 | End: 2018-08-15 | Stop reason: HOSPADM

## 2018-08-14 RX ORDER — OXYCODONE HYDROCHLORIDE AND ACETAMINOPHEN 5; 325 MG/1; MG/1
2 TABLET ORAL EVERY 4 HOURS PRN
Status: DISCONTINUED | OUTPATIENT
Start: 2018-08-14 | End: 2018-08-15 | Stop reason: HOSPADM

## 2018-08-14 RX ORDER — MAGNESIUM HYDROXIDE 1200 MG/15ML
LIQUID ORAL AS NEEDED
Status: DISCONTINUED | OUTPATIENT
Start: 2018-08-14 | End: 2018-08-14 | Stop reason: HOSPADM

## 2018-08-14 RX ORDER — HEPARIN SODIUM 5000 [USP'U]/ML
5000 INJECTION, SOLUTION INTRAVENOUS; SUBCUTANEOUS ONCE
Status: COMPLETED | OUTPATIENT
Start: 2018-08-14 | End: 2018-08-14

## 2018-08-14 RX ORDER — CARVEDILOL 12.5 MG/1
12.5 TABLET ORAL 2 TIMES DAILY WITH MEALS
Status: DISCONTINUED | OUTPATIENT
Start: 2018-08-14 | End: 2018-08-15 | Stop reason: HOSPADM

## 2018-08-14 RX ORDER — MORPHINE SULFATE 4 MG/ML
4 INJECTION, SOLUTION INTRAMUSCULAR; INTRAVENOUS EVERY 2 HOUR PRN
Status: DISCONTINUED | OUTPATIENT
Start: 2018-08-14 | End: 2018-08-15 | Stop reason: HOSPADM

## 2018-08-14 RX ADMIN — CEFAZOLIN SODIUM 2000 MG: 2 SOLUTION INTRAVENOUS at 16:20

## 2018-08-14 RX ADMIN — PROPOFOL 200 MG: 10 INJECTION, EMULSION INTRAVENOUS at 16:13

## 2018-08-14 RX ADMIN — LEVALBUTEROL HYDROCHLORIDE 1.25 MG: 1.25 SOLUTION, CONCENTRATE RESPIRATORY (INHALATION) at 19:54

## 2018-08-14 RX ADMIN — CARVEDILOL 12.5 MG: 12.5 TABLET, FILM COATED ORAL at 19:18

## 2018-08-14 RX ADMIN — SACUBITRIL AND VALSARTAN 1 TABLET: 24; 26 TABLET, FILM COATED ORAL at 19:18

## 2018-08-14 RX ADMIN — FENTANYL CITRATE 50 MCG: 50 INJECTION, SOLUTION INTRAMUSCULAR; INTRAVENOUS at 16:13

## 2018-08-14 RX ADMIN — FENTANYL CITRATE 25 MCG: 50 INJECTION INTRAMUSCULAR; INTRAVENOUS at 17:27

## 2018-08-14 RX ADMIN — MIDAZOLAM 2 MG: 1 INJECTION INTRAMUSCULAR; INTRAVENOUS at 16:09

## 2018-08-14 RX ADMIN — OXYCODONE HYDROCHLORIDE AND ACETAMINOPHEN 1 TABLET: 5; 325 TABLET ORAL at 19:18

## 2018-08-14 RX ADMIN — SODIUM CHLORIDE, SODIUM LACTATE, POTASSIUM CHLORIDE, AND CALCIUM CHLORIDE 75 ML/HR: .6; .31; .03; .02 INJECTION, SOLUTION INTRAVENOUS at 19:18

## 2018-08-14 RX ADMIN — SODIUM CHLORIDE 125 ML/HR: 0.9 INJECTION, SOLUTION INTRAVENOUS at 14:24

## 2018-08-14 RX ADMIN — PROPOFOL 50 MG: 10 INJECTION, EMULSION INTRAVENOUS at 16:25

## 2018-08-14 RX ADMIN — HEPARIN SODIUM 5000 UNITS: 5000 INJECTION, SOLUTION INTRAVENOUS; SUBCUTANEOUS at 21:24

## 2018-08-14 RX ADMIN — DEXAMETHASONE SODIUM PHOSPHATE 4 MG: 4 INJECTION, SOLUTION INTRAMUSCULAR; INTRAVENOUS at 16:20

## 2018-08-14 RX ADMIN — FENTANYL CITRATE 25 MCG: 50 INJECTION INTRAMUSCULAR; INTRAVENOUS at 17:21

## 2018-08-14 RX ADMIN — FENTANYL CITRATE 25 MCG: 50 INJECTION INTRAMUSCULAR; INTRAVENOUS at 17:40

## 2018-08-14 RX ADMIN — LIDOCAINE HYDROCHLORIDE 60 MG: 20 INJECTION, SOLUTION INTRAVENOUS at 16:13

## 2018-08-14 RX ADMIN — HEPARIN SODIUM 5000 UNITS: 5000 INJECTION, SOLUTION INTRAVENOUS; SUBCUTANEOUS at 15:13

## 2018-08-14 RX ADMIN — IPRATROPIUM BROMIDE 0.5 MG: 0.5 SOLUTION RESPIRATORY (INHALATION) at 19:54

## 2018-08-14 RX ADMIN — ONDANSETRON HYDROCHLORIDE 4 MG: 2 INJECTION, SOLUTION INTRAVENOUS at 16:20

## 2018-08-14 RX ADMIN — OXYBUTYNIN CHLORIDE 5 MG: 5 TABLET ORAL at 19:17

## 2018-08-14 NOTE — OP NOTE
OPERATIVE REPORT  PATIENT NAME: Esther Matamoros    :  1945  MRN: [id number]  Pt Location: [facility] OR ROOM 06    SURGERY DATE: 2018    Surgeon(s) and Role:     * Caridad Romberg, MD - Primary    Preop Diagnosis:  Enlarged prostate with urinary obstruction [N40 1, N13 8]    Post-Op Diagnosis Codes:     * Enlarged prostate with urinary obstruction [N40 1, N13 8]    Procedure(s) (LRB):  CYSTOSCOPY, TRANSURETHRAL RESECTION OF PROSTATE (TURP) (N/A)    Specimen(s):  ID Type Source Tests Collected by Time Destination   1 : Prostate Tissue Tissue Prostate TISSUE EXAM Caridad Romberg, MD 2018 1634        Estimated Blood Loss:   15 mL    Drains:  Continuous Bladder Irrigation Three-way (Active)   Number of days: 0       Anesthesia Type:   Spinal    Operative Indications:  Enlarged prostate with urinary obstruction [N40 1, N13 8]      Operative Findings:  Large prostate, trabeculated bladder    Complications:   None    Procedure and Technique:      The patient was brought into the OR, properly identified and positioned on the table  General anesthesia was induced, and he was prepped using betadine solution and draped in lithotomy position  The 32 F resectoscope sheath was introduced, and inspection confirmed hyperplasia of the prostate and secondary obstructive changes in the bladder  Prostate tissue was resected circumferentially from the bladder neck out to the apex, down to intermittent capsular fibers  There were no capsular perforations noted  Pieces of tissue were evacuated using the Urovac bottle type evacuator  Hemostasis was obtained using electrocautery  Final inspection revealed no damage to the ureteral orifices, well resected prostate, no significant bleeding, no retained pieces of tissue, and no damage to the sphincter  The scope was removed, a 25 Fcatheter was inserted, irrigated clear, and hooked up to gravity drainage   The patient left the OR in good condition     I was present for the entire procedure    Patient Disposition:  PACU     SIGNATURE: Isabela Vega MD  DATE: August 14, 2018  TIME: 5:08 PM

## 2018-08-15 ENCOUNTER — APPOINTMENT (OUTPATIENT)
Dept: PULMONOLOGY | Facility: CLINIC | Age: 73
End: 2018-08-15
Payer: COMMERCIAL

## 2018-08-15 VITALS
RESPIRATION RATE: 17 BRPM | HEIGHT: 70 IN | BODY MASS INDEX: 21.62 KG/M2 | WEIGHT: 151 LBS | DIASTOLIC BLOOD PRESSURE: 67 MMHG | HEART RATE: 70 BPM | TEMPERATURE: 98.4 F | OXYGEN SATURATION: 96 % | SYSTOLIC BLOOD PRESSURE: 93 MMHG

## 2018-08-15 PROCEDURE — 94760 N-INVAS EAR/PLS OXIMETRY 1: CPT

## 2018-08-15 PROCEDURE — 99024 POSTOP FOLLOW-UP VISIT: CPT | Performed by: UROLOGY

## 2018-08-15 PROCEDURE — 94640 AIRWAY INHALATION TREATMENT: CPT

## 2018-08-15 RX ORDER — SULFAMETHOXAZOLE AND TRIMETHOPRIM 800; 160 MG/1; MG/1
1 TABLET ORAL 2 TIMES DAILY
Qty: 10 TABLET | Refills: 0 | Status: SHIPPED | OUTPATIENT
Start: 2018-08-15 | End: 2018-08-20

## 2018-08-15 RX ORDER — HYDROCODONE BITARTRATE AND ACETAMINOPHEN 5; 325 MG/1; MG/1
1-2 TABLET ORAL EVERY 4 HOURS PRN
Qty: 10 TABLET | Refills: 0 | Status: SHIPPED | OUTPATIENT
Start: 2018-08-15 | End: 2018-08-25

## 2018-08-15 RX ORDER — HYDROCODONE BITARTRATE AND ACETAMINOPHEN 5; 325 MG/1; MG/1
1-2 TABLET ORAL EVERY 4 HOURS PRN
Qty: 10 TABLET | Refills: 0 | Status: SHIPPED | OUTPATIENT
Start: 2018-08-15 | End: 2018-08-20 | Stop reason: SDUPTHER

## 2018-08-15 RX ORDER — SULFAMETHOXAZOLE AND TRIMETHOPRIM 800; 160 MG/1; MG/1
1 TABLET ORAL 2 TIMES DAILY
Qty: 10 TABLET | Refills: 0 | Status: SHIPPED | OUTPATIENT
Start: 2018-08-15 | End: 2018-08-20 | Stop reason: SDUPTHER

## 2018-08-15 RX ADMIN — CARVEDILOL 12.5 MG: 12.5 TABLET, FILM COATED ORAL at 08:02

## 2018-08-15 RX ADMIN — HEPARIN SODIUM 5000 UNITS: 5000 INJECTION, SOLUTION INTRAVENOUS; SUBCUTANEOUS at 05:01

## 2018-08-15 RX ADMIN — SACUBITRIL AND VALSARTAN 1 TABLET: 24; 26 TABLET, FILM COATED ORAL at 08:01

## 2018-08-15 RX ADMIN — FLUTICASONE FUROATE AND VILANTEROL TRIFENATATE 1 PUFF: 100; 25 POWDER RESPIRATORY (INHALATION) at 08:01

## 2018-08-15 RX ADMIN — CYANOCOBALAMIN TAB 500 MCG 1000 MCG: 500 TAB at 08:02

## 2018-08-15 RX ADMIN — CEFAZOLIN SODIUM 1000 MG: 1 SOLUTION INTRAVENOUS at 08:02

## 2018-08-15 RX ADMIN — LEVALBUTEROL HYDROCHLORIDE 1.25 MG: 1.25 SOLUTION, CONCENTRATE RESPIRATORY (INHALATION) at 01:09

## 2018-08-15 RX ADMIN — LEVALBUTEROL HYDROCHLORIDE 1.25 MG: 1.25 SOLUTION, CONCENTRATE RESPIRATORY (INHALATION) at 08:33

## 2018-08-15 RX ADMIN — IPRATROPIUM BROMIDE 0.5 MG: 0.5 SOLUTION RESPIRATORY (INHALATION) at 08:33

## 2018-08-15 RX ADMIN — CEFAZOLIN SODIUM 1000 MG: 1 SOLUTION INTRAVENOUS at 01:23

## 2018-08-15 RX ADMIN — ALBUTEROL SULFATE 2 PUFF: 90 AEROSOL, METERED RESPIRATORY (INHALATION) at 04:57

## 2018-08-15 RX ADMIN — IPRATROPIUM BROMIDE 0.5 MG: 0.5 SOLUTION RESPIRATORY (INHALATION) at 01:09

## 2018-08-15 RX ADMIN — OXYBUTYNIN CHLORIDE 5 MG: 5 TABLET ORAL at 08:01

## 2018-08-15 NOTE — DISCHARGE INSTRUCTIONS
Patel catheter care as shown  See us in our office on Monday to have the catheter removed  Call for fever greater than 101 5 or shaking chills, severe pain or inability to drain the catheter  Expect to see blood in the urine with some clots  Resume usual diet  may walk shower intake stairs but no heavy lifting    There prescriptions  to  your pharmacy

## 2018-08-15 NOTE — CASE MANAGEMENT
Initial Clinical Review    Age/Sex: 68 y o  male    Surgery Date: 8/14/18     Procedure:   Surgeon(s) and Role:     * Hema Kirk MD - Primary     Preop Diagnosis:  Enlarged prostate with urinary obstruction [N40 1, N13 8]     Post-Op Diagnosis Codes:     * Enlarged prostate with urinary obstruction [N40 1, N13 8]     Procedure(s) (LRB):  CYSTOSCOPY, TRANSURETHRAL RESECTION OF PROSTATE (TURP) (N/A)     Anesthesia: SPINAL     Admission Orders: Date/Time/Statement: [facility] 8/14/18 @ 1707    Vital Signs: /78 (BP Location: Right arm)   Pulse 72   Temp 99 °F (37 2 °C) (Temporal)   Resp 19   Ht 5' 10" (1 778 m)   Wt 68 5 kg (151 lb)   SpO2 96%   BMI 21 67 kg/m²     Diet:        Diet Orders            Start     Ordered    08/14/18 1819  Diet Regular; Regular House; Regular House  Diet effective now     Question Answer Comment   Diet Type Regular    Regular Regular House    Other Restriction(s): Regular House    RD to adjust diet per protocol? No        08/14/18 1818        Mobility: UP AS ELIA     DVT Prophylaxis:   SCDs  Heparin 5000 u sq q 8 hr     Pain Control:   PRN Meds:   acetaminophen    albuterol    albuterol    morphine injection    ondansetron    oxyCODONE-acetaminophen x1    Thank you,  Latosha Chopra  [facility] Utilization Review Department  Phone: [phone]; Fax [phone]  ATTENTION: The Network Utilization Review Department is now centralized for our 9 Facilities  Make a note that we have a new phone and fax numbers for our Department  Please call with any questions or concerns to [phone] and carefully follow the prompts so that you are directed to the right person  All voicemails are confidential  Fax any determinations, approvals, denials, and requests for initial or continue stay review clinical to [phone]   Due to HIGH CALL volume, it would be easier if you could please send faxed requests to expedite your requests and in part, help us provide discharge notifications faster

## 2018-08-15 NOTE — PROGRESS NOTES
Postop day 1  Status post trans urethral resection of prostate  Doing well  Urine is light pink to clear on virtually no CBI  Afebrile vital signs stable  He wants to go home  Will discharge home with catheter and follow up in our office Monday for Patel catheter removal   Prescription sent to his pharmacy

## 2018-08-16 ENCOUNTER — TELEPHONE (OUTPATIENT)
Dept: UROLOGY | Facility: MEDICAL CENTER | Age: 73
End: 2018-08-16

## 2018-08-16 ENCOUNTER — DOCTOR'S OFFICE (OUTPATIENT)
Dept: URBAN - METROPOLITAN AREA CLINIC 137 | Facility: CLINIC | Age: 73
Setting detail: OPHTHALMOLOGY
End: 2018-08-16
Payer: COMMERCIAL

## 2018-08-16 ENCOUNTER — RX ONLY (RX ONLY)
Age: 73
End: 2018-08-16

## 2018-08-16 VITALS — HEIGHT: 60 IN

## 2018-08-16 DIAGNOSIS — H25.13: ICD-10-CM

## 2018-08-16 DIAGNOSIS — H04.123: ICD-10-CM

## 2018-08-16 PROCEDURE — 99204 OFFICE O/P NEW MOD 45 MIN: CPT | Performed by: OPHTHALMOLOGY

## 2018-08-16 ASSESSMENT — REFRACTION_CURRENTRX
OS_OVR_VA: 20/
OS_OVR_VA: 20/
OD_OVR_VA: 20/
OS_OVR_VA: 20/
OD_OVR_VA: 20/
OD_OVR_VA: 20/

## 2018-08-16 ASSESSMENT — REFRACTION_MANIFEST
OS_VA1: 20/
OD_VA1: 20/
OD_VA3: 20/
OD_VA2: 20/
OS_VA1: 20/
OD_VA2: 20/
OS_VA3: 20/
OS_VA3: 20/
OU_VA: 20/
OD_VA2: 20/
OU_VA: 20/
OU_VA: 20/
OS_VA1: 20/
OS_VA2: 20/
OD_VA3: 20/
OS_VA2: 20/
OS_VA3: 20/
OS_VA2: 20/
OD_VA3: 20/

## 2018-08-16 ASSESSMENT — CONFRONTATIONAL VISUAL FIELD TEST (CVF)
OS_FINDINGS: FULL
OD_FINDINGS: FULL

## 2018-08-16 ASSESSMENT — VISUAL ACUITY
OS_BCVA: 20/50-1
OD_BCVA: 20/60-1

## 2018-08-16 NOTE — TELEPHONE ENCOUNTER
----- Message from [facility] sent at 8/15/2018 11:07 AM EDT -----  Please call pt to schedule this appt this afternoon when he is home from the hospital  Thanks   ----- Message -----  From: Leopoldo Pedro MD  Sent: 8/15/2018  11:05 AM  To: Newton Melgar 15 557 Clinical    Please call patient to come in for nurse visit for Patel catheter removal on Monday    Patient has appointment already scheduled for Friday, 8/17

## 2018-08-20 ENCOUNTER — CLINICAL SUPPORT (OUTPATIENT)
Dept: UROLOGY | Facility: MEDICAL CENTER | Age: 73
End: 2018-08-20

## 2018-08-20 ENCOUNTER — APPOINTMENT (OUTPATIENT)
Dept: PULMONOLOGY | Facility: CLINIC | Age: 73
End: 2018-08-20
Payer: COMMERCIAL

## 2018-08-20 VITALS — WEIGHT: 151 LBS | HEIGHT: 70 IN | BODY MASS INDEX: 21.62 KG/M2

## 2018-08-20 DIAGNOSIS — R33.8 BENIGN PROSTATIC HYPERPLASIA WITH URINARY RETENTION: Primary | ICD-10-CM

## 2018-08-20 DIAGNOSIS — N40.1 BENIGN PROSTATIC HYPERPLASIA WITH URINARY RETENTION: Primary | ICD-10-CM

## 2018-08-20 PROCEDURE — 99024 POSTOP FOLLOW-UP VISIT: CPT

## 2018-08-20 NOTE — PROGRESS NOTES
Procedures    Pt presents for Patel removal TURP  Meatus was clean, dry, intact, free of drainage or debris  Urine in bag was clear, yellow  Patel removed without difficulty, pt tolerated well  Pt instructed to RTO if unable to void by 3:00 pm, call for severe burning, heavy bleeding, flu-like symptoms  Avoid caffeine today

## 2018-08-22 ENCOUNTER — APPOINTMENT (OUTPATIENT)
Dept: PULMONOLOGY | Facility: CLINIC | Age: 73
End: 2018-08-22
Payer: COMMERCIAL

## 2018-08-24 ENCOUNTER — APPOINTMENT (OUTPATIENT)
Dept: PULMONOLOGY | Facility: CLINIC | Age: 73
End: 2018-08-24
Payer: COMMERCIAL

## 2018-08-27 ENCOUNTER — CLINICAL SUPPORT (OUTPATIENT)
Dept: PULMONOLOGY | Facility: CLINIC | Age: 73
End: 2018-08-27
Payer: COMMERCIAL

## 2018-08-27 DIAGNOSIS — J44.1 COPD WITH ACUTE EXACERBATION (HCC): ICD-10-CM

## 2018-08-27 PROCEDURE — G0424 PULMONARY REHAB W EXER: HCPCS

## 2018-08-29 ENCOUNTER — CLINICAL SUPPORT (OUTPATIENT)
Dept: PULMONOLOGY | Facility: CLINIC | Age: 73
End: 2018-08-29
Payer: COMMERCIAL

## 2018-08-29 DIAGNOSIS — J44.1 COPD WITH ACUTE EXACERBATION (HCC): ICD-10-CM

## 2018-08-29 PROCEDURE — G0424 PULMONARY REHAB W EXER: HCPCS

## 2018-08-31 NOTE — PROGRESS NOTES
Pulmonary Rehabilitation Plan of Care   60 day      Today's date: 2018   Visits:  Completed 8 sessions  Patient name: Nakita Moscoso      : 1945  Age: 68 y o  MRN: [id number]  Referring Physician: DANIEL Partida  Provider: David Mcfarlane  Clinician: Indu Bravo, RRT    Dx:   Encounter Diagnosis   Name Primary?  COPD with acute exacerbation [facility])      Date of onset: 2018    COMMENTS: Patient has completed 8 sessions of pulmonary rehabilitation  He recently missed several sessions due to a hospital procedure  He walks on the treadmill for 15 minutes at 1 5 mph and cycles the Nustep for 15 minutes at level 5  For upper body exercise he uses the UBE for 10 minutes at level 4 5 and some  free weight exercises with the 5 lb weight  He maintains an O2 saturation > 90%  wearing 4 lpm nasal canula  His RPE is generally 5 - 6    Medication compliance: Yes   Comments:   Fall Risk: Low   Comments:     EXERCISE/ACTIVITY    Cardiopulmonary Goals:   Min: 30-50   HR:    RPE: 5-7 moderate to somewhat hard exercise   O2 sat: >90%    Modalities: Treadmill, UBE and NuStep  Strength training:    Modalities: Lateral raise, Arm curl and ant flexion 15 repetitions    Exercise Progression:      RPE 5 - 7  Home activity: inactive  Goals: home exercise days opposite NH  Education: Benefit of exercise   Plan:home exercise target 30 mins, 2 days opposite NH  Readiness to change: Contemplation    NUTRITION    Weight control:    Starting weight: 147 lb     Diabetes: N/A  Goals:healthy diet , BMI 21 1  Education:hydration  Plan: Education Video- Diet and Nutrition  Readiness to change: Contemplation    PSYCHOSOCIAL    Emotional:  PHQ9 score 4  Social support: good support ,lives alone , daughter nearby  Goals:   Education: Mental Health Education  Plan:   Readiness to change:     OTHER CORE COMPONENTS     Tobacco:   History   Smoking Status    Former Smoker    Packs/day: 1 00    Years: 52 00  Types: Cigarettes    Quit date: 5/1/2018   Smokeless Tobacco    Never Used     Comment: had about 10cigs in April 2018     Oxygen: 3 lpm  Blood pressure: 98/68   Resting:     Exercise:    Goals: consistent exercise >150 mins/wk  Education: Pulmonary Disease, physiology, Exercise, strength, flexibility, Conservation of energy, oxygen, breathing techniques, Mental Health, Diet,nutrition, Medication and Avoiding Infections  Plan: Exercise benefits  Readiness to change: Contemplation

## 2018-09-03 ENCOUNTER — APPOINTMENT (OUTPATIENT)
Dept: PULMONOLOGY | Facility: CLINIC | Age: 73
End: 2018-09-03
Payer: COMMERCIAL

## 2018-09-05 ENCOUNTER — CLINICAL SUPPORT (OUTPATIENT)
Dept: PULMONOLOGY | Facility: CLINIC | Age: 73
End: 2018-09-05
Payer: COMMERCIAL

## 2018-09-05 DIAGNOSIS — J44.1 COPD WITH ACUTE EXACERBATION (HCC): ICD-10-CM

## 2018-09-05 PROCEDURE — G0424 PULMONARY REHAB W EXER: HCPCS

## 2018-09-07 ENCOUNTER — CLINICAL SUPPORT (OUTPATIENT)
Dept: PULMONOLOGY | Facility: CLINIC | Age: 73
End: 2018-09-07
Payer: COMMERCIAL

## 2018-09-07 DIAGNOSIS — J44.1 COPD WITH ACUTE EXACERBATION (HCC): ICD-10-CM

## 2018-09-07 PROCEDURE — G0424 PULMONARY REHAB W EXER: HCPCS

## 2018-09-10 ENCOUNTER — OFFICE VISIT (OUTPATIENT)
Dept: UROLOGY | Facility: MEDICAL CENTER | Age: 73
End: 2018-09-10
Payer: COMMERCIAL

## 2018-09-10 ENCOUNTER — CLINICAL SUPPORT (OUTPATIENT)
Dept: PULMONOLOGY | Facility: CLINIC | Age: 73
End: 2018-09-10
Payer: COMMERCIAL

## 2018-09-10 VITALS
DIASTOLIC BLOOD PRESSURE: 60 MMHG | SYSTOLIC BLOOD PRESSURE: 100 MMHG | HEIGHT: 70 IN | WEIGHT: 156 LBS | BODY MASS INDEX: 22.33 KG/M2

## 2018-09-10 DIAGNOSIS — N40.1 BENIGN PROSTATIC HYPERPLASIA WITH URINARY RETENTION: Primary | ICD-10-CM

## 2018-09-10 DIAGNOSIS — J44.1 COPD WITH ACUTE EXACERBATION (HCC): ICD-10-CM

## 2018-09-10 DIAGNOSIS — R33.8 BENIGN PROSTATIC HYPERPLASIA WITH URINARY RETENTION: Primary | ICD-10-CM

## 2018-09-10 LAB
POST-VOID RESIDUAL VOLUME, ML POC: 23 ML
SL AMB  POCT GLUCOSE, UA: ABNORMAL
SL AMB LEUKOCYTE ESTERASE,UA: ABNORMAL
SL AMB POCT BILIRUBIN,UA: ABNORMAL
SL AMB POCT BLOOD,UA: ABNORMAL
SL AMB POCT CLARITY,UA: CLEAR
SL AMB POCT COLOR,UA: YELLOW
SL AMB POCT KETONES,UA: ABNORMAL
SL AMB POCT NITRITE,UA: ABNORMAL
SL AMB POCT PH,UA: 6
SL AMB POCT SPECIFIC GRAVITY,UA: 1.02
SL AMB POCT URINE PROTEIN: ABNORMAL
SL AMB POCT UROBILINOGEN: 0.2

## 2018-09-10 PROCEDURE — 51798 US URINE CAPACITY MEASURE: CPT | Performed by: UROLOGY

## 2018-09-10 PROCEDURE — 81003 URINALYSIS AUTO W/O SCOPE: CPT | Performed by: UROLOGY

## 2018-09-10 PROCEDURE — 99024 POSTOP FOLLOW-UP VISIT: CPT | Performed by: UROLOGY

## 2018-09-10 PROCEDURE — G0424 PULMONARY REHAB W EXER: HCPCS

## 2018-09-10 NOTE — PROGRESS NOTES
Now three weeks out from TURP  Making good improvement  Symptoms before surgery were mostly overactive bladder, urgency incontinence, voided 8 times at night, and by his count more than once per hour all day long  Making good improvement, less urgency, no incontinence, no burning  No blood the past week or so  It could take 1-3 more months for the urgency the settle down more  Should do well long term  Follow-up one year or sooner if symptoms are bothersome

## 2018-09-10 NOTE — LETTER
September 10, 2018     Mike Kothari, [address] [place] [address]    Patient: Cheri Newman   YOB: 1945   Date of Visit: 9/10/2018       Dear Dr Arnav Alberts:    Thank you for referring Cheri Newman to me for evaluation  Below are my notes for this consultation  If you have questions, please do not hesitate to call me  I look forward to following your patient along with you  Sincerely,        Gillian Gresham MD        CC: No Recipients  Gillian Gresham MD  9/10/2018 12:02 PM  Sign at close encounter  Now three weeks out from TURP  Making good improvement  Symptoms before surgery were mostly overactive bladder, urgency incontinence, voided 8 times at night, and by his count more than once per hour all day long  Making good improvement, less urgency, no incontinence, no burning  No blood the past week or so  It could take 1-3 more months for the urgency the settle down more  Should do well long term  Follow-up one year or sooner if symptoms are bothersome

## 2018-09-12 ENCOUNTER — CLINICAL SUPPORT (OUTPATIENT)
Dept: PULMONOLOGY | Facility: CLINIC | Age: 73
End: 2018-09-12
Payer: COMMERCIAL

## 2018-09-12 DIAGNOSIS — J44.1 COPD WITH ACUTE EXACERBATION (HCC): ICD-10-CM

## 2018-09-12 PROCEDURE — G0424 PULMONARY REHAB W EXER: HCPCS

## 2018-09-17 ENCOUNTER — CLINICAL SUPPORT (OUTPATIENT)
Dept: PULMONOLOGY | Facility: CLINIC | Age: 73
End: 2018-09-17
Payer: COMMERCIAL

## 2018-09-17 DIAGNOSIS — J44.1 COPD WITH ACUTE EXACERBATION (HCC): ICD-10-CM

## 2018-09-17 PROCEDURE — G0424 PULMONARY REHAB W EXER: HCPCS

## 2018-09-19 ENCOUNTER — CLINICAL SUPPORT (OUTPATIENT)
Dept: PULMONOLOGY | Facility: CLINIC | Age: 73
End: 2018-09-19
Payer: COMMERCIAL

## 2018-09-19 DIAGNOSIS — J44.1 COPD WITH ACUTE EXACERBATION (HCC): ICD-10-CM

## 2018-09-19 PROCEDURE — G0424 PULMONARY REHAB W EXER: HCPCS

## 2018-09-24 ENCOUNTER — OFFICE VISIT (OUTPATIENT)
Dept: PULMONOLOGY | Facility: CLINIC | Age: 73
End: 2018-09-24
Payer: COMMERCIAL

## 2018-09-24 ENCOUNTER — DOCUMENTATION (OUTPATIENT)
Dept: PULMONOLOGY | Facility: CLINIC | Age: 73
End: 2018-09-24

## 2018-09-24 ENCOUNTER — CLINICAL SUPPORT (OUTPATIENT)
Dept: PULMONOLOGY | Facility: CLINIC | Age: 73
End: 2018-09-24
Payer: COMMERCIAL

## 2018-09-24 VITALS
SYSTOLIC BLOOD PRESSURE: 114 MMHG | WEIGHT: 150 LBS | OXYGEN SATURATION: 95 % | BODY MASS INDEX: 21.47 KG/M2 | TEMPERATURE: 97.8 F | HEART RATE: 77 BPM | DIASTOLIC BLOOD PRESSURE: 54 MMHG | HEIGHT: 70 IN

## 2018-09-24 DIAGNOSIS — J44.9 STAGE 4 VERY SEVERE COPD BY GOLD CLASSIFICATION (HCC): ICD-10-CM

## 2018-09-24 DIAGNOSIS — J96.12 CHRONIC RESPIRATORY FAILURE WITH HYPOXIA AND HYPERCAPNIA (HCC): Primary | ICD-10-CM

## 2018-09-24 DIAGNOSIS — J96.11 CHRONIC RESPIRATORY FAILURE WITH HYPOXIA AND HYPERCAPNIA (HCC): Primary | ICD-10-CM

## 2018-09-24 DIAGNOSIS — Z87.891 HISTORY OF TOBACCO USE: ICD-10-CM

## 2018-09-24 DIAGNOSIS — Z23 NEED FOR INFLUENZA VACCINATION: ICD-10-CM

## 2018-09-24 PROBLEM — J44.1 COPD WITH ACUTE EXACERBATION (HCC): Status: RESOLVED | Noted: 2018-05-18 | Resolved: 2018-09-24

## 2018-09-24 PROBLEM — D72.829 LEUKOCYTOSIS: Status: RESOLVED | Noted: 2018-05-21 | Resolved: 2018-09-24

## 2018-09-24 PROCEDURE — 99214 OFFICE O/P EST MOD 30 MIN: CPT | Performed by: NURSE PRACTITIONER

## 2018-09-24 PROCEDURE — G0424 PULMONARY REHAB W EXER: HCPCS

## 2018-09-24 PROCEDURE — G0008 ADMIN INFLUENZA VIRUS VAC: HCPCS

## 2018-09-24 PROCEDURE — 90662 IIV NO PRSV INCREASED AG IM: CPT

## 2018-09-24 RX ORDER — IPRATROPIUM BROMIDE AND ALBUTEROL SULFATE 2.5; .5 MG/3ML; MG/3ML
3 SOLUTION RESPIRATORY (INHALATION) 4 TIMES DAILY
Qty: 180 VIAL | Refills: 0
Start: 2018-09-24 | End: 2019-01-25 | Stop reason: SDUPTHER

## 2018-09-24 NOTE — ASSESSMENT & PLAN NOTE
Continue Advair with DuoNeb nebulized 4 times daily and Ventolin as needed  He rarely uses his Ventolin, but sometimes increases his nebulizer to 5 times daily  This is mostly due to nighttime symptoms  He is doing very well with pulmonary rehab and will continue this

## 2018-09-24 NOTE — ASSESSMENT & PLAN NOTE
It has been approximately four and a half months since Mack Frederick completely quit smoking  He is doing well with this  I congratulated him on this  He will continue with complete smoking cessation  His last CT of the chest was in [place] in 2017  He would like to wait until after the holidays to get his yearly lung screening CT of the chest   He is aware that this can be coordinated at his next appointment with Dr Sarita Hoffman

## 2018-09-24 NOTE — PROGRESS NOTES
Pulmonary Follow-Up Note   Grayson Lewis 68 y o  male MRN: [id number]  9/24/2018      Assessment/Plan:    Problem List Items Addressed This Visit        Respiratory    Chronic respiratory failure with hypoxia and hypercapnia ([facility] [address] ) - Primary     Ld Gaston will continue his 3 liters of oxygen around the clock  I offered humidity for his nasal cannula, but he prefers to use a whole room humidifier  Because of his nighttime awakenings, I discussed BiPAP use  He does not recall intolerance in the hospital   His ABG in May did qualify him for BiPAP use  I will repeat an overnight pulse ox on his 3 liters of nasal cannula oxygen and he will get an outpatient ABG  Based on these results, further plans will be delineated regarding change in nasal cannula versus initiation of BiPAP  Relevant Orders    Pulse Oximeter    Blood gas, arterial    Stage 4 very severe COPD by GOLD classification ([facility] [address] )     Continue Advair with DuoNeb nebulized 4 times daily and Ventolin as needed  He rarely uses his Ventolin, but sometimes increases his nebulizer to 5 times daily  This is mostly due to nighttime symptoms  He is doing very well with pulmonary rehab and will continue this  Relevant Medications    ipratropium-albuterol (DUO-NEB) 0 5-2 5 mg/3 mL nebulizer solution    Other Relevant Orders    Pulse Oximeter    Blood gas, arterial       Other    History of tobacco use     It has been approximately four and a half months since Ld Gaston completely quit smoking  He is doing well with this  I congratulated him on this  He will continue with complete smoking cessation  His last CT of the chest was in [place] in 2017  He would like to wait until after the holidays to get his yearly lung screening CT of the chest   He is aware that this can be coordinated at his next appointment with Dr Regina Kelly  Need for influenza vaccination     Influenza vaccine given today    Ld Gaston reports he is up-to-date on his pneumonia vaccines  Relevant Orders    influenza vaccine, [id number], high-dose, PF 0 5 mL, for patients 65 yr+ (FLUZONE HIGH-DOSE)          Return in about 2 months (around 11/24/2018), or if symptoms worsen or fail to improve  All of Lizy Carrion' questions were answered prior to leaving the office today  He will follow-up with Dr Libby Michael in 2-3 months or sooner should the need arise  He is aware to call our office with any further questions or concerns  History of Present Illness   Reason for Visit:   Follow-up  Chief Complaint:   I feel pretty good    HPI: Chilo Barkley is a 68 y o  male who presents to the office today for follow-up of his COPD  Overall, Lizy Carrion has been doing well since his hospitalization in May  He has more energy and is less dyspneic on exertion  He is in outpatient pulmonary rehab and increasing his activity  His weight has remained stable  He continues with 3 liters of oxygen around the clock and uses Advair twice daily with DuoNeb nebulized four to 5 times a day  He increases to 5 times a day when he has nighttime symptoms  He reports that he has awakenings and is more short of breath at night  He denies any cough or sputum production  He denies any fevers, chills, or night sweats  He denies any chest pain or tightness  He gets a yearly flu vaccine in has not gotten it for 2018  He reports he is up-to-date on his pneumonia vaccines  Review of Systems   All other systems reviewed and are negative  A full 12-point review of systems was completed and is negative except for those outlined in the HPI      Historical Information   Past Medical History:   Diagnosis Date    CAD (coronary artery disease)     Cancer ([facility] [address] )     melanoma left arm and left chest    Cataracts, bilateral     COPD (chronic obstructive pulmonary disease) (HCC)     Cough with sputum     "once in awhile"    Dental bridge present     upper    Enlarged prostate     Heart attack ([address] )     Hiatal hernia     History of coronary artery stent placement     History of pneumonia     History of transfusion     "possibly 30 yrs ago or so"    Hyperlipidemia     Hypertension     Oxygen dependent     Pulmonary emphysema (HCC)     Shortness of breath     Urinary frequency     Urinary urgency      Past Surgical History:   Procedure Laterality Date    ANKLE SURGERY Right     BLADDER SURGERY  08/2018    COLONOSCOPY  2008    CORONARY STENT PLACEMENT  2008    HERNIA REPAIR      right inguinal hernia    LASIK Bilateral     AR TRANSURETHRAL ELEC-SURG PROSTATECTOM N/A 8/14/2018    Procedure: CYSTOSCOPY, TRANSURETHRAL RESECTION OF PROSTATE (TURP);   Surgeon: Alissa Erickson MD;  Location: [facility];  Service: Urology    SKIN CANCER EXCISION Left     arm and chest/melanoma    TONSILLECTOMY      WISDOM TOOTH EXTRACTION       Family History   Problem Relation Age of Onset    Heart disease Father     Hypertension Father     Coronary artery disease Father     Hyperlipidemia Father     Heart disease Maternal Aunt     Heart disease Maternal Uncle     Cancer Neg Hx     Diabetes Neg Hx     Stroke Neg Hx     Arthritis Neg Hx     Sudden death Neg Hx         cardiac     Social History   History   Alcohol Use    1 2 oz/week    2 Glasses of wine per week     Comment: 2 glasses of wine per week , heavier drinker years ago     History   Drug Use No     History   Smoking Status    Former Smoker    Packs/day: 1 00    Years: 52 00    Types: Cigarettes    Quit date: 5/1/2018   Smokeless Tobacco    Never Used     Comment: had about 10cigs in April 2018       Meds/Allergies     Current Outpatient Prescriptions:     albuterol (PROVENTIL HFA,VENTOLIN HFA) 90 mcg/act inhaler, Inhale 2 puffs every 4 (four) hours as needed for wheezing or shortness of breath, Disp: 3 Inhaler, Rfl: 2    aspirin (ECOTRIN LOW STRENGTH) 81 mg EC tablet, Take 81 mg by mouth daily, Disp: , Rfl:     carvedilol (COREG) 12 5 mg tablet, Take 12 5 mg by mouth 2 (two) times a day with meals, Disp: , Rfl:     Cholecalciferol (D-400 PO), Take 1 tablet by mouth once a week, Disp: , Rfl:     cyanocobalamin (VITAMIN B-12) 1,000 mcg tablet, Take 1,000 mcg by mouth daily, Disp: , Rfl:     OXYGEN-HELIUM IN, Inhale 3 liters NC continuously, Disp: , Rfl:     rosuvastatin (CRESTOR) 40 MG tablet, Take 1 tablet by mouth daily, Disp: , Rfl:     sacubitril-valsartan (ENTRESTO) 24-26 MG TABS, Take 1 tablet by mouth 2 (two) times a day, Disp: 180 tablet, Rfl: 3    fluticasone-salmeterol (ADVAIR) 250-50 mcg/dose inhaler, Inhale 1 puff every 12 (twelve) hours for 90 days, Disp: 13 Inhaler, Rfl: 2    ipratropium-albuterol (DUO-NEB) 0 5-2 5 mg/3 mL nebulizer solution, Take 1 vial (3 mL total) by nebulization 4 (four) times a day, Disp: 180 vial, Rfl: 0  No Known Allergies    Vitals: Blood pressure 114/54, pulse 77, temperature 97 8 °F (36 6 °C), temperature source Tympanic, height 5' 10" (1 778 m), weight 68 kg (150 lb), SpO2 95 %  Body mass index is 21 52 kg/m²  Oxygen Therapy  SpO2: 95 %  Oxygen Therapy: Supplemental oxygen  O2 Delivery Method: Nasal cannula  O2 Flow Rate (L/min): 3 L/min    Physical Exam:  Physical Exam   Constitutional: He is oriented to person, place, and time  He appears well-developed  He is cooperative  Non-toxic appearance  No distress  HENT:   Head: Normocephalic and atraumatic  Mouth/Throat: No oropharyngeal exudate  Eyes: EOM are normal  No scleral icterus  Neck: Neck supple  No JVD present  No tracheal deviation present  Cardiovascular: Normal rate, regular rhythm, S1 normal and S2 normal   Exam reveals no gallop and no friction rub  No murmur heard  Pulmonary/Chest: Effort normal  No accessory muscle usage or stridor  No tachypnea  No respiratory distress  He has decreased breath sounds  He has no wheezes  He has no rhonchi  He has no rales  He exhibits no tenderness  Abdominal: Soft   Bowel sounds are normal  He exhibits no distension  There is no tenderness  There is no rebound and no guarding  Musculoskeletal: He exhibits no edema or tenderness  Neurological: He is alert and oriented to person, place, and time  He has normal strength  GCS eye subscore is 4  GCS verbal subscore is 5  GCS motor subscore is 6  Skin: Skin is warm and dry  No abrasion, no ecchymosis, no lesion and no rash noted  He is not diaphoretic  No cyanosis or erythema  Nails show no clubbing  Psychiatric: He has a normal mood and affect  His speech is normal and behavior is normal    Vitals reviewed  No new labs or diagnostic studies    DANIEL Ashley  [facility]        Portions of the record may have been created with voice recognition software  Occasional wrong word or "sound a like" substitutions may have occurred due to the inherent limitations of voice recognition software  Read the chart carefully and recognize, using context, where substitutions have occurred or contact the dictating provider

## 2018-09-24 NOTE — ASSESSMENT & PLAN NOTE
Woody Moreland will continue his 3 liters of oxygen around the clock  I offered humidity for his nasal cannula, but he prefers to use a whole room humidifier  Because of his nighttime awakenings, I discussed BiPAP use  He does not recall intolerance in the hospital   His ABG in May did qualify him for BiPAP use  I will repeat an overnight pulse ox on his 3 liters of nasal cannula oxygen and he will get an outpatient ABG  Based on these results, further plans will be delineated regarding change in nasal cannula versus initiation of BiPAP

## 2018-09-25 ENCOUNTER — OFFICE VISIT (OUTPATIENT)
Dept: CARDIOLOGY CLINIC | Facility: CLINIC | Age: 73
End: 2018-09-25
Payer: COMMERCIAL

## 2018-09-25 VITALS
OXYGEN SATURATION: 95 % | WEIGHT: 151.4 LBS | BODY MASS INDEX: 21.67 KG/M2 | DIASTOLIC BLOOD PRESSURE: 60 MMHG | HEIGHT: 70 IN | HEART RATE: 72 BPM | SYSTOLIC BLOOD PRESSURE: 98 MMHG

## 2018-09-25 DIAGNOSIS — I10 ESSENTIAL HYPERTENSION: ICD-10-CM

## 2018-09-25 DIAGNOSIS — E78.5 DYSLIPIDEMIA: ICD-10-CM

## 2018-09-25 DIAGNOSIS — I25.10 CORONARY ARTERY DISEASE WITHOUT ANGINA PECTORIS, UNSPECIFIED VESSEL OR LESION TYPE, UNSPECIFIED WHETHER NATIVE OR TRANSPLANTED HEART: Primary | ICD-10-CM

## 2018-09-25 DIAGNOSIS — I50.42 CHRONIC COMBINED SYSTOLIC AND DIASTOLIC CONGESTIVE HEART FAILURE (HCC): Chronic | ICD-10-CM

## 2018-09-25 DIAGNOSIS — J44.9 STAGE 4 VERY SEVERE COPD BY GOLD CLASSIFICATION (HCC): ICD-10-CM

## 2018-09-25 PROCEDURE — 93000 ELECTROCARDIOGRAM COMPLETE: CPT | Performed by: INTERNAL MEDICINE

## 2018-09-25 PROCEDURE — 99214 OFFICE O/P EST MOD 30 MIN: CPT | Performed by: INTERNAL MEDICINE

## 2018-09-25 NOTE — PROGRESS NOTES
Cardiology   Cecilia Mann 68 y o  male MRN: [id number]        Impression:  1  Ischemic cardiomyopathy - improvement in LV systolic function over past year  2  COPD - on chronic O2   3  Dyslipidemia - on statin      Recommendations:  1  Continue current medications  2  Follow up in 6 months  HPI: Cecilia Mann is a 68y o  year old male with a history of ischemic cardiomyopathy s/p multiple PCI, COPD, who presents for follow up  Recently moved from [place]  Does wear chronic oxygen of 3L, and has dyspnea on exertion  Recently in hospital with COPD exacerbation  An echocardiogram 5/18 demonstrated EF 30-35%, which is improved from 15% in 2017  No chest pain or palpitations  Feels that dyspnea is getting slightly worse  Review of Systems   Constitutional: Negative  HENT: Negative  Eyes: Negative  Respiratory: Positive for shortness of breath  Negative for chest tightness  Cardiovascular: Negative for chest pain, palpitations and leg swelling  Gastrointestinal: Negative  Endocrine: Negative  Genitourinary: Negative  Musculoskeletal: Negative  Skin: Negative  Allergic/Immunologic: Negative  Neurological: Negative  Hematological: Negative  Psychiatric/Behavioral: Negative  All other systems reviewed and are negative          Past Medical History:   Diagnosis Date    CAD (coronary artery disease)     Cancer ([address] )     melanoma left arm and left chest    Cataracts, bilateral     COPD (chronic obstructive pulmonary disease) (HCC)     Cough with sputum     "once in awhile"    Dental bridge present     upper    Enlarged prostate     Heart attack ([address] )     Hiatal hernia     History of coronary artery stent placement     History of pneumonia     History of transfusion     "possibly 30 yrs ago or so"    Hyperlipidemia     Hypertension     Oxygen dependent     Pulmonary emphysema (HCC)     Shortness of breath     Urinary frequency     Urinary urgency      Past Surgical History:   Procedure Laterality Date    ANKLE SURGERY Right     BLADDER SURGERY  08/2018    COLONOSCOPY  2008    CORONARY STENT PLACEMENT  2008    HERNIA REPAIR      right inguinal hernia    LASIK Bilateral     CT TRANSURETHRAL ELEC-SURG PROSTATECTOM N/A 8/14/2018    Procedure: CYSTOSCOPY, TRANSURETHRAL RESECTION OF PROSTATE (TURP);   Surgeon: Isabela Vega MD;  Location: [facility];  Service: Urology    SKIN CANCER EXCISION Left     arm and chest/melanoma    TONSILLECTOMY      WISDOM TOOTH EXTRACTION       History   Alcohol Use    1 2 oz/week    2 Glasses of wine per week     Comment: 2 glasses of wine per week , heavier drinker years ago     History   Drug Use No     History   Smoking Status    Former Smoker    Packs/day: 1 00    Years: 52 00    Types: Cigarettes    Quit date: 5/1/2018   Smokeless Tobacco    Never Used     Comment: had about 10cigs in April 2018     Family History   Problem Relation Age of Onset    Heart disease Father     Hypertension Father     Coronary artery disease Father     Hyperlipidemia Father     Heart disease Maternal Aunt     Heart disease Maternal Uncle     Cancer Neg Hx     Diabetes Neg Hx     Stroke Neg Hx     Arthritis Neg Hx     Sudden death Neg Hx         cardiac       Allergies:  No Known Allergies    Medications:     Current Outpatient Prescriptions:     albuterol (PROVENTIL HFA,VENTOLIN HFA) 90 mcg/act inhaler, Inhale 2 puffs every 4 (four) hours as needed for wheezing or shortness of breath, Disp: 3 Inhaler, Rfl: 2    aspirin (ECOTRIN LOW STRENGTH) 81 mg EC tablet, Take 81 mg by mouth daily, Disp: , Rfl:     carvedilol (COREG) 12 5 mg tablet, Take 12 5 mg by mouth 2 (two) times a day with meals, Disp: , Rfl:     Cholecalciferol (D-400 PO), Take 1 tablet by mouth once a week, Disp: , Rfl:     cyanocobalamin (VITAMIN B-12) 1,000 mcg tablet, Take 1,000 mcg by mouth daily, Disp: , Rfl:     fluticasone-salmeterol (ADVAIR) 250-50 mcg/dose inhaler, Inhale 1 puff every 12 (twelve) hours for 90 days, Disp: 13 Inhaler, Rfl: 2    ipratropium-albuterol (DUO-NEB) 0 5-2 5 mg/3 mL nebulizer solution, Take 1 vial (3 mL total) by nebulization 4 (four) times a day, Disp: 180 vial, Rfl: 0    OXYGEN-HELIUM IN, Inhale 3 liters NC continuously, Disp: , Rfl:     rosuvastatin (CRESTOR) 40 MG tablet, Take 1 tablet by mouth daily, Disp: , Rfl:     sacubitril-valsartan (ENTRESTO) 24-26 MG TABS, Take 1 tablet by mouth 2 (two) times a day, Disp: 180 tablet, Rfl: 3      Wt Readings from Last 3 Encounters:   09/25/18 68 7 kg (151 lb 6 4 oz)   09/24/18 68 kg (150 lb)   09/10/18 70 8 kg (156 lb)     Temp Readings from Last 3 Encounters:   09/24/18 97 8 °F (36 6 °C) (Tympanic)   08/15/18 98 4 °F (36 9 °C) (Temporal)   06/07/18 97 5 °F (36 4 °C) (Tympanic)     BP Readings from Last 3 Encounters:   09/25/18 98/60   09/24/18 114/54   09/10/18 100/60     Pulse Readings from Last 3 Encounters:   09/25/18 72   09/24/18 77   08/15/18 70         Physical Exam   Constitutional: He is oriented to person, place, and time  He appears well-developed  HENT:   Head: Atraumatic  Eyes: EOM are normal    Neck: Normal range of motion  Cardiovascular: Normal rate, regular rhythm and normal heart sounds  Exam reveals no gallop and no friction rub  No murmur heard  Pulmonary/Chest: Effort normal and breath sounds normal  No respiratory distress  He has no wheezes  He has no rales  Abdominal: Soft  Musculoskeletal: Normal range of motion  Neurological: He is alert and oriented to person, place, and time  Skin: Skin is warm and dry  Psychiatric: He has a normal mood and affect           Laboratory Studies:  CMP:  Lab Results   Component Value Date     08/01/2018    K 3 7 08/01/2018     08/01/2018    CO2 34 (H) 08/01/2018    BUN 11 08/01/2018    CREATININE 0 86 08/01/2018    GLUCOSE 156 (H) 05/19/2018    AST 13 08/01/2018    ALT 9 (L) 08/01/2018    EGFR 87 08/01/2018     Cardiac testing:   EKG reviewed personally: NSR 72 AntSept MI

## 2018-09-26 ENCOUNTER — CLINICAL SUPPORT (OUTPATIENT)
Dept: PULMONOLOGY | Facility: CLINIC | Age: 73
End: 2018-09-26
Payer: COMMERCIAL

## 2018-09-26 ENCOUNTER — TELEPHONE (OUTPATIENT)
Dept: PULMONOLOGY | Facility: CLINIC | Age: 73
End: 2018-09-26

## 2018-09-26 DIAGNOSIS — J44.9 STAGE 4 VERY SEVERE COPD BY GOLD CLASSIFICATION (HCC): ICD-10-CM

## 2018-09-26 PROCEDURE — G0424 PULMONARY REHAB W EXER: HCPCS

## 2018-09-26 NOTE — TELEPHONE ENCOUNTER
Spoke with Swati Albert and informed him that, per [facility] office note, the order is for an overnight oximetry  Rep stated will update in their system

## 2018-09-26 NOTE — TELEPHONE ENCOUNTER
John from [facility] called requesting clarification whether it's an Overnight ox pulse or an Pulse ox that was ordered  John can be reached at [phone] with clarification   Thank you

## 2018-09-28 ENCOUNTER — TELEPHONE (OUTPATIENT)
Dept: PULMONOLOGY | Facility: CLINIC | Age: 73
End: 2018-09-28

## 2018-09-28 DIAGNOSIS — J96.11 CHRONIC RESPIRATORY FAILURE WITH HYPOXIA AND HYPERCAPNIA (HCC): ICD-10-CM

## 2018-09-28 DIAGNOSIS — J96.12 CHRONIC RESPIRATORY FAILURE WITH HYPOXIA AND HYPERCAPNIA (HCC): ICD-10-CM

## 2018-09-28 DIAGNOSIS — J44.9 OBSTRUCTIVE CHRONIC BRONCHITIS WITHOUT EXACERBATION (HCC): ICD-10-CM

## 2018-09-28 DIAGNOSIS — J44.9 STAGE 4 VERY SEVERE COPD BY GOLD CLASSIFICATION (HCC): Primary | ICD-10-CM

## 2018-09-28 NOTE — TELEPHONE ENCOUNTER
Patient left message saying he saw Haley Bowden on Monday and a script was faxed to [address] [place] [address] for a mask for oxygen  [address] [place] [address] called him and has questions about the order  Please call Pearl at [phone] to clarify

## 2018-09-28 NOTE — TELEPHONE ENCOUNTER
Spoke with Yue Edwards at [facility], she stated order needs to say overnight with the amount of oxygen  Processed order and faxed to her at [facility]

## 2018-10-01 ENCOUNTER — CLINICAL SUPPORT (OUTPATIENT)
Dept: PULMONOLOGY | Facility: CLINIC | Age: 73
End: 2018-10-01
Payer: COMMERCIAL

## 2018-10-01 DIAGNOSIS — J44.9 STAGE 4 VERY SEVERE COPD BY GOLD CLASSIFICATION (HCC): ICD-10-CM

## 2018-10-01 PROCEDURE — G0424 PULMONARY REHAB W EXER: HCPCS

## 2018-10-03 ENCOUNTER — CLINICAL SUPPORT (OUTPATIENT)
Dept: PULMONOLOGY | Facility: CLINIC | Age: 73
End: 2018-10-03
Payer: COMMERCIAL

## 2018-10-03 DIAGNOSIS — J44.9 STAGE 4 VERY SEVERE COPD BY GOLD CLASSIFICATION (HCC): ICD-10-CM

## 2018-10-03 PROCEDURE — G0424 PULMONARY REHAB W EXER: HCPCS

## 2018-10-04 ENCOUNTER — OFFICE VISIT (OUTPATIENT)
Dept: INTERNAL MEDICINE CLINIC | Facility: CLINIC | Age: 73
End: 2018-10-04

## 2018-10-04 VITALS
RESPIRATION RATE: 20 BRPM | TEMPERATURE: 97.9 F | OXYGEN SATURATION: 95 % | BODY MASS INDEX: 21.47 KG/M2 | HEART RATE: 80 BPM | DIASTOLIC BLOOD PRESSURE: 70 MMHG | HEIGHT: 70 IN | WEIGHT: 150 LBS | SYSTOLIC BLOOD PRESSURE: 118 MMHG

## 2018-10-04 DIAGNOSIS — Z53.8 APPOINTMENT CANCELED BY HOSPITAL: Primary | ICD-10-CM

## 2018-10-04 NOTE — PROGRESS NOTES
Patient was scheduled for pre-op clearance today    Surgery is scheduled for 10/18 & 11/15(which is beyond the 30 day window)    He does not want to come back for a 2nd pre-op and requests that we call his surgeon's office to 'verify' if this is needed    Called surgeon's office and no one available to address question    [address] and spoke with Shelly() who advised pt cannot be see before 10/16 if he only wants to be seen for 1 pre-op visit    This was explained to patient again and he verbalized understanding and today's appt canceled   & re-scheduled his appt

## 2018-10-08 ENCOUNTER — CLINICAL SUPPORT (OUTPATIENT)
Dept: PULMONOLOGY | Facility: CLINIC | Age: 73
End: 2018-10-08
Payer: COMMERCIAL

## 2018-10-08 ENCOUNTER — HOSPITAL ENCOUNTER (OUTPATIENT)
Dept: PULMONOLOGY | Facility: HOSPITAL | Age: 73
Discharge: HOME | End: 2018-10-08
Payer: COMMERCIAL

## 2018-10-08 DIAGNOSIS — J44.9 STAGE 4 VERY SEVERE COPD BY GOLD CLASSIFICATION (HCC): ICD-10-CM

## 2018-10-08 DIAGNOSIS — J96.12 CHRONIC RESPIRATORY FAILURE WITH HYPOXIA AND HYPERCAPNIA (HCC): ICD-10-CM

## 2018-10-08 DIAGNOSIS — J96.11 CHRONIC RESPIRATORY FAILURE WITH HYPOXIA AND HYPERCAPNIA (HCC): ICD-10-CM

## 2018-10-08 LAB
ARTERIAL PATENCY WRIST A: ABNORMAL
BASE EXCESS BLDA CALC-SCNC: 2 MMOL/L (ref -2–3)
CA-I BLD-SCNC: 1.25 MMOL/L (ref 1.12–1.32)
DS:DELIVERY SYSTEM: ABNORMAL
FIO2 GAS DIL.REBREATH: 32 L
GLUCOSE SERPL-MCNC: 119 MG/DL (ref 65–140)
HCO3 BLDA-SCNC: 27.7 MMOL/L (ref 22–28)
HCT VFR BLD CALC: 36 % (ref 36.5–49.3)
HGB BLDA-MCNC: 12.2 G/DL (ref 12–17)
PCO2 BLD: 29 MMOL/L (ref 21–32)
PCO2 BLD: 46.5 MM HG (ref 36–44)
PH BLD: 7.38 [PH] (ref 7.35–7.45)
PO2 BLD: 67 MM HG (ref 75–129)
POTASSIUM BLD-SCNC: 3.8 MMOL/L (ref 3.5–5.3)
SAMPLE SITE: ABNORMAL
SAO2 % BLD FROM PO2: 92 % (ref 95–98)
SODIUM BLD-SCNC: 139 MMOL/L (ref 136–145)
SPECIMEN SOURCE: ABNORMAL

## 2018-10-08 PROCEDURE — 84132 ASSAY OF SERUM POTASSIUM: CPT

## 2018-10-08 PROCEDURE — 82330 ASSAY OF CALCIUM: CPT

## 2018-10-08 PROCEDURE — 85014 HEMATOCRIT: CPT

## 2018-10-08 PROCEDURE — 94760 N-INVAS EAR/PLS OXIMETRY 1: CPT

## 2018-10-08 PROCEDURE — 82947 ASSAY GLUCOSE BLOOD QUANT: CPT

## 2018-10-08 PROCEDURE — 82803 BLOOD GASES ANY COMBINATION: CPT

## 2018-10-08 PROCEDURE — 84295 ASSAY OF SERUM SODIUM: CPT

## 2018-10-08 PROCEDURE — 36600 WITHDRAWAL OF ARTERIAL BLOOD: CPT

## 2018-10-08 PROCEDURE — G0424 PULMONARY REHAB W EXER: HCPCS

## 2018-10-09 NOTE — PROGRESS NOTES
Pulmonary Rehabilitation Plan of Care   90 day       Today's date: 10/9/2018   Visits:  Completed 20 sessions  Patient name: Raymond Horta      : 1945  Age: 68 y o  MRN: [id number]  Referring Physician: DANIEL Wallis  Provider: [address] [place] [address]  Clinician: Bert Alejo RRT    Dx:   Encounter Diagnosis   Name Primary?  Stage 4 very severe COPD by GOLD classification [facility])      Date of onset: 2018    COMMENTS: Patient has completed 20 sessions of pulmonary rehabilitation  He walks on the treadmill for 20 minutes at 1 5 mph and cycles the Nustep for 15 minutes at level 6  For upper body exercise he uses the UBE for 10 minutes at level 6 and some  free weight exercises with the 5 lb weight  He maintains an O2 saturation > 90%  wearing 4 lpm nasal canula  His RPE is generally 5 - 6  He has good attendance      Medication compliance: Yes   Comments:   Fall Risk: Low   Comments:     EXERCISE/ACTIVITY    Cardiopulmonary Goals:   Min: 30-50   HR:    RPE: 5-7 moderate to somewhat hard exercise   O2 sat: >90%    Modalities: Treadmill, UBE and NuStep  Strength training:    Modalities: Lateral raise, Arm curl and ant flexion 15 repetitions, 5 lb weight    Exercise Progression:      RPE 5 - 7  Home activity: inactive  Goals: home exercise days opposite [place]  Education: Benefit of exercise   Plan:home exercise target 30 mins, 2 days opposite [place]  Readiness to change: Contemplation    NUTRITION    Weight control:    Starting weight: 147 lb     Diabetes: N/A  Goals:healthy diet , BMI 21 1  Education:hydration  Plan: Education Video- Diet and Nutrition  Readiness to change: Contemplation    PSYCHOSOCIAL    Emotional:  PHQ9 score 4  Social support: good support ,lives alone , daughter nearby  Goals:   Education: Mental Health Education  Plan:   Readiness to change:     OTHER CORE COMPONENTS     Tobacco:   History   Smoking Status    Former Smoker    Packs/day: 1 00    Years: 52 00    Types: Cigarettes    Quit date: 5/1/2018   Smokeless Tobacco    Never Used     Comment: had about 10cigs in April 2018     Oxygen: 4 lpm  Blood pressure: 98/68   Resting:     Exercise:    Goals: consistent exercise >150 mins/wk  Education: Pulmonary Disease, physiology, Exercise, strength, flexibility, Conservation of energy, oxygen, breathing techniques, Mental Health, Diet,nutrition, Medication and Avoiding Infections  Plan: Exercise benefits  Readiness to change: Contemplation

## 2018-10-10 ENCOUNTER — CLINICAL SUPPORT (OUTPATIENT)
Dept: PULMONOLOGY | Facility: CLINIC | Age: 73
End: 2018-10-10
Payer: COMMERCIAL

## 2018-10-10 DIAGNOSIS — J44.9 CHRONIC OBSTRUCTIVE PULMONARY DISEASE, UNSPECIFIED COPD TYPE (HCC): ICD-10-CM

## 2018-10-10 PROCEDURE — G0424 PULMONARY REHAB W EXER: HCPCS

## 2018-10-15 ENCOUNTER — CLINICAL SUPPORT (OUTPATIENT)
Dept: PULMONOLOGY | Facility: CLINIC | Age: 73
End: 2018-10-15
Payer: COMMERCIAL

## 2018-10-15 DIAGNOSIS — J44.9 STAGE 4 VERY SEVERE COPD BY GOLD CLASSIFICATION (HCC): ICD-10-CM

## 2018-10-15 PROCEDURE — G0424 PULMONARY REHAB W EXER: HCPCS

## 2018-10-16 ENCOUNTER — OFFICE VISIT (OUTPATIENT)
Dept: INTERNAL MEDICINE CLINIC | Facility: CLINIC | Age: 73
End: 2018-10-16
Payer: COMMERCIAL

## 2018-10-16 ENCOUNTER — TRANSCRIBE ORDERS (OUTPATIENT)
Dept: LAB | Facility: CLINIC | Age: 73
End: 2018-10-16

## 2018-10-16 ENCOUNTER — OFFICE VISIT (OUTPATIENT)
Dept: LAB | Facility: CLINIC | Age: 73
End: 2018-10-16
Payer: COMMERCIAL

## 2018-10-16 VITALS
BODY MASS INDEX: 21.33 KG/M2 | SYSTOLIC BLOOD PRESSURE: 104 MMHG | RESPIRATION RATE: 18 BRPM | OXYGEN SATURATION: 98 % | HEART RATE: 54 BPM | HEIGHT: 70 IN | WEIGHT: 149 LBS | DIASTOLIC BLOOD PRESSURE: 62 MMHG | TEMPERATURE: 97.8 F

## 2018-10-16 DIAGNOSIS — Z01.810 PRE-OPERATIVE CARDIOVASCULAR EXAMINATION: ICD-10-CM

## 2018-10-16 DIAGNOSIS — J96.12 CHRONIC RESPIRATORY FAILURE WITH HYPOXIA AND HYPERCAPNIA (HCC): ICD-10-CM

## 2018-10-16 DIAGNOSIS — J96.11 CHRONIC RESPIRATORY FAILURE WITH HYPOXIA AND HYPERCAPNIA (HCC): ICD-10-CM

## 2018-10-16 DIAGNOSIS — Z01.810 PRE-OPERATIVE CARDIOVASCULAR EXAMINATION: Primary | ICD-10-CM

## 2018-10-16 DIAGNOSIS — Z01.818 PRE-OP EXAMINATION: Primary | ICD-10-CM

## 2018-10-16 DIAGNOSIS — Z87.891 HISTORY OF TOBACCO USE: ICD-10-CM

## 2018-10-16 DIAGNOSIS — J44.9 STAGE 4 VERY SEVERE COPD BY GOLD CLASSIFICATION (HCC): ICD-10-CM

## 2018-10-16 DIAGNOSIS — I10 ESSENTIAL HYPERTENSION: ICD-10-CM

## 2018-10-16 DIAGNOSIS — H26.9 CATARACT OF BOTH EYES, UNSPECIFIED CATARACT TYPE: ICD-10-CM

## 2018-10-16 DIAGNOSIS — I25.10 CORONARY ARTERY DISEASE INVOLVING NATIVE CORONARY ARTERY OF NATIVE HEART WITHOUT ANGINA PECTORIS: Chronic | ICD-10-CM

## 2018-10-16 LAB
ATRIAL RATE: 77 BPM
P AXIS: 71 DEGREES
PR INTERVAL: 190 MS
QRS AXIS: 90 DEGREES
QRSD INTERVAL: 100 MS
QT INTERVAL: 370 MS
QTC INTERVAL: 418 MS
T WAVE AXIS: -89 DEGREES
VENTRICULAR RATE: 77 BPM

## 2018-10-16 PROCEDURE — 93010 ELECTROCARDIOGRAM REPORT: CPT | Performed by: INTERNAL MEDICINE

## 2018-10-16 PROCEDURE — 3008F BODY MASS INDEX DOCD: CPT | Performed by: INTERNAL MEDICINE

## 2018-10-16 PROCEDURE — 93005 ELECTROCARDIOGRAM TRACING: CPT

## 2018-10-16 PROCEDURE — 1036F TOBACCO NON-USER: CPT | Performed by: INTERNAL MEDICINE

## 2018-10-16 PROCEDURE — 1160F RVW MEDS BY RX/DR IN RCRD: CPT | Performed by: INTERNAL MEDICINE

## 2018-10-16 PROCEDURE — 99214 OFFICE O/P EST MOD 30 MIN: CPT | Performed by: INTERNAL MEDICINE

## 2018-10-16 NOTE — Clinical Note
Hi Minor Haywood has severe COPD/CHF on supplemental O2  Any cardiologic contraindication to Lottie Piña having b/l cataract surgery?   thx

## 2018-10-16 NOTE — PROGRESS NOTES
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) and cardiomyopathy/CHF  Dr Sohail Mccurdy will discuss care with anesthesia dept who will likely transfer case to inpatient setting rather than outpatient ambulatory surgery center -> for better patient monitoring jerica-operatively  Update: spoke with Rupinder, surgery scheduler at Dr Sohail Mccurdy' office on 10/17  Dr Sohail Mccurdy discussed with anesthesia and Mahendra Azevedo is still on schedule for surgery tomorrow  Subjective:      Patient ID: Sonny Garza is a 68 y o  male  HPI   Pre-Op Evaluation  Sonny Garza is a 68 y o  male who presents to the office today for a preoperative consultation at the request of surgeon Dr Herman Alvarez who plans on performing bilateral cataract surgery on October 18 & November 15  This consultation is requested for the specific conditions prompting preoperative evaluation (i e  because of potential affect on operative risk)  Planned anesthesia is MAC anesthesia(?, not listed on form)  The patient has the following known anesthesia issues: none and no family history of anesthesia  Patient has a bleeding risk of: no recent abnormal bleeding  Here for pre-op evaluation as above  Pt is chronically hypoxic 2nd to severe COPD requiring 3L/min oxygen supplemental at all times & up to 4L/min with ambulation  He denies CP/SOB/WILLIAMSON with rest or exertion    Hx of CHF/cardiomyopathy and takes coreg(alpha and beta blocker) for ongoing treatment  Sees cardiology for ongoing care  He recently had prostate surgery under general anesthesia in 8/2018 and had no jerica-op or post-operative complications per patient  EKG attempted during today's appt but unable to capture  No other concerns or complaints  Past Medical History:   Diagnosis Date    CAD (coronary artery disease)     [facility] [address] )     melanoma left arm and left chest    Cataracts, bilateral     COPD (chronic obstructive pulmonary disease) (HCC)     Cough with sputum     "once in awhile"    Dental bridge present     upper    Enlarged prostate     Heart attack ([facility] )     Hiatal hernia     History of coronary artery stent placement     History of pneumonia     History of transfusion     "possibly 30 yrs ago or so"    Hyperlipidemia     Hypertension     Oxygen dependent     Pulmonary emphysema (HCC)     Shortness of breath     Urinary frequency     Urinary urgency      Vitals:    10/16/18 0820   BP: 104/62   Pulse: (!) 54   Resp: 18   Temp: 97 8 °F (36 6 °C)   TempSrc: Oral   SpO2: 98%   Weight: 67 6 kg (149 lb)   Height: 5' 10" (1 778 m)     Body mass index is 21 38 kg/m²      Current Outpatient Prescriptions:     albuterol (PROVENTIL HFA,VENTOLIN HFA) 90 mcg/act inhaler, Inhale 2 puffs every 4 (four) hours as needed for wheezing or shortness of breath, Disp: 3 Inhaler, Rfl: 2    aspirin (ECOTRIN LOW STRENGTH) 81 mg EC tablet, Take 81 mg by mouth daily, Disp: , Rfl:     carvedilol (COREG) 12 5 mg tablet, Take 12 5 mg by mouth 2 (two) times a day with meals, Disp: , Rfl:     Cholecalciferol (D-400 PO), Take 1 tablet by mouth once a week, Disp: , Rfl:     cyanocobalamin (VITAMIN B-12) 1,000 mcg tablet, Take 1,000 mcg by mouth daily, Disp: , Rfl:     ipratropium-albuterol (DUO-NEB) 0 5-2 5 mg/3 mL nebulizer solution, Take 1 vial (3 mL total) by nebulization 4 (four) times a day, Disp: 180 vial, Rfl: 0    OXYGEN-HELIUM IN, Inhale 3 liters NC continuously, Disp: , Rfl:     rosuvastatin (CRESTOR) 40 MG tablet, Take 1 tablet by mouth daily, Disp: , Rfl:     sacubitril-valsartan (ENTRESTO) 24-26 MG TABS, Take 1 tablet by mouth 2 (two) times a day, Disp: 180 tablet, Rfl: 3    fluticasone-salmeterol (ADVAIR) 250-50 mcg/dose inhaler, Inhale 1 puff every 12 (twelve) hours for 90 days, Disp: 13 Inhaler, Rfl: 2  No Known Allergies      Review of Systems   Constitutional: Negative for fever  HENT: Negative for congestion  Eyes: Negative for visual disturbance  Respiratory: Negative for cough and shortness of breath  Cardiovascular: Negative for chest pain  Gastrointestinal: Negative for abdominal pain  Genitourinary: Negative for dysuria  Musculoskeletal: Negative for arthralgias  Skin: Negative for rash  Neurological: Negative for headaches  Hematological: Does not bruise/bleed easily  Psychiatric/Behavioral: Negative for dysphoric mood  Objective:      /62   Pulse (!) 54   Temp 97 8 °F (36 6 °C) (Oral)   Resp 18   Ht 5' 10" (1 778 m)   Wt 67 6 kg (149 lb)   SpO2 98% Comment: on 3L/min O2  BMI 21 38 kg/m²          Physical Exam   Constitutional: He appears well-developed and well-nourished  Using 3L/min supplement oxygen via NC but in no acute distress   HENT:   Head: Normocephalic and atraumatic  Mouth/Throat: Oropharynx is clear and moist    Eyes: Pupils are equal, round, and reactive to light  Conjunctivae are normal    Cardiovascular: Normal rate, regular rhythm and normal heart sounds  No murmur heard  Pulmonary/Chest: Effort normal and breath sounds normal  He has no wheezes  He has no rales  Abdominal: Soft  Bowel sounds are normal  There is no tenderness  Musculoskeletal: He exhibits no edema  Lymphadenopathy:     He has no cervical adenopathy  Neurological: He is alert  Psychiatric: He has a normal mood and affect  His behavior is normal    Vitals reviewed        EKG: Sinus rhythm with  nonspecific ST and T waves changes but essentially unchanged from previous tracings dated 9/25/2018  Lab Results   Component Value Date    GLUCOSE 119 10/08/2018    CALCIUM 9 1 08/01/2018     08/01/2018    K 3 7 08/01/2018    CO2 34 (H) 08/01/2018     08/01/2018    BUN 11 08/01/2018    CREATININE 0 86 08/01/2018     Reviewed most recent blood work, EKG & Leisa Pepe is a moderate to high risk for low risk surgery  [place] Filler has severe COPD & requires 24 hour oxygen supplementation at 3L/min, so close jerica-operative monitoring of pulmonary status is recommended  Leisa Pepe takes carvedilol for congestive heart failure which 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

## 2018-10-16 NOTE — PATIENT INSTRUCTIONS
1  Talk with Dr Dolores Blount about floppy iris syndrome  2  EKG today  3   I will talk with Dr Dolores Blount about continuous oxygen requirements

## 2018-10-17 ENCOUNTER — CLINICAL SUPPORT (OUTPATIENT)
Dept: PULMONOLOGY | Facility: CLINIC | Age: 73
End: 2018-10-17
Payer: COMMERCIAL

## 2018-10-17 DIAGNOSIS — J44.9 STAGE 4 VERY SEVERE COPD BY GOLD CLASSIFICATION (HCC): ICD-10-CM

## 2018-10-17 PROCEDURE — G0424 PULMONARY REHAB W EXER: HCPCS

## 2018-10-22 ENCOUNTER — CLINICAL SUPPORT (OUTPATIENT)
Dept: PULMONOLOGY | Facility: CLINIC | Age: 73
End: 2018-10-22
Payer: COMMERCIAL

## 2018-10-22 DIAGNOSIS — J44.9 STAGE 4 VERY SEVERE COPD BY GOLD CLASSIFICATION (HCC): ICD-10-CM

## 2018-10-22 PROCEDURE — G0424 PULMONARY REHAB W EXER: HCPCS

## 2018-10-26 NOTE — PROGRESS NOTES
Pulmonary Rehabilitation Plan of Care   discharge      Today's date: 10/26/2018   Visits:  Completed 24 sessions  Patient name: Kaylee Collins      : 1945  Age: 68 y o  MRN: [id number]  Referring Physician: DANIEL Paige  Provider: Haley Murray  Clinician: Tasia North, RRT    Dx:   Encounter Diagnosis   Name Primary?  Stage 4 very severe COPD by GOLD classification [facility])      Date of onset: 2018    COMMENTS: Patient has completed 24 sessions of pulmonary rehabilitation  He walks on the treadmill for 20 minutes at 1 5 mph and 1 0 incline and cycles the Nustep for 15 minutes at level 6  For upper body exercise he uses the UBE for 10 minutes at level 6 and does some  free weight exercises with the 5 lb weight  He maintains an O2 saturation > 90%  wearing 4 lpm nasal canula  His RPE is generally 5 - 6  He had good attendance  He walked 183 ft longer with the six minute walk than at the beginning  He feels he hs gained strength during rehab  He has plans to continue with exercise at the gym in his apartment community      Medication compliance: Yes   Comments:   Fall Risk: Low   Comments:     EXERCISE/ACTIVITY    Cardiopulmonary Goals:   Min: 30-50   HR:    RPE: 5-7 moderate to somewhat hard exercise   O2 sat: >90%    Modalities: Treadmill, UBE and NuStep  Strength training:    Modalities: Lateral raise, Arm curl and ant flexion 15 repetitions, 5 lb weight    Exercise Progression:      RPE 5 - 7  Home activity: inactive  Goals: home exercise days opposite [place]  Education: Benefit of exercise   Plan:home exercise target 30 mins, 2 days opposite [place]  Readiness to change: Contemplation    NUTRITION    Weight control:    Starting weight: 147 lb ending weight 149 lb     Diabetes: N/A  Goals:healthy diet , BMI 21 1  Education:hydration  Plan: Education Video- Diet and Nutrition  Readiness to change: Contemplation    PSYCHOSOCIAL    Emotional:  PHQ9 score 4  Social support: good support ,lives alone , daughter nearby  Goals:   Education: Mental Health Education  Plan:   Readiness to change:     OTHER CORE COMPONENTS     Tobacco:   History   Smoking Status    Former Smoker    Packs/day: 1 00    Years: 52 00    Types: Cigarettes    Quit date: 5/1/2018   Smokeless Tobacco    Never Used     Comment: had about 10cigs in April 2018     Oxygen: 4 lpm  Blood pressure: 98/68   Resting:     Exercise:    Goals: consistent exercise >150 mins/wk  Education: Pulmonary Disease, physiology, Exercise, strength, flexibility, Conservation of energy, oxygen, breathing techniques, Mental Health, Diet,nutrition, Medication and Avoiding Infections  Plan: Exercise benefits  Readiness to change: Contemplation

## 2018-10-29 NOTE — PROGRESS NOTES
Demario Thapa   1945     Risk: {DESC; LOW/MODERATE/HIGH:14822}     Pre Post % Change Goal   Date:       Physical       Sub Max ETT (mets) *** *** *** 10% increase   6MWT (feet) *** Total Distance (feet): 543 feet *** 10% increase   UCSD Dyspnea Score ***   *** 5 pt decrease   Supplemental O2 use (L) ***   ***    [facility] (est peak O2) ***   ***    Peak exercise CR/IA (mets) *** *** *** 40% increase   Emotional       PHQ9 (> 10 refer to MD) *** PHQ-9 Total Score: 1   *** 4 pt decrease   [place] (lower score = improvement)       Total *** Total: 21 *** < 27   Feelings *** Feelings: Not at all *** < 3   Physical Fitness *** Physical Fitness: Light *** < 3   Social Support *** Social Support: Yes, as much as I needed *** < 3   Daily Activities *** Daily Activity: A little bit of difficulty *** < 3   Social Activities *** Social Activities: Moderately *** < 3   Pain *** Pain: None *** < 3   Overall Health *** Overall Health: Good *** < 3   Quality of Life *** Quality of Life: Good & bad parts, about equal *** < 3   Change in Health *** Change in Health: about the same *** < 3   Dietary       Rate your plate ***   *** > 58   Measurements       Weight ***     *** 2 5 - 5%   BMI *** There is no height or weight on file to calculate BMI   *** 19 - 25   Waist Circ   ***   *** < 40 M / < 35 F   % Body fat ***   *** < 25 M / < 33 F   BP left arm               (systolic) *** *** *** < 103   (diastolic) *** *** *** < 90   Smoking #/day  (if applicable) *** *** *** 0   Lipids/Glucose (Date)       Total cholesterol *** *** *** 50 - 200   Triglycerides *** *** *** < 150   HDL *** *** *** 40 - 60   LDL *** *** *** < 100   A1C *** *** *** 4 0 - 5 6%   Fasting BG *** *** ***

## 2018-12-11 RX ORDER — NEOMYCIN SULFATE, POLYMYXIN B SULFATE AND DEXAMETHASONE 3.5; 10000; 1 MG/ML; [USP'U]/ML; MG/ML
SUSPENSION/ DROPS OPHTHALMIC
COMMUNITY
Start: 2018-10-16 | End: 2019-04-04 | Stop reason: ALTCHOICE

## 2018-12-12 ENCOUNTER — OFFICE VISIT (OUTPATIENT)
Dept: PULMONOLOGY | Facility: CLINIC | Age: 73
End: 2018-12-12
Payer: COMMERCIAL

## 2018-12-12 ENCOUNTER — TELEPHONE (OUTPATIENT)
Dept: INTERNAL MEDICINE CLINIC | Facility: CLINIC | Age: 73
End: 2018-12-12

## 2018-12-12 VITALS
OXYGEN SATURATION: 94 % | HEART RATE: 69 BPM | HEIGHT: 71 IN | DIASTOLIC BLOOD PRESSURE: 66 MMHG | BODY MASS INDEX: 21.14 KG/M2 | TEMPERATURE: 97.2 F | WEIGHT: 151 LBS | SYSTOLIC BLOOD PRESSURE: 128 MMHG

## 2018-12-12 DIAGNOSIS — J96.11 CHRONIC RESPIRATORY FAILURE WITH HYPOXIA AND HYPERCAPNIA (HCC): Primary | ICD-10-CM

## 2018-12-12 DIAGNOSIS — J44.9 STAGE 4 VERY SEVERE COPD BY GOLD CLASSIFICATION (HCC): ICD-10-CM

## 2018-12-12 DIAGNOSIS — Z87.891 HISTORY OF TOBACCO USE: ICD-10-CM

## 2018-12-12 DIAGNOSIS — J96.11 CHRONIC RESPIRATORY FAILURE WITH HYPOXIA AND HYPERCAPNIA (HCC): ICD-10-CM

## 2018-12-12 DIAGNOSIS — I50.42 CHRONIC COMBINED SYSTOLIC AND DIASTOLIC CONGESTIVE HEART FAILURE (HCC): Chronic | ICD-10-CM

## 2018-12-12 DIAGNOSIS — J96.12 CHRONIC RESPIRATORY FAILURE WITH HYPOXIA AND HYPERCAPNIA (HCC): ICD-10-CM

## 2018-12-12 DIAGNOSIS — J44.9 STAGE 4 VERY SEVERE COPD BY GOLD CLASSIFICATION (HCC): Primary | ICD-10-CM

## 2018-12-12 DIAGNOSIS — J96.12 CHRONIC RESPIRATORY FAILURE WITH HYPOXIA AND HYPERCAPNIA (HCC): Primary | ICD-10-CM

## 2018-12-12 DIAGNOSIS — H26.9 CATARACT OF BOTH EYES, UNSPECIFIED CATARACT TYPE: ICD-10-CM

## 2018-12-12 PROCEDURE — 99214 OFFICE O/P EST MOD 30 MIN: CPT | Performed by: INTERNAL MEDICINE

## 2018-12-12 RX ORDER — CYCLOPENTOLATE HYDROCHLORIDE 10 MG/ML
SOLUTION/ DROPS OPHTHALMIC
Status: ON HOLD | COMMUNITY
Start: 2018-11-12 | End: 2020-06-18 | Stop reason: ALTCHOICE

## 2018-12-12 NOTE — TELEPHONE ENCOUNTER
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

## 2018-12-12 NOTE — ASSESSMENT & PLAN NOTE
He will use Breo Ellipta 100 mcg daily in place of Advair  He will let me know which inhaler he favors and we will plan to provide a prescription in the new year  He will also continue with DuoNeb 4-5 times per day  We discussed the option of referral to [facility] for evaluation for possible endobronchial valve replacement or lung transplant  He would rather wait until the spring or summer months to make that decision  We can discuss at our next visit

## 2018-12-12 NOTE — ASSESSMENT & PLAN NOTE
The patient admits to smoking a very occasional cigarette    He understands that complete smoking cessation is preferred, especially given the severity of his lung disease

## 2018-12-12 NOTE — TELEPHONE ENCOUNTER
Patient saw Pulmonology today and he also discussed it with them and they said it is fine and his breathing is good    Patient also saw the Eye Dr Everardo Bryant did his cataracts that said his vision is good, which DMV required him to see the Eye Dr

## 2018-12-12 NOTE — ASSESSMENT & PLAN NOTE
He will continue with supplemental oxygen at 3-4 L per minute depending on his activity level  He understands the saturations should remain above 89% at all times  He had an ABG in October which did not show significant enough hypercapnia to warrant BiPAP

## 2018-12-12 NOTE — PROGRESS NOTES
Pulmonary Follow Up Note   Mo Maurer 68 y o  male MRN: [id number]  12/12/2018      Assessment/Plan:     Chronic respiratory failure with hypoxia and hypercapnia ([address] )   He will continue with supplemental oxygen at 3-4 L per minute depending on his activity level  He understands the saturations should remain above 89% at all times  He had an ABG in October which did not show significant enough hypercapnia to warrant BiPAP  Stage 4 very severe COPD by GOLD classification ([address] )    He will use Breo Ellipta 100 mcg daily in place of Advair  He will let me know which inhaler he favors and we will plan to provide a prescription in the new year  He will also continue with DuoNeb 4-5 times per day  We discussed the option of referral to [facility] for evaluation for possible endobronchial valve replacement or lung transplant  He would rather wait until the spring or summer months to make that decision  We can discuss at our next visit  History of tobacco use    The patient admits to smoking a very occasional cigarette  He understands that complete smoking cessation is preferred, especially given the severity of his lung disease      Visit orders:    Diagnoses and all orders for this visit:    Stage 4 very severe COPD by GOLD classification ([address] )  -     Nebulizer    Chronic respiratory failure with hypoxia and hypercapnia ([address] )    History of tobacco use    Other orders  -     neomycin-polymyxin-dexamethasone (MAXITROL) ophthalmic suspension;   -     cyclopentolate (CYCLOGYL) 1 % ophthalmic solution; Return in about 3 months (around 3/12/2019)  History of Present Illness   HPI:  Mo Maurer is a 68 y o  male who  Is here today for follow-up regarding severe COPD and chronic hypoxic and hypercapnic respiratory failure  He suffers from chronic dyspnea on exertion which has been stable  He completed pulmonary rehab and is now working out independently 3 days per week    He has no significant cough, wheeze or sputum production  He is compliant with Advair twice daily  He also has a sample of [employer] which he got from a recent hospitalization and is asking if he can use this in place of the Advair  He also uses nebulizer  4-5 times per day, with DuoNeb  He needs a new nebulizer because his broke  Review of Systems   Constitutional: Negative for chills, fever and unexpected weight change  HENT: Negative for postnasal drip and sore throat  Eyes: Negative for visual disturbance  Respiratory:        As noted in HPI   Cardiovascular: Negative for chest pain  Gastrointestinal: Negative for abdominal pain, diarrhea and vomiting  Genitourinary: Negative for difficulty urinating  Skin: Negative for rash  Neurological: Negative for headaches  Hematological: Negative for adenopathy  Psychiatric/Behavioral: Negative  All other systems reviewed and are negative          Historical Information   Past Medical History:   Diagnosis Date    CAD (coronary artery disease)     Cancer ([facility] )     melanoma left arm and left chest    Cataracts, bilateral     COPD (chronic obstructive pulmonary disease) (HCC)     Cough with sputum     "once in awhile"    Dental bridge present     upper    Enlarged prostate     Heart attack ([facility] )     Hiatal hernia     History of coronary artery stent placement     History of pneumonia     History of transfusion     "possibly 30 yrs ago or so"    Hyperlipidemia     Hypertension     Oxygen dependent     Pulmonary emphysema (HCC)     Shortness of breath     Urinary frequency     Urinary urgency      Past Surgical History:   Procedure Laterality Date    ANKLE SURGERY Right     BLADDER SURGERY  08/2018    COLONOSCOPY  2008    CORONARY STENT PLACEMENT  2008    HERNIA REPAIR      right inguinal hernia    LASIK Bilateral     MI TRANSURETHRAL ELEC-SURG PROSTATECTOM N/A 8/14/2018    Procedure: CYSTOSCOPY, TRANSURETHRAL RESECTION OF PROSTATE (TURP); Surgeon: Fatou Farah MD;  Location: [facility] OR;  Service: Urology    SKIN CANCER EXCISION Left     arm and chest/melanoma    TONSILLECTOMY      WISDOM TOOTH EXTRACTION       Family History   Problem Relation Age of Onset    Heart disease Father     Hypertension Father     Coronary artery disease Father     Hyperlipidemia Father     Heart disease Maternal Aunt     Heart disease Maternal Uncle     Cancer Neg Hx     Diabetes Neg Hx     Stroke Neg Hx     Arthritis Neg Hx     Sudden death Neg Hx         cardiac       History   Smoking Status    Light Tobacco Smoker    Packs/day: 1 00    Years: 52 00    Types: Cigarettes    Last attempt to quit: 5/1/2018   Smokeless Tobacco    Never Used         Meds/Allergies     Current Outpatient Prescriptions:     albuterol (PROVENTIL HFA,VENTOLIN HFA) 90 mcg/act inhaler, Inhale 2 puffs every 4 (four) hours as needed for wheezing or shortness of breath, Disp: 3 Inhaler, Rfl: 2    aspirin (ECOTRIN LOW STRENGTH) 81 mg EC tablet, Take 81 mg by mouth daily, Disp: , Rfl:     carvedilol (COREG) 12 5 mg tablet, Take 12 5 mg by mouth 2 (two) times a day with meals, Disp: , Rfl:     Cholecalciferol (D-400 PO), Take 1 tablet by mouth once a week, Disp: , Rfl:     cyanocobalamin (VITAMIN B-12) 1,000 mcg tablet, Take 1,000 mcg by mouth daily, Disp: , Rfl:     cyclopentolate (CYCLOGYL) 1 % ophthalmic solution, , Disp: , Rfl:     ipratropium-albuterol (DUO-NEB) 0 5-2 5 mg/3 mL nebulizer solution, Take 1 vial (3 mL total) by nebulization 4 (four) times a day, Disp: 180 vial, Rfl: 0    neomycin-polymyxin-dexamethasone (MAXITROL) ophthalmic suspension, , Disp: , Rfl:     OXYGEN-HELIUM IN, Inhale 3 liters NC continuously, Disp: , Rfl:     rosuvastatin (CRESTOR) 40 MG tablet, Take 1 tablet by mouth daily, Disp: , Rfl:     sacubitril-valsartan (ENTRESTO) 24-26 MG TABS, Take 1 tablet by mouth 2 (two) times a day, Disp: 180 tablet, Rfl: 3    fluticasone-salmeterol (ADVAIR) 250-50 mcg/dose inhaler, Inhale 1 puff every 12 (twelve) hours for 90 days, Disp: 13 Inhaler, Rfl: 2  No Known Allergies    Vitals: Blood pressure 128/66, pulse 69, temperature (!) 97 2 °F (36 2 °C), temperature source Tympanic, height 5' 11" (1 803 m), weight 68 5 kg (151 lb), SpO2 94 %  Body mass index is 21 06 kg/m²  Oxygen Therapy  SpO2: 94 %  Oxygen Therapy: Supplemental oxygen  O2 Delivery Method: Nasal cannula  O2 Flow Rate (L/min): 4 L/min    Physical Exam   Physical Exam   Constitutional: He is oriented to person, place, and time  No distress  HENT:   Head: Normocephalic  Mouth/Throat: No oropharyngeal exudate  Eyes: Pupils are equal, round, and reactive to light  No scleral icterus  Neck: Neck supple  No JVD present  Cardiovascular: Normal rate and regular rhythm  Pulmonary/Chest:     Markedly decreased breath sounds throughout, no wheeze, rales or rhonchi   Abdominal: Soft  There is no tenderness  Musculoskeletal: He exhibits no edema  Lymphadenopathy:     He has no cervical adenopathy  Neurological: He is alert and oriented to person, place, and time  Skin: Skin is warm and dry  Psychiatric: He has a normal mood and affect  Labs: I have personally reviewed pertinent lab results  , ABG:   10/18/18 -  PH 7 38, pCO2 46, PO2 67  Lab Results   Component Value Date    WBC 5 60 08/01/2018    HGB 12 2 10/08/2018    HCT 36 (L) 10/08/2018    MCV 97 08/01/2018     08/01/2018     Lab Results   Component Value Date    GLUCOSE 119 10/08/2018    CALCIUM 9 1 08/01/2018    K 3 7 08/01/2018    CO2 29 10/08/2018     08/01/2018    BUN 11 08/01/2018    CREATININE 0 86 08/01/2018     No results found for: IGE  Lab Results   Component Value Date    ALT 9 (L) 08/01/2018    AST 13 08/01/2018    ALKPHOS 92 08/01/2018     Imaging and other studies: I have personally reviewed pertinent reports     and I have personally reviewed pertinent films in PACS  Chest x-ray from 6/4/18 shows bibasilar atelectasis with blunting of costophrenic angles    Pulmonary function testing:  Performed  August 2017   FEV1/FVC ratio 69%   FEV1 19% predicted  FVC 72% predicted

## 2018-12-13 NOTE — TELEPHONE ENCOUNTER
EF 30% alone will not preclude him from driving a vehicle, heck my LVAD patients drive to their appointment

## 2018-12-14 NOTE — TELEPHONE ENCOUNTER
Recommend driving skills assessment exam with [facility]'s driving program    I can refer randy, let me know what he would like to do    Thank you

## 2018-12-14 NOTE — TELEPHONE ENCOUNTER
Patient notified  Patient states he feels ok driving  States he needs the physical form completed so he can get a valid drivers license  Patient states he will try the skills assessment but will this cost anything?

## 2018-12-14 NOTE — TELEPHONE ENCOUNTER
Okay referral entered    Pt should call [facility] PT dept to schedule:    [phone]    There may be a copay    thx

## 2019-01-08 ENCOUNTER — TELEPHONE (OUTPATIENT)
Dept: INTERNAL MEDICINE CLINIC | Facility: CLINIC | Age: 74
End: 2019-01-08

## 2019-01-08 ENCOUNTER — OFFICE VISIT (OUTPATIENT)
Dept: OCCUPATIONAL THERAPY | Facility: CLINIC | Age: 74
End: 2019-01-08
Payer: COMMERCIAL

## 2019-01-08 DIAGNOSIS — J96.12 CHRONIC RESPIRATORY FAILURE WITH HYPOXIA AND HYPERCAPNIA (HCC): Primary | ICD-10-CM

## 2019-01-08 DIAGNOSIS — J96.11 CHRONIC RESPIRATORY FAILURE WITH HYPOXIA AND HYPERCAPNIA (HCC): Primary | ICD-10-CM

## 2019-01-08 PROCEDURE — 97165 OT EVAL LOW COMPLEX 30 MIN: CPT | Performed by: OCCUPATIONAL THERAPIST

## 2019-01-08 PROCEDURE — G8992 OTHER PT/OT  D/C STATUS: HCPCS | Performed by: OCCUPATIONAL THERAPIST

## 2019-01-08 PROCEDURE — 96125 COGNITIVE TEST BY HC PRO: CPT | Performed by: OCCUPATIONAL THERAPIST

## 2019-01-08 PROCEDURE — G8991 OTHER PT/OT GOAL STATUS: HCPCS | Performed by: OCCUPATIONAL THERAPIST

## 2019-01-08 PROCEDURE — G8990 OTHER PT/OT CURRENT STATUS: HCPCS | Performed by: OCCUPATIONAL THERAPIST

## 2019-01-08 NOTE — PROGRESS NOTES
OCCUPATIONAL THERAPY FITNESS TO DRIVE EVALUATION:    01/08/2019  Raymond Horta  1945  [id number]  Emmy Noguerameaghans, DO  No diagnosis found  Subjective Evaluation    Quality of life: good      SUBJECTIVE/PERSONAL GOAL: "I want to drive again"  HISTORY OF PRESENT ILLNESS:     Pt is a 68 y o  male who was referred to Occupational Therapy for FTD Evaluation 2* Chronic Respiratory Failure with hypoxia and hypercapnia  [place] moved to [place] from [place] in April of 2018  Pt with recent hospitalization after move with significant SOB evident  Pt reports attempting to obtain [place]  license when he failed eye evaluation 2* cataracts in B/L eyes  Pt underwent cataract surgery-- R eye performed in October 2018 and L eye in November 2018  Pt with follow up appt with ophthalmologist who cleared Pt  Pt recommended to perform FTD evaluation prior to signing forms from [facility]  Pt reports noticing no difficulties with driving at this time with plans to continue driving in both local and highway environments  Pt diagnosed with COPD approximately 7-8 years ago and Congestive Heart failure approximately 11 years ago  Pt lives in a first floor apartment independently  Pt independent status for all ADLs and IADLs at this time  Pt is a retired Sales and Marketing employee for [employer] [address] [employer] [facility] retired in [address]  Pt continues the role of driving with no difficulties reported       PMH:   Past Medical History:   Diagnosis Date    CAD (coronary artery disease)     Cancer ([address] )     melanoma left arm and left chest    Cataracts, bilateral     COPD (chronic obstructive pulmonary disease) (HCC)     Cough with sputum     "once in awhile"    Dental bridge present     upper    Enlarged prostate     Heart attack ([address] )     Hiatal hernia     History of coronary artery stent placement     History of pneumonia     History of transfusion     "possibly 30 yrs ago or so"    Hyperlipidemia     Hypertension     Oxygen dependent     Pulmonary emphysema (HCC)     Shortness of breath     Urinary frequency     Urinary urgency          Pain Levels:     Restin    With Activity:  0    Objective     Functional Assessment     Comments  See impairment section for further details  IMPAIRMENT SECTION:  Driving History:  Vehicle Type:   X Car,     Truck,     Motorcycle  Visual History:   X Glasses ([place]),     Contacts   X Cataracts,     Glaucoma,     [place],     [facility]  Appointment 2018  Communication Status:   X English,     [place]  Driving History:   X GPS use,     History of getting lost,    Tickets,     DUI  License Status:   Active,     X Inactive  Last Drove: Today  Last Accident:   [id number]  Initial License Date:     Driving Goals:   X Local     X Highway  Car Transfer: Mod I (ambulated with oxygen)    Objective  Functional/Cognitive Impairments:  1  DCAT: (see attached report for further details) Pt scoring an 46% likelihood on failing on road   Recommend On Road Assessment:   X Yes,      No  Recommend to [employer]ty [employer]   Yes,     X No  2  OPTEC vision screen: (see attached)   Contrast sensitivity: WNL    Far acuity: 20/40 B/L    Near acuity: 20/30 B/L   Color perception: Pass   Lateral phoria: Exophoria, diopter 10   Depth perception far: Abnormal   Sign recognition: able to ID 9/12 road signs correctly, able to id 2/3 depth perception tasks   Color recognition: Pass  3  Reaction time trials: see attached  trial 1) 0 626 secs, trial 2) 0 686 secs,  trial 3) 0 623secs, average of 3 trials: 0 645 secs, Falling within the 25th %ile for reaction time  4   Rapid Pace Walk Test:  6 05 seconds (WNL):  Those with greater than 9 seconds had a 3 fold increase risk of being in an at fault auto accident  5   Physical findings:  cervical rotation R 55*, L 59*; UE and LE AROM full with WNL strength   6    Probability of creating a hazardous situation on specialized on-road test: 28%; see attached report for further details  7   Recommendations:  OTR recommending Pt to complete an on-road evaluation through [employer] for further assessment of safe driving abilities in community environment  Referring MD to discuss with Pt  Assessment  Assessment details: See skilled analysis for further details  SKILLED ANALYSIS:  Pt is 68 y o  male referred to Occupational Therapy for Fitness to Drive Evaluation to assess Pts cognitive, visual, and motor abilities to drive safely in community environment  Pt scoring cognitively at a 46% predicted probability of failing a specialized on-road test   This indicates abilities for driving may be affected-- results ranging from 26% to 49% indicates a greater probability of passing an on-road performance evaluation than failing one  Individuals scoring within this range have a higher than average on-road error points and a slightly higher risk for potential errors on on-road performance evaluation  Below average scoring was noted in the following areas: initialization and reactions, abilities to encode, retrieve, and/or respond to stimuli, and impulse control  At this time, a specialized on-road evaluation through [employer] should be considered to further assess safe driving abilities in community environment  MD to discuss with PT         INTERVENTION COMMENTS:  Diagnosis: Chronic respiratory failure with hypoxia and hypercapnia  Precautions: Chronic respiratory failure, COPD, Congestive Heart failure, CAD, SOB  FOTO: not performed for FTD Evals  Insurance: Payor: [employer]  REP / Plan: Gonzalez [facility] REP / Product Type: Medicare O /   1 visit

## 2019-01-08 NOTE — TELEPHONE ENCOUNTER
Driving test results are back    Physical therapy dept recommends [place] schedule an appt for a specialized on-road evaluation with [employer] to see if he is fit to drive    No referral needed, just has to call WARREN    If WARREN need his records, they can contact PT dept or my office    Thank you

## 2019-01-08 NOTE — TELEPHONE ENCOUNTER
PT  SAID HE CALLED [facility] AND THEY TOLD HE HE NEEDS DR GOULD TO SIGN THE FORM SO THAT HE CAN GET THE TEST DONE  HE SAID HE DROPPED THE FORM OFF LAST WEEK

## 2019-01-08 NOTE — LETTER
Dear to whom it may concern,      I have been seeing Dick Torres as his primary care physician for last 7 months  Dick Torres is applying for a [employer]  This letter is to inform you that Dick Torres underwent an in-office driving test with [facility]'s Physical therapy department and is recommended to complete a specialized [employer] on-road evaluation to see if he is fit to drive a motor vehicle      Thank you    Brock Cheek [facility]

## 2019-01-08 NOTE — TELEPHONE ENCOUNTER
Form Darryl Hard dropped off is to clear him to drive a motor vehicle    [facility] is closed now, Will call TERENCE tmrw to see what is needed

## 2019-01-09 NOTE — TELEPHONE ENCOUNTER
Let Dick Torres know, I tried calling NICOLAS as well    I completed his form with a [facility]    He can  at his convenience(he needs to sign the form here, per WARREN)    thx

## 2019-01-09 NOTE — TELEPHONE ENCOUNTER
I called [facility] today and they did not have an answer and gave me another [facility] department to call    My office called the new # and they were closed already (8a-4:15pm)    phone # [phone]

## 2019-01-23 ENCOUNTER — TELEPHONE (OUTPATIENT)
Dept: CARDIOLOGY CLINIC | Facility: CLINIC | Age: 74
End: 2019-01-23

## 2019-01-23 NOTE — TELEPHONE ENCOUNTER
Pt dropped off paperwork for Dr Moser to sign regarding his Drivers License  Gave to Larry Miguel will be here on Monday 1/28

## 2019-01-25 DIAGNOSIS — J44.9 STAGE 4 VERY SEVERE COPD BY GOLD CLASSIFICATION (HCC): ICD-10-CM

## 2019-01-25 RX ORDER — IPRATROPIUM BROMIDE AND ALBUTEROL SULFATE 2.5; .5 MG/3ML; MG/3ML
3 SOLUTION RESPIRATORY (INHALATION)
Qty: 450 VIAL | Refills: 3 | Status: SHIPPED | OUTPATIENT
Start: 2019-01-25 | End: 2020-03-24 | Stop reason: SDUPTHER

## 2019-02-22 ENCOUNTER — TELEPHONE (OUTPATIENT)
Dept: INTERNAL MEDICINE CLINIC | Facility: CLINIC | Age: 74
End: 2019-02-22

## 2019-02-22 NOTE — TELEPHONE ENCOUNTER
Patient is due for follow up appt/AWV  Patient declined appt  States he is following up with specialists and does no feel he needs to come in to our office

## 2019-04-04 ENCOUNTER — OFFICE VISIT (OUTPATIENT)
Dept: PULMONOLOGY | Facility: CLINIC | Age: 74
End: 2019-04-04
Payer: COMMERCIAL

## 2019-04-04 VITALS
WEIGHT: 149.2 LBS | BODY MASS INDEX: 20.89 KG/M2 | OXYGEN SATURATION: 97 % | SYSTOLIC BLOOD PRESSURE: 116 MMHG | TEMPERATURE: 97 F | HEART RATE: 74 BPM | DIASTOLIC BLOOD PRESSURE: 84 MMHG | HEIGHT: 71 IN

## 2019-04-04 DIAGNOSIS — J96.11 CHRONIC RESPIRATORY FAILURE WITH HYPOXIA AND HYPERCAPNIA (HCC): ICD-10-CM

## 2019-04-04 DIAGNOSIS — J96.21 ACUTE ON CHRONIC RESPIRATORY FAILURE WITH HYPOXIA AND HYPERCAPNIA (HCC): ICD-10-CM

## 2019-04-04 DIAGNOSIS — J96.12 CHRONIC RESPIRATORY FAILURE WITH HYPOXIA AND HYPERCAPNIA (HCC): ICD-10-CM

## 2019-04-04 DIAGNOSIS — J96.22 ACUTE ON CHRONIC RESPIRATORY FAILURE WITH HYPOXIA AND HYPERCAPNIA (HCC): ICD-10-CM

## 2019-04-04 DIAGNOSIS — J44.9 STAGE 4 VERY SEVERE COPD BY GOLD CLASSIFICATION (HCC): Primary | ICD-10-CM

## 2019-04-04 DIAGNOSIS — J44.1 COPD WITH ACUTE EXACERBATION (HCC): ICD-10-CM

## 2019-04-04 PROCEDURE — 99213 OFFICE O/P EST LOW 20 MIN: CPT | Performed by: INTERNAL MEDICINE

## 2019-04-04 RX ORDER — IBUPROFEN 200 MG
TABLET ORAL EVERY 6 HOURS PRN
COMMUNITY

## 2019-04-19 ENCOUNTER — HOSPITAL ENCOUNTER (OUTPATIENT)
Dept: PULMONOLOGY | Facility: HOSPITAL | Age: 74
Discharge: HOME | End: 2019-04-19
Attending: INTERNAL MEDICINE
Payer: COMMERCIAL

## 2019-04-19 DIAGNOSIS — J44.9 STAGE 4 VERY SEVERE COPD BY GOLD CLASSIFICATION (HCC): ICD-10-CM

## 2019-04-19 PROCEDURE — 94726 PLETHYSMOGRAPHY LUNG VOLUMES: CPT

## 2019-04-19 PROCEDURE — 94760 N-INVAS EAR/PLS OXIMETRY 1: CPT

## 2019-04-19 PROCEDURE — 94060 EVALUATION OF WHEEZING: CPT | Performed by: INTERNAL MEDICINE

## 2019-04-19 PROCEDURE — 94726 PLETHYSMOGRAPHY LUNG VOLUMES: CPT | Performed by: INTERNAL MEDICINE

## 2019-04-19 PROCEDURE — 94729 DIFFUSING CAPACITY: CPT

## 2019-04-19 PROCEDURE — 94729 DIFFUSING CAPACITY: CPT | Performed by: INTERNAL MEDICINE

## 2019-04-19 PROCEDURE — 94060 EVALUATION OF WHEEZING: CPT

## 2019-04-19 RX ORDER — IPRATROPIUM BROMIDE AND ALBUTEROL SULFATE 2.5; .5 MG/3ML; MG/3ML
3 SOLUTION RESPIRATORY (INHALATION) ONCE
Status: COMPLETED | OUTPATIENT
Start: 2019-04-19 | End: 2019-04-19

## 2019-04-19 RX ADMIN — IPRATROPIUM BROMIDE AND ALBUTEROL SULFATE 3 ML: 2.5; .5 SOLUTION RESPIRATORY (INHALATION) at 07:33

## 2019-04-24 ENCOUNTER — TELEPHONE (OUTPATIENT)
Dept: PULMONOLOGY | Facility: CLINIC | Age: 74
End: 2019-04-24

## 2019-04-24 ENCOUNTER — TELEPHONE (OUTPATIENT)
Dept: CCU | Facility: HOSPITAL | Age: 74
End: 2019-04-24

## 2019-04-24 DIAGNOSIS — J44.9 CHRONIC OBSTRUCTIVE PULMONARY DISEASE, UNSPECIFIED COPD TYPE (HCC): Primary | ICD-10-CM

## 2019-05-09 ENCOUNTER — OFFICE VISIT (OUTPATIENT)
Dept: CARDIOLOGY CLINIC | Facility: CLINIC | Age: 74
End: 2019-05-09
Payer: COMMERCIAL

## 2019-05-09 VITALS
OXYGEN SATURATION: 94 % | BODY MASS INDEX: 21.18 KG/M2 | SYSTOLIC BLOOD PRESSURE: 112 MMHG | WEIGHT: 151.3 LBS | HEIGHT: 71 IN | HEART RATE: 74 BPM | DIASTOLIC BLOOD PRESSURE: 64 MMHG

## 2019-05-09 DIAGNOSIS — I25.10 CORONARY ARTERY DISEASE INVOLVING NATIVE CORONARY ARTERY OF NATIVE HEART WITHOUT ANGINA PECTORIS: Chronic | ICD-10-CM

## 2019-05-09 DIAGNOSIS — I50.42 CHRONIC COMBINED SYSTOLIC AND DIASTOLIC CONGESTIVE HEART FAILURE (HCC): Chronic | ICD-10-CM

## 2019-05-09 DIAGNOSIS — J44.9 STAGE 4 VERY SEVERE COPD BY GOLD CLASSIFICATION (HCC): Primary | ICD-10-CM

## 2019-05-09 DIAGNOSIS — I10 ESSENTIAL HYPERTENSION: ICD-10-CM

## 2019-05-09 PROCEDURE — 99214 OFFICE O/P EST MOD 30 MIN: CPT | Performed by: INTERNAL MEDICINE

## 2019-05-09 RX ORDER — ASPIRIN 81 MG/1
81 TABLET ORAL DAILY
Qty: 90 TABLET | Refills: 3
Start: 2019-05-09

## 2019-06-19 ENCOUNTER — TELEPHONE (OUTPATIENT)
Dept: INTERNAL MEDICINE CLINIC | Facility: CLINIC | Age: 74
End: 2019-06-19

## 2019-06-27 ENCOUNTER — HOSPITAL ENCOUNTER (OUTPATIENT)
Dept: NON INVASIVE DIAGNOSTICS | Facility: CLINIC | Age: 74
Discharge: HOME | End: 2019-06-27
Payer: COMMERCIAL

## 2019-06-27 ENCOUNTER — APPOINTMENT (OUTPATIENT)
Dept: LAB | Facility: CLINIC | Age: 74
End: 2019-06-27
Payer: COMMERCIAL

## 2019-06-27 ENCOUNTER — TRANSCRIBE ORDERS (OUTPATIENT)
Dept: LAB | Facility: CLINIC | Age: 74
End: 2019-06-27

## 2019-06-27 DIAGNOSIS — I25.10 CORONARY ARTERY DISEASE INVOLVING NATIVE CORONARY ARTERY OF NATIVE HEART WITHOUT ANGINA PECTORIS: Chronic | ICD-10-CM

## 2019-06-27 DIAGNOSIS — I50.42 CHRONIC COMBINED SYSTOLIC AND DIASTOLIC CONGESTIVE HEART FAILURE (HCC): Chronic | ICD-10-CM

## 2019-06-27 LAB
ALBUMIN SERPL BCP-MCNC: 3.6 G/DL (ref 3.5–5)
ALP SERPL-CCNC: 96 U/L (ref 46–116)
ALT SERPL W P-5'-P-CCNC: 18 U/L (ref 12–78)
ANION GAP SERPL CALCULATED.3IONS-SCNC: 8 MMOL/L (ref 4–13)
AST SERPL W P-5'-P-CCNC: 14 U/L (ref 5–45)
BILIRUB SERPL-MCNC: 0.9 MG/DL (ref 0.2–1)
BUN SERPL-MCNC: 16 MG/DL (ref 5–25)
CALCIUM SERPL-MCNC: 8.9 MG/DL (ref 8.3–10.1)
CHLORIDE SERPL-SCNC: 102 MMOL/L (ref 100–108)
CHOLEST SERPL-MCNC: 103 MG/DL (ref 50–200)
CO2 SERPL-SCNC: 31 MMOL/L (ref 21–32)
CREAT SERPL-MCNC: 1 MG/DL (ref 0.6–1.3)
GFR SERPL CREATININE-BSD FRML MDRD: 74 ML/MIN/1.73SQ M
GLUCOSE P FAST SERPL-MCNC: 94 MG/DL (ref 65–99)
HDLC SERPL-MCNC: 47 MG/DL (ref 40–60)
LDLC SERPL CALC-MCNC: 42 MG/DL (ref 0–100)
POTASSIUM SERPL-SCNC: 3.9 MMOL/L (ref 3.5–5.3)
PROT SERPL-MCNC: 7.1 G/DL (ref 6.4–8.2)
SODIUM SERPL-SCNC: 141 MMOL/L (ref 136–145)
TRIGL SERPL-MCNC: 68 MG/DL

## 2019-06-27 PROCEDURE — 80061 LIPID PANEL: CPT

## 2019-06-27 PROCEDURE — 36415 COLL VENOUS BLD VENIPUNCTURE: CPT | Performed by: INTERNAL MEDICINE

## 2019-06-27 PROCEDURE — 93306 TTE W/DOPPLER COMPLETE: CPT | Performed by: INTERNAL MEDICINE

## 2019-06-27 PROCEDURE — 93306 TTE W/DOPPLER COMPLETE: CPT

## 2019-06-27 PROCEDURE — 80053 COMPREHEN METABOLIC PANEL: CPT | Performed by: INTERNAL MEDICINE

## 2019-07-01 ENCOUNTER — TELEPHONE (OUTPATIENT)
Dept: PULMONOLOGY | Facility: CLINIC | Age: 74
End: 2019-07-01

## 2019-07-01 ENCOUNTER — TELEPHONE (OUTPATIENT)
Dept: NON INVASIVE DIAGNOSTICS | Facility: HOSPITAL | Age: 74
End: 2019-07-01

## 2019-07-01 DIAGNOSIS — J96.21 ACUTE ON CHRONIC RESPIRATORY FAILURE WITH HYPOXIA AND HYPERCAPNIA (HCC): ICD-10-CM

## 2019-07-01 DIAGNOSIS — J96.22 ACUTE ON CHRONIC RESPIRATORY FAILURE WITH HYPOXIA AND HYPERCAPNIA (HCC): ICD-10-CM

## 2019-07-01 DIAGNOSIS — J44.1 COPD WITH ACUTE EXACERBATION (HCC): ICD-10-CM

## 2019-07-01 DIAGNOSIS — J43.9 PULMONARY EMPHYSEMA, UNSPECIFIED EMPHYSEMA TYPE (HCC): Primary | ICD-10-CM

## 2019-07-01 NOTE — TELEPHONE ENCOUNTER
Patient calling saying he was referred to Darshana Carrasquillo  He says his insurance company needs a copy of the referral  [employer] phone number is [phone]  It is the clinical intake team   He was speaking with a lady named [facility]  Patient also said he had his first appt with Darshana Carrasquillo and they would like him to get a CT here  He needs us to place the order       He also wants to know if he should keep his follow up with you in August?

## 2019-07-02 DIAGNOSIS — J44.1 COPD WITH ACUTE EXACERBATION (HCC): ICD-10-CM

## 2019-07-02 RX ORDER — ALBUTEROL SULFATE 90 UG/1
2 AEROSOL, METERED RESPIRATORY (INHALATION) EVERY 4 HOURS PRN
Qty: 54 G | Refills: 2 | Status: SHIPPED | OUTPATIENT
Start: 2019-07-02 | End: 2020-03-24 | Stop reason: SDUPTHER

## 2019-07-19 ENCOUNTER — HOSPITAL ENCOUNTER (OUTPATIENT)
Dept: CT IMAGING | Facility: HOSPITAL | Age: 74
Discharge: HOME | End: 2019-07-19
Attending: INTERNAL MEDICINE
Payer: COMMERCIAL

## 2019-07-19 DIAGNOSIS — J43.9 PULMONARY EMPHYSEMA, UNSPECIFIED EMPHYSEMA TYPE (HCC): ICD-10-CM

## 2019-07-19 PROCEDURE — 71250 CT THORAX DX C-: CPT

## 2019-07-29 ENCOUNTER — TELEPHONE (OUTPATIENT)
Dept: PULMONOLOGY | Facility: CLINIC | Age: 74
End: 2019-07-29

## 2019-07-29 NOTE — TELEPHONE ENCOUNTER
Stefan Jaramillo got a bill for his PFT can you look and see why he got this?             Phone [phone] Claim # [id number]

## 2019-08-02 NOTE — TELEPHONE ENCOUNTER
Patient calling saying Eulalio Dodson is requesting clinicals for procedure to be approved  Please fax to [phone]

## 2019-08-07 ENCOUNTER — TELEPHONE (OUTPATIENT)
Dept: PULMONOLOGY | Facility: CLINIC | Age: 74
End: 2019-08-07

## 2019-08-07 NOTE — TELEPHONE ENCOUNTER
Patient called asking for [facility]  Would like to go over some insurance issues with you   Patient can be reached at [phone]

## 2019-09-16 ENCOUNTER — OFFICE VISIT (OUTPATIENT)
Dept: UROLOGY | Facility: MEDICAL CENTER | Age: 74
End: 2019-09-16
Payer: COMMERCIAL

## 2019-09-16 VITALS
HEART RATE: 75 BPM | HEIGHT: 71 IN | BODY MASS INDEX: 20.3 KG/M2 | WEIGHT: 145 LBS | DIASTOLIC BLOOD PRESSURE: 60 MMHG | SYSTOLIC BLOOD PRESSURE: 90 MMHG

## 2019-09-16 DIAGNOSIS — N40.1 BENIGN PROSTATIC HYPERPLASIA WITH URINARY RETENTION: Primary | ICD-10-CM

## 2019-09-16 DIAGNOSIS — R33.8 BENIGN PROSTATIC HYPERPLASIA WITH URINARY RETENTION: Primary | ICD-10-CM

## 2019-09-16 LAB
POST-VOID RESIDUAL VOLUME, ML POC: 15 ML
SL AMB  POCT GLUCOSE, UA: ABNORMAL
SL AMB LEUKOCYTE ESTERASE,UA: ABNORMAL
SL AMB POCT BILIRUBIN,UA: ABNORMAL
SL AMB POCT BLOOD,UA: ABNORMAL
SL AMB POCT CLARITY,UA: CLEAR
SL AMB POCT COLOR,UA: YELLOW
SL AMB POCT KETONES,UA: ABNORMAL
SL AMB POCT NITRITE,UA: ABNORMAL
SL AMB POCT PH,UA: 6
SL AMB POCT SPECIFIC GRAVITY,UA: 1.02
SL AMB POCT URINE PROTEIN: ABNORMAL
SL AMB POCT UROBILINOGEN: 1

## 2019-09-16 PROCEDURE — 81003 URINALYSIS AUTO W/O SCOPE: CPT | Performed by: UROLOGY

## 2019-09-16 PROCEDURE — 99213 OFFICE O/P EST LOW 20 MIN: CPT | Performed by: UROLOGY

## 2019-09-16 PROCEDURE — 51798 US URINE CAPACITY MEASURE: CPT | Performed by: UROLOGY

## 2019-09-16 RX ORDER — TIOTROPIUM BROMIDE INHALATION SPRAY 3.12 UG/1
SPRAY, METERED RESPIRATORY (INHALATION)
COMMUNITY
Start: 2019-06-28 | End: 2020-02-19 | Stop reason: ALTCHOICE

## 2019-09-16 RX ORDER — TRIAMCINOLONE ACETONIDE 55 UG/1
SPRAY, METERED NASAL
Status: ON HOLD | COMMUNITY
Start: 2019-08-17 | End: 2020-06-18 | Stop reason: ALTCHOICE

## 2019-09-16 RX ORDER — CARVEDILOL PHOSPHATE 20 MG/1
20 CAPSULE, EXTENDED RELEASE ORAL DAILY
COMMUNITY
End: 2020-04-03 | Stop reason: SDUPTHER

## 2019-09-16 NOTE — PROGRESS NOTES
HISTORY:    Now one year after TURP, small volume resection  Symptoms are much improved since that time, although he says he still has nocturia x2, and sometimes stream is weak  No burning, incontinence, daytime his frequency is much less unless he drinks lots of coffee  ASSESSMENT / PLAN:    Empty well today with PVR only 15 mL  He is in bed for 9 hours at night, so getting up twice is probably not unusual     Follow-up one year, does not need PSA  The following portions of the patient's history were reviewed and updated as appropriate: allergies, current medications, past family history, past medical history, past social history, past surgical history and problem list     Review of Systems   All other systems reviewed and are negative  Objective:     Physical Exam   Constitutional: He appears well-developed and well-nourished     Thin, short of breath   Genitourinary:   Genitourinary Comments: Penis testes normal    Prostate minimally enlarged no nodules         No results found for: PSA]  BUN   Date Value Ref Range Status   06/27/2019 16 5 - 25 mg/dL Final     Creatinine   Date Value Ref Range Status   06/27/2019 1 00 0 60 - 1 30 mg/dL Final     Comment:     Standardized to IDMS reference method     No components found for: CBC      Patient Active Problem List   Diagnosis    Chronic respiratory failure with hypoxia and hypercapnia (HCC)    Coronary artery disease without angina pectoris    History of tobacco use    Chronic combined systolic and diastolic congestive heart failure (HCC)    Moderate protein-calorie malnutrition (HCC)    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 shoulder (HCC)    Frequency of urination    BPH with urinary obstruction    Urge incontinence    Benign prostatic hyperplasia with urinary retention    Stage 4 very severe COPD by GOLD classification ([address] )    Need for influenza vaccination    Essential hypertension    Dyslipidemia    Cataracts, bilateral        Diagnoses and all orders for this visit:    Benign prostatic hyperplasia with urinary retention  -     POCT urine dip auto non-scope  -     POCT Measure PVR    Other orders  -     carvedilol (COREG CR) 20 MG 24 hr capsule; Take 20 mg by mouth daily  -     SPIRIVA RESPIMAT 2 5 MCG/ACT AERS inhaler  -     Discontinue: Cholecalciferol 400 units CAPS; Take by mouth  -     NASACORT ALLERGY 24HR 55 MCG/ACT AERO           Patient ID: Anyi Martinez is a 69 y o  male        Current Outpatient Medications:     albuterol (PROVENTIL HFA,VENTOLIN HFA) 90 mcg/act inhaler, INHALE 2 PUFFS EVERY 4 (FOUR) HOURS AS NEEDED FOR WHEEZING OR SHORTNESS OF BREATH, Disp: 54 g, Rfl: 2    aspirin (ECOTRIN LOW STRENGTH) 81 mg EC tablet, Take 1 tablet (81 mg total) by mouth daily, Disp: 90 tablet, Rfl: 3    carvedilol (COREG) 12 5 mg tablet, Take 12 5 mg by mouth 2 (two) times a day with meals, Disp: , Rfl:     Cholecalciferol (D-400 PO), Take 1 tablet by mouth once a week, Disp: , Rfl:     cyanocobalamin (VITAMIN B-12) 1,000 mcg tablet, Take 1,000 mcg by mouth daily, Disp: , Rfl:     cyclopentolate (CYCLOGYL) 1 % ophthalmic solution, , Disp: , Rfl:     ipratropium-albuterol (DUO-NEB) 0 5-2 5 mg/3 mL nebulizer solution, Take 1 vial (3 mL total) by nebulization 5 (five) times a day, Disp: 450 vial, Rfl: 3    NASACORT ALLERGY 24HR 55 MCG/ACT AERO, , Disp: , Rfl:     OXYGEN-HELIUM IN, Inhale 3 liters NC continuously, Disp: , Rfl:     rosuvastatin (CRESTOR) 40 MG tablet, Take 1 tablet by mouth daily, Disp: , Rfl:     SPIRIVA RESPIMAT 2 5 MCG/ACT AERS inhaler, , Disp: , Rfl:     WIXELA INHUB 250-50 MCG/DOSE inhaler, INHALE 1 PUFF EVERY 12 (TWELVE) HOURS , Disp: 1 Inhaler, Rfl: 5    carvedilol (COREG CR) 20 MG 24 hr capsule, Take 20 mg by mouth daily, Disp: , Rfl:     ibuprofen (MOTRIN) 200 mg tablet, Take by mouth every 6 (six) hours as needed for mild pain, Disp: , Rfl:     sacubitril-valsartan (ENTRESTO) 24-26 MG TABS, Take 1 tablet by mouth 2 (two) times a day, Disp: 180 tablet, Rfl: 3    Past Medical History:   Diagnosis Date    CAD (coronary artery disease)     [address] )     melanoma left arm and left chest    Cataracts, bilateral     COPD (chronic obstructive pulmonary disease) (HCC)     Cough with sputum     "once in awhile"    Dental bridge present     upper    Enlarged prostate     Heart attack ([facility] )     Hiatal hernia     History of coronary artery stent placement     History of pneumonia     History of transfusion     "possibly 30 yrs ago or so"    Hyperlipidemia     Hypertension     Oxygen dependent     Pulmonary emphysema (HCC)     Shortness of breath     Urinary frequency     Urinary urgency        Past Surgical History:   Procedure Laterality Date    ANKLE SURGERY Right     BLADDER SURGERY  08/2018    COLONOSCOPY  2008    CORONARY STENT PLACEMENT  2008    HERNIA REPAIR      right inguinal hernia    LASIK Bilateral     CT TRANSURETHRAL ELEC-SURG PROSTATECTOM N/A 8/14/2018    Procedure: CYSTOSCOPY, TRANSURETHRAL RESECTION OF PROSTATE (TURP);   Surgeon: Frank Rodriguez MD;  Location: [facility];  Service: Urology    SKIN CANCER EXCISION Left     arm and chest/melanoma    TONSILLECTOMY      WISDOM TOOTH EXTRACTION         Social History

## 2019-09-23 DIAGNOSIS — E78.5 HYPERLIPIDEMIA, UNSPECIFIED HYPERLIPIDEMIA TYPE: ICD-10-CM

## 2019-09-23 DIAGNOSIS — I10 ESSENTIAL HYPERTENSION: Primary | ICD-10-CM

## 2019-09-23 RX ORDER — CARVEDILOL 12.5 MG/1
12.5 TABLET ORAL 2 TIMES DAILY WITH MEALS
Qty: 180 TABLET | Refills: 2 | Status: SHIPPED | OUTPATIENT
Start: 2019-09-23 | End: 2019-09-26

## 2019-09-23 RX ORDER — ROSUVASTATIN CALCIUM 40 MG/1
40 TABLET, COATED ORAL DAILY
Qty: 90 TABLET | Refills: 2 | Status: SHIPPED | OUTPATIENT
Start: 2019-09-23 | End: 2020-04-03

## 2019-09-26 ENCOUNTER — OFFICE VISIT (OUTPATIENT)
Dept: CARDIOLOGY CLINIC | Facility: CLINIC | Age: 74
End: 2019-09-26
Payer: COMMERCIAL

## 2019-09-26 VITALS
DIASTOLIC BLOOD PRESSURE: 62 MMHG | HEART RATE: 83 BPM | SYSTOLIC BLOOD PRESSURE: 96 MMHG | OXYGEN SATURATION: 90 % | BODY MASS INDEX: 20.62 KG/M2 | WEIGHT: 144 LBS | HEIGHT: 70 IN

## 2019-09-26 DIAGNOSIS — J44.9 STAGE 4 VERY SEVERE COPD BY GOLD CLASSIFICATION (HCC): ICD-10-CM

## 2019-09-26 DIAGNOSIS — I25.10 CORONARY ARTERY DISEASE INVOLVING NATIVE CORONARY ARTERY OF NATIVE HEART WITHOUT ANGINA PECTORIS: Chronic | ICD-10-CM

## 2019-09-26 DIAGNOSIS — E78.5 DYSLIPIDEMIA: ICD-10-CM

## 2019-09-26 DIAGNOSIS — I10 ESSENTIAL HYPERTENSION: Primary | ICD-10-CM

## 2019-09-26 DIAGNOSIS — I50.42 CHRONIC COMBINED SYSTOLIC AND DIASTOLIC CONGESTIVE HEART FAILURE (HCC): Chronic | ICD-10-CM

## 2019-09-26 PROCEDURE — 99214 OFFICE O/P EST MOD 30 MIN: CPT | Performed by: INTERNAL MEDICINE

## 2019-09-26 PROCEDURE — 3078F DIAST BP <80 MM HG: CPT | Performed by: INTERNAL MEDICINE

## 2019-09-26 PROCEDURE — 3074F SYST BP LT 130 MM HG: CPT | Performed by: INTERNAL MEDICINE

## 2019-09-26 NOTE — PROGRESS NOTES
Cardiology   Chad Montana 69 y o  male MRN: [id number]        Impression:  1  Ischemic cardiomyopathy - EF 20% by echo  2  COPD - on chronic O2  Is being evaluated at [facility] for possible lung transplant  3  Dyslipidemia - on statin      Recommendations:  1  Continue current medications  2  Follow up in 6 months  HPI: Chad Montana is a 69y o  year old male with a history of ischemic cardiomyopathy s/p multiple PCI, COPD, who presents for follow up   Recently moved from [facility]   Does wear chronic oxygen of 3L, and has dyspnea on exertion   An echocardiogram 6/19 demonstrated EF 20%, which is essentially unchanged from 2017   No chest pain or palpitations    Feels like breathing is stable  Review of Systems   Constitutional: Negative  HENT: Negative  Eyes: Negative  Respiratory: Negative for chest tightness and shortness of breath  Cardiovascular: Negative for chest pain, palpitations and leg swelling  Gastrointestinal: Negative  Endocrine: Negative  Genitourinary: Negative  Musculoskeletal: Negative  Skin: Negative  Allergic/Immunologic: Negative  Neurological: Negative  Hematological: Negative  Psychiatric/Behavioral: Negative  All other systems reviewed and are negative          Past Medical History:   Diagnosis Date    CAD (coronary artery disease)     Cancer ([address] )     melanoma left arm and left chest    Cataracts, bilateral     COPD (chronic obstructive pulmonary disease) (HCC)     Cough with sputum     "once in awhile"    Dental bridge present     upper    Enlarged prostate     Heart attack ([address] )     Hiatal hernia     History of coronary artery stent placement     History of pneumonia     History of transfusion     "possibly 30 yrs ago or so"    Hyperlipidemia     Hypertension     Oxygen dependent     Pulmonary emphysema (HCC)     Shortness of breath     Urinary frequency     Urinary urgency      Past Surgical History: Procedure Laterality Date    ANKLE SURGERY Right     BLADDER SURGERY  2018    COLONOSCOPY      CORONARY STENT PLACEMENT      HERNIA REPAIR      right inguinal hernia    LASIK Bilateral     TX TRANSURETHRAL ELEC-SURG PROSTATECTOM N/A 2018    Procedure: CYSTOSCOPY, TRANSURETHRAL RESECTION OF PROSTATE (TURP);   Surgeon: Tigist Bradshaw MD;  Location: [facility];  Service: Urology    SKIN CANCER EXCISION Left     arm and chest/melanoma    TONSILLECTOMY      WISDOM TOOTH EXTRACTION       Social History     Substance and Sexual Activity   Alcohol Use Yes    Alcohol/week: 2 0 standard drinks    Types: 2 Glasses of wine per week    Comment: 2 glasses of wine per week , heavier drinker years ago     Social History     Substance and Sexual Activity   Drug Use No     Social History     Tobacco Use   Smoking Status Former Smoker    Packs/day: 1     Years: 52 00    Pack years: 52 00    Types: Cigarettes    Last attempt to quit: 2018    Years since quittin 4   Smokeless Tobacco Never Used     Family History   Problem Relation Age of Onset    Heart disease Father     Hypertension Father     Coronary artery disease Father     Hyperlipidemia Father     Heart disease Maternal Aunt     Heart disease Maternal Uncle     Cancer Neg Hx     Diabetes Neg Hx     Stroke Neg Hx     Arthritis Neg Hx     Sudden death Neg Hx         cardiac       Allergies:  No Known Allergies    Medications:     Current Outpatient Medications:     albuterol (PROVENTIL HFA,VENTOLIN HFA) 90 mcg/act inhaler, INHALE 2 PUFFS EVERY 4 (FOUR) HOURS AS NEEDED FOR WHEEZING OR SHORTNESS OF BREATH, Disp: 54 g, Rfl: 2    aspirin (ECOTRIN LOW STRENGTH) 81 mg EC tablet, Take 1 tablet (81 mg total) by mouth daily, Disp: 90 tablet, Rfl: 3    carvedilol (COREG) 12 5 mg tablet, Take 1 tablet (12 5 mg total) by mouth 2 (two) times a day with meals, Disp: 180 tablet, Rfl: 2    Cholecalciferol (D-400 PO), Take 1 tablet by mouth once a week, Disp: , Rfl:     cyanocobalamin (VITAMIN B-12) 1,000 mcg tablet, Take 1,000 mcg by mouth daily, Disp: , Rfl:     ibuprofen (MOTRIN) 200 mg tablet, Take by mouth every 6 (six) hours as needed for mild pain, Disp: , Rfl:     ipratropium-albuterol (DUO-NEB) 0 5-2 5 mg/3 mL nebulizer solution, Take 1 vial (3 mL total) by nebulization 5 (five) times a day, Disp: 450 vial, Rfl: 3    NASACORT ALLERGY 24HR 55 MCG/ACT AERO, , Disp: , Rfl:     OXYGEN-HELIUM IN, Inhale 3 liters NC continuously, Disp: , Rfl:     rosuvastatin (CRESTOR) 40 MG tablet, Take 1 tablet (40 mg total) by mouth daily, Disp: 90 tablet, Rfl: 2    sacubitril-valsartan (ENTRESTO) 24-26 MG TABS, Take 1 tablet by mouth 2 (two) times a day, Disp: 180 tablet, Rfl: 3    SPIRIVA RESPIMAT 2 5 MCG/ACT AERS inhaler, , Disp: , Rfl:     WIXELA INHUB 250-50 MCG/DOSE inhaler, INHALE 1 PUFF EVERY 12 (TWELVE) HOURS , Disp: 1 Inhaler, Rfl: 5    carvedilol (COREG CR) 20 MG 24 hr capsule, Take 20 mg by mouth daily, Disp: , Rfl:     cyclopentolate (CYCLOGYL) 1 % ophthalmic solution, , Disp: , Rfl:       Wt Readings from Last 3 Encounters:   09/26/19 65 3 kg (144 lb)   09/16/19 65 8 kg (145 lb)   05/09/19 68 6 kg (151 lb 4 8 oz)     Temp Readings from Last 3 Encounters:   04/04/19 (!) 97 °F (36 1 °C) (Tympanic)   12/12/18 (!) 97 2 °F (36 2 °C) (Tympanic)   10/16/18 97 8 °F (36 6 °C) (Oral)     BP Readings from Last 3 Encounters:   09/26/19 96/62   09/16/19 90/60   05/09/19 112/64     Pulse Readings from Last 3 Encounters:   09/26/19 83   09/16/19 75   05/09/19 74         Physical Exam   Constitutional: He is oriented to person, place, and time  He appears well-developed  HENT:   Head: Atraumatic  Eyes: EOM are normal    Neck: Normal range of motion  Cardiovascular: Normal rate, regular rhythm and normal heart sounds  Exam reveals no friction rub  No murmur heard    Pulmonary/Chest: Effort normal and breath sounds normal  No respiratory distress  Abdominal: Soft  Musculoskeletal: Normal range of motion  Neurological: He is alert and oriented to person, place, and time  Skin: Skin is warm and dry  Psychiatric: He has a normal mood and affect  Laboratory Studies:  CMP:  Lab Results   Component Value Date    K 3 9 2019     2019    CO2 31 2019    BUN 16 2019    CREATININE 1 00 2019    GLUCOSE 119 10/08/2018    AST 14 2019    ALT 18 2019    EGFR 74 2019       Lipid Profile:   No results found for: CHOL  Lab Results   Component Value Date    HDL 47 2019     Lab Results   Component Value Date    LDLCALC 42 2019     Lab Results   Component Value Date    TRIG 68 2019       Cardiac testing:     Results for orders placed during the hospital encounter of 19   Echo complete with contrast if indicated    Narrative [place] [address], [address] [facility]  [phone]    Transthoracic Echocardiogram  2D, M-mode, Doppler, and Color Doppler    Study date:  2019    Patient: Leonardo Balderas  MR number: [id number]  Account number: [de-identified]  : 1945  Age: 69 years  Gender: Male  Status: Outpatient  [facility] [address] [facility]  Height: 71 in  Weight: 150 7 lb  BP: 112/ 64 mmHg    Indications: Assess left ventricular function  Diagnoses: I42 9 - Cardiomyopathy, unspecified    Sonographer:  CARA Jalloh  Primary Physician:  Giorgi Dobbs DO  Referring Physician:  Akira Davis MD  Group:  [id number]  Interpreting Physician:  Akira Davis MD    SUMMARY    LEFT VENTRICLE:  Size was normal   Systolic function was severely reduced  Ejection fraction was estimated to be 20 %  There was severe diffuse hypokinesis with akinesis of the mid to distal anterior, anteroseptal, anterolateral, distal inferior, and apical walls    Wall thickness was normal   Doppler parameters were consistent with abnormal left ventricular relaxation (grade 1 diastolic dysfunction)  RIGHT VENTRICLE:  The size was normal   Systolic function was normal     MITRAL VALVE:  There was trace regurgitation  TRICUSPID VALVE:  There was mild regurgitation  COMPARISONS:  Comparison was made with the previous study of 20-May-2018  Ejection fraction has decreased  HISTORY: PRIOR HISTORY: Cardiomyopathy, CAD s/p MI and stent, Hypertension, Hyperlipidemia, COPD    PROCEDURE: The study was performed in the [facility]  This was a routine study  The transthoracic approach was used  The study included complete 2D imaging, M-mode, complete spectral Doppler, and color Doppler  The  heart rate was 67 bpm, at the start of the study  Images were obtained from the parasternal, apical, subcostal, and suprasternal notch acoustic windows  Image quality was adequate  LEFT VENTRICLE: Size was normal  Systolic function was severely reduced  Ejection fraction was estimated to be 20 %  There was severe diffuse hypokinesis with akinesis of the mid to distal anterior, anteroseptal, anterolateral, distal  inferior, and apical walls  Wall thickness was normal  DOPPLER: Doppler parameters were consistent with abnormal left ventricular relaxation (grade 1 diastolic dysfunction)  RIGHT VENTRICLE: The size was normal  Systolic function was normal  Wall thickness was normal     LEFT ATRIUM: Size was normal     RIGHT ATRIUM: Size was normal     MITRAL VALVE: Valve structure was normal  There was normal leaflet separation  DOPPLER: The transmitral velocity was within the normal range  There was no evidence for stenosis  There was trace regurgitation  AORTIC VALVE: The valve was trileaflet  Leaflets exhibited normal thickness and normal cuspal separation  DOPPLER: Transaortic velocity was within the normal range  There was no evidence for stenosis  There was no regurgitation      TRICUSPID VALVE: The valve structure was normal  There was normal leaflet separation  DOPPLER: The transtricuspid velocity was within the normal range  There was no evidence for stenosis  There was mild regurgitation  The tricuspid jet  envelope definition was inadequate for estimation of RV systolic pressure  There are no indirect findings suggestive of moderate or severe pulmonary hypertension  PULMONIC VALVE: Leaflets exhibited normal thickness, no calcification, and normal cuspal separation  DOPPLER: The transpulmonic velocity was within the normal range  There was no regurgitation  PERICARDIUM: There was no pericardial effusion  The pericardium was normal in appearance  AORTA: The root exhibited normal size      SYSTEM MEASUREMENT TABLES    2D  %FS: 10 16 %  AV Diam: 3 13 cm  EDV(Teich): 175 86 ml  EF Biplane: 27 91 %  EF(Cube): 27 48 %  EF(Teich): 21 82 %  ESV(Cube): 151 93 ml  ESV(Teich): 137 48 ml  IVSd: 0 6 cm  LA Area: 14 74 cm2  LA Diam: 3 73 cm  LVEDV MOD A2C: 187 42 ml  LVEDV MOD A4C: 181 57 ml  LVEDV MOD BP: 191 95 ml  LVEF MOD A2C: 22 72 %  LVEF MOD A4C: 25 16 %  LVESV MOD A2C: 144 84 ml  LVESV MOD A4C: 135 88 ml  LVESV MOD BP: 138 37 ml  LVIDd: 5 94 cm  LVIDs: 5 34 cm  LVLd A2C: 10 23 cm  LVLd A4C: 9 93 cm  LVLs A2C: 9 79 cm  LVLs A4C: 9 74 cm  LVPWd: 0 72 cm  RA Area: 12 54 cm2  RV Diam: 3 64 cm  SI(Cube): 30 78 ml/m2  SI(Teich): 20 52 ml/m2  SV MOD A2C: 42 58 ml  SV MOD A4C: 45 69 ml  SV(Cube): 57 56 ml  SV(Teich): 38 38 ml    CW  TR MaxP 47 mmHg  TR Vmax: 2 89 m/s    MM  TAPSE: 2 09 cm    PW  AVC: 334 33 ms  E': 0 05 m/s  E/E': 11 68  MV A Bogdan: 0 63 m/s  MV Dec [place]: 3 42 m/s2  MV DecT: 158 63 ms  MV E Bogdan: 0 54 m/s  MV E/A Ratio: 0 86    [employer] Accredited Echocardiography [facility]    Prepared and electronically signed by    Isaak Calles MD  Signed 2019 16:22:48

## 2019-11-18 DIAGNOSIS — I50.42 CHRONIC COMBINED SYSTOLIC AND DIASTOLIC CONGESTIVE HEART FAILURE (HCC): Chronic | ICD-10-CM

## 2020-01-22 ENCOUNTER — TELEPHONE (OUTPATIENT)
Dept: INTERNAL MEDICINE CLINIC | Facility: CLINIC | Age: 75
End: 2020-01-22

## 2020-01-22 NOTE — TELEPHONE ENCOUNTER
Is Larry Zelaya still a patient of our practice?   He hasn't been seen in over 1 year    I moved my practice location to [address]    If new location is not convenient for him, please wish him well for me    Thank you

## 2020-01-22 NOTE — TELEPHONE ENCOUNTER
LEFT MESSAGE FOR PT ASKING HIM TO CALL BACK SO WE CAN GET HIM SCHEDULED OR TO LET US KNOW THAT HE'S UNABLE TO COME TO THE NEW LOCATION

## 2020-01-27 ENCOUNTER — OFFICE VISIT (OUTPATIENT)
Dept: INTERNAL MEDICINE CLINIC | Facility: CLINIC | Age: 75
End: 2020-01-27
Payer: COMMERCIAL

## 2020-01-27 VITALS
SYSTOLIC BLOOD PRESSURE: 94 MMHG | TEMPERATURE: 98.3 F | BODY MASS INDEX: 20.64 KG/M2 | OXYGEN SATURATION: 96 % | DIASTOLIC BLOOD PRESSURE: 60 MMHG | HEART RATE: 67 BPM | WEIGHT: 144.2 LBS | HEIGHT: 70 IN

## 2020-01-27 DIAGNOSIS — Z00.00 MEDICARE ANNUAL WELLNESS VISIT, SUBSEQUENT: ICD-10-CM

## 2020-01-27 DIAGNOSIS — J44.9 STAGE 4 VERY SEVERE COPD BY GOLD CLASSIFICATION (HCC): ICD-10-CM

## 2020-01-27 DIAGNOSIS — I25.10 CORONARY ARTERY DISEASE INVOLVING NATIVE CORONARY ARTERY OF NATIVE HEART WITHOUT ANGINA PECTORIS: Chronic | ICD-10-CM

## 2020-01-27 DIAGNOSIS — Z11.59 NEED FOR HEPATITIS C SCREENING TEST: ICD-10-CM

## 2020-01-27 DIAGNOSIS — I50.42 CHRONIC COMBINED SYSTOLIC AND DIASTOLIC CONGESTIVE HEART FAILURE (HCC): Chronic | ICD-10-CM

## 2020-01-27 DIAGNOSIS — I10 ESSENTIAL HYPERTENSION: ICD-10-CM

## 2020-01-27 DIAGNOSIS — Z99.81 SUPPLEMENTAL OXYGEN DEPENDENT: ICD-10-CM

## 2020-01-27 DIAGNOSIS — J31.0 RHINITIS MEDICAMENTOSA: Primary | ICD-10-CM

## 2020-01-27 DIAGNOSIS — L85.3 DRY SKIN: ICD-10-CM

## 2020-01-27 DIAGNOSIS — T14.90XD HEALING WOUND: ICD-10-CM

## 2020-01-27 DIAGNOSIS — E78.2 MIXED HYPERLIPIDEMIA: ICD-10-CM

## 2020-01-27 DIAGNOSIS — C43.61 MALIGNANT MELANOMA OF RIGHT UPPER EXTREMITY INCLUDING SHOULDER (HCC): ICD-10-CM

## 2020-01-27 DIAGNOSIS — T48.5X5A RHINITIS MEDICAMENTOSA: Primary | ICD-10-CM

## 2020-01-27 DIAGNOSIS — Z12.11 SCREEN FOR COLON CANCER: ICD-10-CM

## 2020-01-27 PROBLEM — H26.9 CATARACTS, BILATERAL: Status: RESOLVED | Noted: 2018-10-16 | Resolved: 2020-01-27

## 2020-01-27 PROBLEM — E44.0 MODERATE PROTEIN-CALORIE MALNUTRITION (HCC): Status: RESOLVED | Noted: 2018-05-21 | Resolved: 2020-01-27

## 2020-01-27 PROBLEM — Z23 NEED FOR INFLUENZA VACCINATION: Status: RESOLVED | Noted: 2018-09-24 | Resolved: 2020-01-27

## 2020-01-27 PROBLEM — Z95.5 H/O HEART ARTERY STENT: Status: ACTIVE | Noted: 2019-06-27

## 2020-01-27 PROCEDURE — 3008F BODY MASS INDEX DOCD: CPT | Performed by: INTERNAL MEDICINE

## 2020-01-27 PROCEDURE — 1101F PT FALLS ASSESS-DOCD LE1/YR: CPT | Performed by: INTERNAL MEDICINE

## 2020-01-27 PROCEDURE — 99214 OFFICE O/P EST MOD 30 MIN: CPT | Performed by: INTERNAL MEDICINE

## 2020-01-27 PROCEDURE — 1160F RVW MEDS BY RX/DR IN RCRD: CPT | Performed by: INTERNAL MEDICINE

## 2020-01-27 PROCEDURE — 3074F SYST BP LT 130 MM HG: CPT | Performed by: INTERNAL MEDICINE

## 2020-01-27 PROCEDURE — 3288F FALL RISK ASSESSMENT DOCD: CPT | Performed by: INTERNAL MEDICINE

## 2020-01-27 PROCEDURE — 3078F DIAST BP <80 MM HG: CPT | Performed by: INTERNAL MEDICINE

## 2020-01-27 PROCEDURE — 3725F SCREEN DEPRESSION PERFORMED: CPT | Performed by: INTERNAL MEDICINE

## 2020-01-27 PROCEDURE — 1170F FXNL STATUS ASSESSED: CPT | Performed by: INTERNAL MEDICINE

## 2020-01-27 PROCEDURE — G0439 PPPS, SUBSEQ VISIT: HCPCS | Performed by: INTERNAL MEDICINE

## 2020-01-27 PROCEDURE — 1125F AMNT PAIN NOTED PAIN PRSNT: CPT | Performed by: INTERNAL MEDICINE

## 2020-01-27 NOTE — PROGRESS NOTES
Assessment/Plan:     Diagnoses and all orders for this visit:    Rhinitis medicamentosa  Comments:  stop nasal decongestant, nasal saline 3-4 times per day and c/w flonase  condition discussed & handout provided for educational purposes    Stage 4 very severe COPD by GOLD classification ([facility] )  Comments:  using supplemental oxygen, nebulizer, spiriva, wixela and seeing  pulm/[facility] lung transplant team for ongoing eval/care    Chronic combined systolic and diastolic congestive heart failure ([facility] )  Comments:  stable, taking entresto, crestor, BB, aspirin and seeing cardiology for ongoing care  Orders:  -     CBC and differential; Future    Healing wound  Comments:  on right buttock, advised on emollients to affected area and off-load with cushion while seated    Dry skin  Comments:  emollients such as aquaphor advised    Coronary artery disease involving native coronary artery of native heart without angina pectoris  -     Comprehensive metabolic panel; Future  -     Lipid Panel with Direct LDL reflex; Future  -     CBC and differential; Future    Essential hypertension  Comments:  BP borderline low, taking BB for CHF>>BP   patient stable -> c/w rx & at current dose  Orders:  -     CBC and differential; Future    Supplemental oxygen dependent    Need for hepatitis C screening test  Comments:  hasn't had before-> ordered  Orders:  -     Hepatitis C antibody; Future    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 shoulder (HCC)  Comments:  s/p derm eval/excision  f/u with derm for ongoing care    Mixed hyperlipidemia  -     Lipid Panel with Direct LDL reflex; Future    Screen for colon cancer  Comments:  FIT kit ordered per pt preference    D/w patient if FIT kit is positive, he will be recommended to have colonoscopy  Orders:  -     Occult Blood, Fecal Immunochemical; Future    Medicare annual wellness visit, subsequent      reminded of rx to avoid in nose including moisturizers/vaseline due to using supplemental O2  Will discuss AAA screening at f/u OV with me(likely had with previous PCP in [place])    Subjective:      Patient ID: Oni Littlejohn is a 69 y o  male  HPI    Here for follow up, not seen in >1 year  Hx of ischemic Cardiomyopathy, CHF/COPD on supplemental oxygen 24 hours per day  Sees  cardiology and pulmonary and [facility] to discuss lung transplant evaluation  Saba cardoza has remained stablel, but he has a few concerns today  He has nasal congestion and is using a nasal decongestant spray about 4 times per day for last ~1 year  He often wakes up in middle of night with dry mouth  No nasal discharge, no congestion, cough or fever  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 and develops 'cysts on his buttock recently  They are not itchy and come and go  He reports completing [employer] 's exam and passing(no records rec'd from this)  He reports completing flu vaccine this season and both pneumonia vaccines in past when living in [place]  Had colonoscopy in past, 10+ yrs ago and no polyps per patient  He prefers to complete FIT kit instead at this time  ROS otherwise negative, no other complaints      Past Medical History:   Diagnosis Date    Cancer [facility])     melanoma left arm and left chest    Cataracts, bilateral     Cough with sputum     "once in awhile"    Dental bridge present     upper    Enlarged prostate     Heart attack ([address] )     Hiatal hernia     History of coronary artery stent placement     History of pneumonia     History of transfusion     "possibly 30 yrs ago or so"    Hyperlipidemia     Hypertension     Oxygen dependent     Pulmonary emphysema (HCC)     Shortness of breath     Urinary frequency     Urinary urgency      Vitals:    01/27/20 0900   BP: 94/60   BP Location: Right arm   Patient Position: Sitting   Cuff Size: Standard   Pulse: 67   Temp: 98 3 °F (36 8 °C)   SpO2: 96%   Weight: 65 4 kg (144 lb 3 2 oz)   Height: 5' 10" (1 778 m) Body mass index is 20 69 kg/m²  Current Outpatient Medications:     albuterol (PROVENTIL HFA,VENTOLIN HFA) 90 mcg/act inhaler, INHALE 2 PUFFS EVERY 4 (FOUR) HOURS AS NEEDED FOR WHEEZING OR SHORTNESS OF BREATH, Disp: 54 g, Rfl: 2    aspirin (ECOTRIN LOW STRENGTH) 81 mg EC tablet, Take 1 tablet (81 mg total) by mouth daily, Disp: 90 tablet, Rfl: 3    carvedilol (COREG CR) 20 MG 24 hr capsule, Take 20 mg by mouth daily, Disp: , Rfl:     Cholecalciferol (D-400 PO), Take 1 tablet by mouth once a week, Disp: , Rfl:     cyanocobalamin (VITAMIN B-12) 1,000 mcg tablet, Take 1,000 mcg by mouth daily, Disp: , Rfl:     cyclopentolate (CYCLOGYL) 1 % ophthalmic solution, , Disp: , Rfl:     ibuprofen (MOTRIN) 200 mg tablet, Take by mouth every 6 (six) hours as needed for mild pain, Disp: , Rfl:     ipratropium-albuterol (DUO-NEB) 0 5-2 5 mg/3 mL nebulizer solution, Take 1 vial (3 mL total) by nebulization 5 (five) times a day, Disp: 450 vial, Rfl: 3    NASACORT ALLERGY 24HR 55 MCG/ACT AERO, , Disp: , Rfl:     OXYGEN-HELIUM IN, Inhale 3 liters NC continuously, Disp: , Rfl:     rosuvastatin (CRESTOR) 40 MG tablet, Take 1 tablet (40 mg total) by mouth daily, Disp: 90 tablet, Rfl: 2    sacubitril-valsartan (ENTRESTO) 24-26 MG TABS, Take 1 tablet by mouth 2 (two) times a day, Disp: 60 tablet, Rfl: 3    SPIRIVA RESPIMAT 2 5 MCG/ACT AERS inhaler, , Disp: , Rfl:     WIXELA INHUB 250-50 MCG/DOSE inhaler, INHALE 1 PUFF EVERY 12 (TWELVE) HOURS , Disp: 1 Inhaler, Rfl: 5  No Known Allergies      Review of Systems   Constitutional: Negative for fever  HENT: Positive for congestion (nasal only)  Eyes: Negative for visual disturbance  Respiratory: Negative for cough  Cardiovascular: Negative for chest pain  Gastrointestinal: Negative for abdominal pain  Endocrine: Negative for polyuria  Genitourinary: Negative for dysuria  Musculoskeletal: Negative for arthralgias  Skin: Positive for rash  Allergic/Immunologic: Negative for immunocompromised state  Neurological: Negative for headaches  Psychiatric/Behavioral: Negative for dysphoric mood  Objective:      BP 94/60 (BP Location: Right arm, Patient Position: Sitting, Cuff Size: Standard)   Pulse 67   Temp 98 3 °F (36 8 °C)   Ht 5' 10" (1 778 m)   Wt 65 4 kg (144 lb 3 2 oz)   SpO2 96%   BMI 20 69 kg/m²          Physical Exam   Constitutional: He appears well-developed and well-nourished  Using supplemental oxygen via NC and comfortable, in no acute distress   HENT:   Head: Normocephalic and atraumatic  Right Ear: Tympanic membrane normal    Left Ear: Tympanic membrane normal    Nose: No mucosal edema (nasal passages appear dry b/l) or rhinorrhea  Mouth/Throat: No posterior oropharyngeal erythema  Eyes: Pupils are equal, round, and reactive to light  Cardiovascular: Normal rate and regular rhythm  Pulmonary/Chest: Effort normal and breath sounds normal  He has no wheezes  He has no rales  Abdominal: Soft  Bowel sounds are normal  There is no tenderness  Musculoskeletal: He exhibits no edema  Neurological: He is alert  Skin: Skin is dry  Rash (dry skin dermatitits on buttocks b/l) noted  Psychiatric: He has a normal mood and affect  His behavior is normal    Vitals reviewed

## 2020-01-27 NOTE — PATIENT INSTRUCTIONS
1  Stop nasal decongestant sprays such as afrin  2  flonase or similar daily  3  Nasal saline spray 3-4 times per day  4  Moisturizer to dry skin such as aquaphor  5  Consider donut cushion while sitting  6  Blood work in summer  7  Return in July or sooner if questions  8   Humidifier in bedroom

## 2020-01-27 NOTE — PROGRESS NOTES
Assessment and Plan:     Problem List Items Addressed This Visit     Chronic combined systolic and diastolic congestive heart failure (HCC) (Chronic)    Relevant Orders    CBC and differential    Coronary artery disease without angina pectoris (Chronic)    Relevant Orders    Comprehensive metabolic panel    Lipid Panel with Direct LDL reflex    CBC and differential    Essential hypertension    Relevant Orders    CBC and differential    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 shoulder (HCC)    Mixed hyperlipidemia    Relevant Orders    Lipid Panel with Direct LDL reflex    Stage 4 very severe COPD by GOLD classification ([facility] [address] )    Supplemental oxygen dependent      Other Visit Diagnoses     Rhinitis medicamentosa    -  Primary    stop nasal decongestant, nasal saline 3-4 times per day and c/w flonase  condition discussed & handout provided for educational purposes    Healing wound        on right buttock, advised on emollients to affected area and off-load with cushion while seated    Dry skin        emollients such as aquaphor advised    Need for hepatitis C screening test        hasn't had before-> ordered    Relevant Orders    Hepatitis C antibody    Screen for colon cancer        FIT kit ordered per pt preference  D/w patient if FIT kit is positive, he will be recommended to have colonoscopy    Relevant Orders    Occult Blood, Fecal Immunochemical    Medicare annual wellness visit, subsequent               Preventive health issues were discussed with patient, and age appropriate screening tests were ordered as noted in patient's After Visit Summary  Personalized health advice and appropriate referrals for health education or preventive services given if needed, as noted in patient's After Visit Summary       History of Present Illness:     Patient presents for Medicare Annual Wellness visit    Patient Care Team:  Jerome Li DO as PCP - General (Internal Medicine)     Problem List:     Patient Active Problem List   Diagnosis    Chronic respiratory failure with hypoxia and hypercapnia (HCC)    Coronary artery disease without angina pectoris    History of tobacco use    Chronic combined systolic and diastolic congestive heart failure (HCC)    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 shoulder (HCC)    Frequency of urination    BPH with urinary obstruction    Urge incontinence    Benign prostatic hyperplasia with urinary retention    Stage 4 very severe COPD by GOLD classification ([facility] [address] )    Essential hypertension    Dyslipidemia    H/O heart artery stent    Mixed hyperlipidemia    Supplemental oxygen dependent      Past Medical and Surgical History:     Past Medical History:   Diagnosis Date    Cancer ([address] )     melanoma left arm and left chest    Cataracts, bilateral     Cough with sputum     "once in awhile"    Dental bridge present     upper    Enlarged prostate     Heart attack ([address] )     Hiatal hernia     History of coronary artery stent placement     History of pneumonia     History of transfusion     "possibly 30 yrs ago or so"    Hyperlipidemia     Hypertension     Oxygen dependent     Pulmonary emphysema ([address] )     Shortness of breath     Urinary frequency     Urinary urgency      Past Surgical History:   Procedure Laterality Date    ANKLE SURGERY Right     BLADDER SURGERY  08/2018    COLONOSCOPY  2008    CORONARY STENT PLACEMENT  2008    HERNIA REPAIR      right inguinal hernia    LASIK Bilateral     DE TRANSURETHRAL ELEC-SURG PROSTATECTOM N/A 8/14/2018    Procedure: CYSTOSCOPY, TRANSURETHRAL RESECTION OF PROSTATE (TURP);   Surgeon: Earlene Roberts MD;  [facility];  Service: Urology    SKIN CANCER EXCISION Left     arm and chest/melanoma    TONSILLECTOMY      WISDOM TOOTH EXTRACTION        Family History:     Family History   Problem Relation Age of Onset    Heart disease Father     Hypertension Father     Coronary artery disease Father  Hyperlipidemia Father     Heart disease Maternal Aunt     Heart disease Maternal Uncle     Cancer Neg Hx     Diabetes Neg Hx     Stroke Neg Hx     Arthritis Neg Hx     Sudden death Neg Hx         cardiac      Social History:     Social History     Socioeconomic History    Marital status: Single     Spouse name: None    Number of children: None    Years of education: None    Highest education level: None   Occupational History    None   Social Needs    Financial resource strain: None    Food insecurity:     Worry: None     Inability: None    Transportation needs:     Medical: None     Non-medical: None   Tobacco Use    Smoking status: Former Smoker     Packs/day: 1 00     Years: 52 00     Pack years: 52 00     Types: Cigarettes     Last attempt to quit: 2018     Years since quittin 7    Smokeless tobacco: Never Used   Substance and Sexual Activity    Alcohol use:  Yes     Alcohol/week: 2 0 standard drinks     Types: 2 Glasses of wine per week     Comment: 2 glasses of wine per week , heavier drinker years ago    Drug use: No    Sexual activity: None   Lifestyle    Physical activity:     Days per week: None     Minutes per session: None    Stress: None   Relationships    Social connections:     Talks on phone: None     Gets together: None     Attends Baptism service: None     Active member of club or organization: None     Attends meetings of clubs or organizations: None     Relationship status: None    Intimate partner violence:     Fear of current or ex partner: None     Emotionally abused: None     Physically abused: None     Forced sexual activity: None   Other Topics Concern    None   Social History Narrative    None       Medications and Allergies:     Current Outpatient Medications   Medication Sig Dispense Refill    albuterol (PROVENTIL HFA,VENTOLIN HFA) 90 mcg/act inhaler INHALE 2 PUFFS EVERY 4 (FOUR) HOURS AS NEEDED FOR WHEEZING OR SHORTNESS OF BREATH 54 g 2    aspirin (ECOTRIN LOW STRENGTH) 81 mg EC tablet Take 1 tablet (81 mg total) by mouth daily 90 tablet 3    carvedilol (COREG CR) 20 MG 24 hr capsule Take 20 mg by mouth daily      Cholecalciferol (D-400 PO) Take 1 tablet by mouth once a week      cyanocobalamin (VITAMIN B-12) 1,000 mcg tablet Take 1,000 mcg by mouth daily      cyclopentolate (CYCLOGYL) 1 % ophthalmic solution       ibuprofen (MOTRIN) 200 mg tablet Take by mouth every 6 (six) hours as needed for mild pain      ipratropium-albuterol (DUO-NEB) 0 5-2 5 mg/3 mL nebulizer solution Take 1 vial (3 mL total) by nebulization 5 (five) times a day 450 vial 3    NASACORT ALLERGY 24HR 55 MCG/ACT AERO       OXYGEN-HELIUM IN Inhale 3 liters NC continuously      rosuvastatin (CRESTOR) 40 MG tablet Take 1 tablet (40 mg total) by mouth daily 90 tablet 2    sacubitril-valsartan (ENTRESTO) 24-26 MG TABS Take 1 tablet by mouth 2 (two) times a day 60 tablet 3    SPIRIVA RESPIMAT 2 5 MCG/ACT AERS inhaler       WIXELA INHUB 250-50 MCG/DOSE inhaler INHALE 1 PUFF EVERY 12 (TWELVE) HOURS  1 Inhaler 5     No current facility-administered medications for this visit        No Known Allergies   Immunizations:     Immunization History   Administered Date(s) Administered    INFLUENZA 12/16/2019    Influenza Split High Dose Preservative Free IM 10/01/2018    Influenza, high dose seasonal 0 5 mL 09/24/2018    Pneumococcal Conjugate 13-Valent 07/05/2016    Pneumococcal Polysaccharide PPV23 07/01/2015      Health Maintenance:         Topic Date Due    Hepatitis C Screening  1945    CRC Screening: FOBTx3/FIT  05/28/1996         Topic Date Due    DTaP,Tdap,and Td Vaccines (1 - Tdap) 05/28/1957    Pneumococcal Vaccine: 65+ Years (1 of 2 - PCV13) 05/28/2011      Medicare Health Risk Assessment:     BP 94/60 (BP Location: Right arm, Patient Position: Sitting, Cuff Size: Standard)   Pulse 67   Temp 98 3 °F (36 8 °C)   Ht 5' 10" (1 778 m)   Wt 65 4 kg (144 lb 3 2 oz)   SpO2 96%   BMI 20 69 kg/m²      Melissa Herman is here for his Subsequent Wellness visit  Last Medicare Wellness visit information reviewed, patient interviewed and updates made to the record today  Health Risk Assessment:   Patient rates overall health as fair  Patient feels that their physical health rating is same  Eyesight was rated as same  Hearing was rated as same  Patient feels that their emotional and mental health rating is same  Pain experienced in the last 7 days has been none  Patient states that he has experienced no weight loss or gain in last 6 months  Depression Screening:   PHQ-2 Score: 0      Fall Risk Screening: In the past year, patient has experienced: no history of falling in past year      Home Safety:  Patient has trouble with stairs inside or outside of their home  Patient has working smoke alarms and has working carbon monoxide detector  Home safety hazards include: none  Nutrition:   Current diet is Regular  Medications:   Patient is currently taking over-the-counter supplements  OTC medications include: see medication list  Patient is able to manage medications  Activities of Daily Living (ADLs)/Instrumental Activities of Daily Living (IADLs):   Walk and transfer into and out of bed and chair?: Yes  Dress and groom yourself?: Yes    Bathe or shower yourself?: Yes    Feed yourself?  Yes  Do your laundry/housekeeping?: Yes  Manage your money, pay your bills and track your expenses?: Yes  Make your own meals?: Yes    Do your own shopping?: Yes    Previous Hospitalizations:   Any hospitalizations or ED visits within the last 12 months?: No      Advance Care Planning:   Living will: No    Durable POA for healthcare: No    Advanced directive: No      PREVENTIVE SCREENINGS      Cardiovascular Screening:    General: History Lipid Disorder      Diabetes Screening:     General: Screening Current      Colorectal Cancer Screening:     General: Risks and Benefits Discussed    Due for: FOBT/FIT      Prostate Cancer Screening:    General: Screening Not Indicated      Osteoporosis Screening:    General: Screening Not Indicated      Abdominal Aortic Aneurysm (AAA) Screening:    Risk factors include: age between 73-67 yo and tobacco use        Lung Cancer Screening:     General: Screening Current      Hepatitis C Screening:    General: Risks and Benefits Discussed    Hep C Screening Accepted: Yes        Preventive Screening Comments:  Will discuss AAA screening at f/u appt(likely had in past at previous PCP in [place])      Orlando Domingo DO

## 2020-02-04 ENCOUNTER — OFFICE VISIT (OUTPATIENT)
Dept: CARDIOLOGY CLINIC | Facility: CLINIC | Age: 75
End: 2020-02-04
Payer: COMMERCIAL

## 2020-02-04 VITALS
HEART RATE: 94 BPM | SYSTOLIC BLOOD PRESSURE: 100 MMHG | WEIGHT: 147.4 LBS | OXYGEN SATURATION: 88 % | DIASTOLIC BLOOD PRESSURE: 60 MMHG | HEIGHT: 70 IN | BODY MASS INDEX: 21.1 KG/M2

## 2020-02-04 DIAGNOSIS — I25.10 CORONARY ARTERY DISEASE INVOLVING NATIVE CORONARY ARTERY OF NATIVE HEART WITHOUT ANGINA PECTORIS: Chronic | ICD-10-CM

## 2020-02-04 DIAGNOSIS — E78.2 MIXED HYPERLIPIDEMIA: ICD-10-CM

## 2020-02-04 DIAGNOSIS — E78.5 DYSLIPIDEMIA: ICD-10-CM

## 2020-02-04 DIAGNOSIS — I10 ESSENTIAL HYPERTENSION: Primary | ICD-10-CM

## 2020-02-04 DIAGNOSIS — J44.9 STAGE 4 VERY SEVERE COPD BY GOLD CLASSIFICATION (HCC): ICD-10-CM

## 2020-02-04 DIAGNOSIS — I25.5 ISCHEMIC CARDIOMYOPATHY: ICD-10-CM

## 2020-02-04 PROCEDURE — 3078F DIAST BP <80 MM HG: CPT | Performed by: INTERNAL MEDICINE

## 2020-02-04 PROCEDURE — 3008F BODY MASS INDEX DOCD: CPT | Performed by: INTERNAL MEDICINE

## 2020-02-04 PROCEDURE — 4040F PNEUMOC VAC/ADMIN/RCVD: CPT | Performed by: INTERNAL MEDICINE

## 2020-02-04 PROCEDURE — 93000 ELECTROCARDIOGRAM COMPLETE: CPT | Performed by: INTERNAL MEDICINE

## 2020-02-04 PROCEDURE — 3074F SYST BP LT 130 MM HG: CPT | Performed by: INTERNAL MEDICINE

## 2020-02-04 PROCEDURE — 99214 OFFICE O/P EST MOD 30 MIN: CPT | Performed by: INTERNAL MEDICINE

## 2020-02-04 PROCEDURE — 1170F FXNL STATUS ASSESSED: CPT | Performed by: INTERNAL MEDICINE

## 2020-02-04 PROCEDURE — 1160F RVW MEDS BY RX/DR IN RCRD: CPT | Performed by: INTERNAL MEDICINE

## 2020-02-04 PROCEDURE — 1036F TOBACCO NON-USER: CPT | Performed by: INTERNAL MEDICINE

## 2020-02-04 NOTE — PROGRESS NOTES
Cardiology   Von Samuels 69 y o  male MRN: [id number]        Impression:  1  Ischemic cardiomyopathy - EF 20% by echo  Discussed ICD with patient, and will defer until after decision is made concerning transplant listing, and if having a device will affect candidacy for transplant  2  COPD - on chronic O2  Currently evaluated for possible Lung Transplantation  3  Dyslipidemia - on statin      Recommendations:  1  Continue current medications  2  Will decide on ICD after discussion with Dr Donney Rinne concerning transplant eligibility  3  Follow up in 6 months  HPI: Von Samuels is a 69y o  year old male with ischemic cardiomyopathy s/p multiple PCI, COPD, who presents for follow up   Recently moved from [place]   Does wear chronic oxygen of 3L, and has dyspnea on exertion   An echocardiogram 6/19 demonstrated EF 20%, which is essentially unchanged from 2017   No chest pain or palpitations    Feels like breathing is stable           Review of Systems   Constitutional: Negative  HENT: Negative  Eyes: Negative  Respiratory: Positive for shortness of breath  Negative for chest tightness  Cardiovascular: Negative for chest pain, palpitations and leg swelling  Gastrointestinal: Negative  Endocrine: Negative  Genitourinary: Negative  Musculoskeletal: Negative  Skin: Negative  Allergic/Immunologic: Negative  Neurological: Negative  Hematological: Negative  Psychiatric/Behavioral: Negative  All other systems reviewed and are negative          Past Medical History:   Diagnosis Date    Cancer [facility])     melanoma left arm and left chest    Cataracts, bilateral     Cough with sputum     "once in awhile"    Dental bridge present     upper    Enlarged prostate     Heart attack ([address] )     Hiatal hernia     History of coronary artery stent placement     History of pneumonia     History of transfusion     "possibly 30 yrs ago or so"    Hyperlipidemia  Hypertension     Oxygen dependent     Pulmonary emphysema (HCC)     Shortness of breath     Urinary frequency     Urinary urgency      Past Surgical History:   Procedure Laterality Date    ANKLE SURGERY Right     BLADDER SURGERY  2018    COLONOSCOPY  2008    CORONARY STENT PLACEMENT      HERNIA REPAIR      right inguinal hernia    LASIK Bilateral     LA TRANSURETHRAL ELEC-SURG PROSTATECTOM N/A 2018    Procedure: CYSTOSCOPY, TRANSURETHRAL RESECTION OF PROSTATE (TURP);   Surgeon: Aleksandra Nunez MD;  [facility];  Service: Urology    SKIN CANCER EXCISION Left     arm and chest/melanoma    TONSILLECTOMY      WISDOM TOOTH EXTRACTION       Social History     Substance and Sexual Activity   Alcohol Use Yes    Alcohol/week: 2 0 standard drinks    Types: 2 Glasses of wine per week    Comment: 2 glasses of wine per week , heavier drinker years ago     Social History     Substance and Sexual Activity   Drug Use No     Social History     Tobacco Use   Smoking Status Former Smoker    Packs/day: 1     Years: 52 00    Pack years: 52 00    Types: Cigarettes    Last attempt to quit: 2018    Years since quittin 7   Smokeless Tobacco Never Used     Family History   Problem Relation Age of Onset    Heart disease Father     Hypertension Father     Coronary artery disease Father     Hyperlipidemia Father     Heart disease Maternal Aunt     Heart disease Maternal Uncle     Cancer Neg Hx     Diabetes Neg Hx     Stroke Neg Hx     Arthritis Neg Hx     Sudden death Neg Hx         cardiac       Allergies:  No Known Allergies    Medications:     Current Outpatient Medications:     albuterol (PROVENTIL HFA,VENTOLIN HFA) 90 mcg/act inhaler, INHALE 2 PUFFS EVERY 4 (FOUR) HOURS AS NEEDED FOR WHEEZING OR SHORTNESS OF BREATH, Disp: 54 g, Rfl: 2    aspirin (ECOTRIN LOW STRENGTH) 81 mg EC tablet, Take 1 tablet (81 mg total) by mouth daily, Disp: 90 tablet, Rfl: 3    carvedilol (COREG CR) 20 MG 24 hr capsule, Take 20 mg by mouth daily, Disp: , Rfl:     Cholecalciferol (D-400 PO), Take 1 tablet by mouth once a week, Disp: , Rfl:     cyanocobalamin (VITAMIN B-12) 1,000 mcg tablet, Take 1,000 mcg by mouth daily, Disp: , Rfl:     cyclopentolate (CYCLOGYL) 1 % ophthalmic solution, , Disp: , Rfl:     ibuprofen (MOTRIN) 200 mg tablet, Take by mouth every 6 (six) hours as needed for mild pain, Disp: , Rfl:     ipratropium-albuterol (DUO-NEB) 0 5-2 5 mg/3 mL nebulizer solution, Take 1 vial (3 mL total) by nebulization 5 (five) times a day, Disp: 450 vial, Rfl: 3    NASACORT ALLERGY 24HR 55 MCG/ACT AERO, , Disp: , Rfl:     OXYGEN-HELIUM IN, Inhale 3 liters NC continuously, Disp: , Rfl:     rosuvastatin (CRESTOR) 40 MG tablet, Take 1 tablet (40 mg total) by mouth daily, Disp: 90 tablet, Rfl: 2    sacubitril-valsartan (ENTRESTO) 24-26 MG TABS, Take 1 tablet by mouth 2 (two) times a day, Disp: 60 tablet, Rfl: 3    SPIRIVA RESPIMAT 2 5 MCG/ACT AERS inhaler, , Disp: , Rfl:     WIXELA INHUB 250-50 MCG/DOSE inhaler, INHALE 1 PUFF EVERY 12 (TWELVE) HOURS , Disp: 1 Inhaler, Rfl: 5      Wt Readings from Last 3 Encounters:   02/04/20 66 9 kg (147 lb 6 4 oz)   01/27/20 65 4 kg (144 lb 3 2 oz)   09/26/19 65 3 kg (144 lb)     Temp Readings from Last 3 Encounters:   01/27/20 98 3 °F (36 8 °C)   04/04/19 (!) 97 °F (36 1 °C) (Tympanic)   12/12/18 (!) 97 2 °F (36 2 °C) (Tympanic)     BP Readings from Last 3 Encounters:   02/04/20 100/60   01/27/20 94/60   09/26/19 96/62     Pulse Readings from Last 3 Encounters:   02/04/20 94   01/27/20 67   09/26/19 83         Physical Exam   Constitutional: He is oriented to person, place, and time  He appears well-developed  HENT:   Head: Atraumatic  Eyes: EOM are normal    Neck: Normal range of motion  Cardiovascular: Normal rate, regular rhythm and normal heart sounds  Exam reveals no gallop  No murmur heard    Pulmonary/Chest: Effort normal and breath sounds normal  Abdominal: Soft  Musculoskeletal: Normal range of motion  Neurological: He is alert and oriented to person, place, and time  Skin: Skin is warm and dry  Psychiatric: He has a normal mood and affect  Laboratory Studies:  CMP:  Lab Results   Component Value Date    K 3 9 2019     2019    CO2 31 2019    BUN 16 2019    CREATININE 1 00 2019    GLUCOSE 119 10/08/2018    AST 14 2019    ALT 18 2019    EGFR 74 2019       Lipid Profile:   No results found for: CHOL  Lab Results   Component Value Date    HDL 47 2019     Lab Results   Component Value Date    LDLCALC 42 2019     Lab Results   Component Value Date    TRIG 68 2019       Cardiac testing:   EKG reviewed personally: JAMES Torres AMI NSSTTWA  Results for orders placed during the hospital encounter of 19   Echo complete with contrast if indicated    Narrative [place] [address] [address] [place] [address]  [phone]    Transthoracic Echocardiogram  2D, M-mode, Doppler, and Color Doppler    Study date:  2019    Patient: Qian Long  MR number: [id number]  Account number: [de-identified]  : 1945  Age: 69 years  Gender: Male  Status: Outpatient  [facility] [address] [facility]  Height: 71 in  Weight: 150 7 lb  BP: 112/ 64 mmHg    Indications: Assess left ventricular function  Diagnoses: I42 9 - Cardiomyopathy, unspecified    Sonographer:  CARA Bowling  Primary Physician:  Kailee Kline DO  Referring Physician:  Tramaine Alonso MD  Group:  [place] [id number] [facility]  Interpreting Physician:  Tramaine Alonso MD    SUMMARY    LEFT VENTRICLE:  Size was normal   Systolic function was severely reduced  Ejection fraction was estimated to be 20 %  There was severe diffuse hypokinesis with akinesis of the mid to distal anterior, anteroseptal, anterolateral, distal inferior, and apical walls    Wall thickness was normal   Doppler parameters were consistent with abnormal left ventricular relaxation (grade 1 diastolic dysfunction)  RIGHT VENTRICLE:  The size was normal   Systolic function was normal     MITRAL VALVE:  There was trace regurgitation  TRICUSPID VALVE:  There was mild regurgitation  COMPARISONS:  Comparison was made with the previous study of 20-May-2018  Ejection fraction has decreased  HISTORY: PRIOR HISTORY: Cardiomyopathy, CAD s/p MI and stent, Hypertension, Hyperlipidemia, COPD    PROCEDURE: The study was performed in the [facility]  This was a routine study  The transthoracic approach was used  The study included complete 2D imaging, M-mode, complete spectral Doppler, and color Doppler  The  heart rate was 67 bpm, at the start of the study  Images were obtained from the parasternal, apical, subcostal, and suprasternal notch acoustic windows  Image quality was adequate  LEFT VENTRICLE: Size was normal  Systolic function was severely reduced  Ejection fraction was estimated to be 20 %  There was severe diffuse hypokinesis with akinesis of the mid to distal anterior, anteroseptal, anterolateral, distal  inferior, and apical walls  Wall thickness was normal  DOPPLER: Doppler parameters were consistent with abnormal left ventricular relaxation (grade 1 diastolic dysfunction)  RIGHT VENTRICLE: The size was normal  Systolic function was normal  Wall thickness was normal     LEFT ATRIUM: Size was normal     RIGHT ATRIUM: Size was normal     MITRAL VALVE: Valve structure was normal  There was normal leaflet separation  DOPPLER: The transmitral velocity was within the normal range  There was no evidence for stenosis  There was trace regurgitation  AORTIC VALVE: The valve was trileaflet  Leaflets exhibited normal thickness and normal cuspal separation  DOPPLER: Transaortic velocity was within the normal range  There was no evidence for stenosis   There was no regurgitation  TRICUSPID VALVE: The valve structure was normal  There was normal leaflet separation  DOPPLER: The transtricuspid velocity was within the normal range  There was no evidence for stenosis  There was mild regurgitation  The tricuspid jet  envelope definition was inadequate for estimation of RV systolic pressure  There are no indirect findings suggestive of moderate or severe pulmonary hypertension  PULMONIC VALVE: Leaflets exhibited normal thickness, no calcification, and normal cuspal separation  DOPPLER: The transpulmonic velocity was within the normal range  There was no regurgitation  PERICARDIUM: There was no pericardial effusion  The pericardium was normal in appearance  AORTA: The root exhibited normal size      SYSTEM MEASUREMENT TABLES    2D  %FS: 10 16 %  AV Diam: 3 13 cm  EDV(Teich): 175 86 ml  EF Biplane: 27 91 %  EF(Cube): 27 48 %  EF(Teich): 21 82 %  ESV(Cube): 151 93 ml  ESV(Teich): 137 48 ml  IVSd: 0 6 cm  LA Area: 14 74 cm2  LA Diam: 3 73 cm  LVEDV MOD A2C: 187 42 ml  LVEDV MOD A4C: 181 57 ml  LVEDV MOD BP: 191 95 ml  LVEF MOD A2C: 22 72 %  LVEF MOD A4C: 25 16 %  LVESV MOD A2C: 144 84 ml  LVESV MOD A4C: 135 88 ml  LVESV MOD BP: 138 37 ml  LVIDd: 5 94 cm  LVIDs: 5 34 cm  LVLd A2C: 10 23 cm  LVLd A4C: 9 93 cm  LVLs A2C: 9 79 cm  LVLs A4C: 9 74 cm  LVPWd: 0 72 cm  RA Area: 12 54 cm2  RV Diam: 3 64 cm  SI(Cube): 30 78 ml/m2  SI(Teich): 20 52 ml/m2  SV MOD A2C: 42 58 ml  SV MOD A4C: 45 69 ml  SV(Cube): 57 56 ml  SV(Teich): 38 38 ml    CW  TR MaxP 47 mmHg  TR Vmax: 2 89 m/s    MM  TAPSE: 2 09 cm    PW  AVC: 334 33 ms  E': 0 05 m/s  E/E': 11 68  MV A Bogdan: 0 63 m/s  MV Dec [place]: 3 42 m/s2  MV DecT: 158 63 ms  MV E Bogdan: 0 54 m/s  MV E/A Ratio: 0 86    Inters\[facility]\"" Commission Accredited Echocardiography Laboratory    Prepared and electronically signed by    Kit Horowitz MD  Signed 2019 16:22:48

## 2020-02-04 NOTE — PATIENT INSTRUCTIONS
Recommendations:  1  Continue current medications  2  Will decide on ICD after discussion with Dr William Hong concerning transplant eligibility  3  Follow up in 6 months

## 2020-02-12 DIAGNOSIS — I50.42 CHRONIC COMBINED SYSTOLIC AND DIASTOLIC CONGESTIVE HEART FAILURE (HCC): Chronic | ICD-10-CM

## 2020-02-17 ENCOUNTER — APPOINTMENT (OUTPATIENT)
Dept: LAB | Facility: HOSPITAL | Age: 75
End: 2020-02-17
Payer: COMMERCIAL

## 2020-02-17 DIAGNOSIS — Z12.11 SCREEN FOR COLON CANCER: ICD-10-CM

## 2020-02-17 LAB — HEMOCCULT STL QL IA: NEGATIVE

## 2020-02-17 PROCEDURE — G0328 FECAL BLOOD SCRN IMMUNOASSAY: HCPCS

## 2020-02-18 ENCOUNTER — TELEPHONE (OUTPATIENT)
Dept: INTERNAL MEDICINE CLINIC | Facility: CLINIC | Age: 75
End: 2020-02-18

## 2020-02-18 NOTE — TELEPHONE ENCOUNTER
----- Message from Oseas Pinzon DO sent at 2/18/2020 10:54 AM EST -----  Stool test for colon cancer screening is negative    Let's repeat this test in 12 months, thank you

## 2020-02-19 ENCOUNTER — OFFICE VISIT (OUTPATIENT)
Dept: PULMONOLOGY | Facility: CLINIC | Age: 75
End: 2020-02-19
Payer: COMMERCIAL

## 2020-02-19 VITALS
WEIGHT: 145 LBS | SYSTOLIC BLOOD PRESSURE: 110 MMHG | OXYGEN SATURATION: 99 % | HEIGHT: 70 IN | RESPIRATION RATE: 18 BRPM | BODY MASS INDEX: 20.76 KG/M2 | DIASTOLIC BLOOD PRESSURE: 60 MMHG | TEMPERATURE: 98 F | HEART RATE: 92 BPM

## 2020-02-19 DIAGNOSIS — J96.21 ACUTE ON CHRONIC RESPIRATORY FAILURE WITH HYPOXIA AND HYPERCAPNIA (HCC): ICD-10-CM

## 2020-02-19 DIAGNOSIS — J96.11 CHRONIC RESPIRATORY FAILURE WITH HYPOXIA AND HYPERCAPNIA (HCC): Primary | ICD-10-CM

## 2020-02-19 DIAGNOSIS — J44.1 COPD WITH ACUTE EXACERBATION (HCC): ICD-10-CM

## 2020-02-19 DIAGNOSIS — I25.5 ISCHEMIC CARDIOMYOPATHY: ICD-10-CM

## 2020-02-19 DIAGNOSIS — J96.12 CHRONIC RESPIRATORY FAILURE WITH HYPOXIA AND HYPERCAPNIA (HCC): Primary | ICD-10-CM

## 2020-02-19 DIAGNOSIS — J96.22 ACUTE ON CHRONIC RESPIRATORY FAILURE WITH HYPOXIA AND HYPERCAPNIA (HCC): ICD-10-CM

## 2020-02-19 DIAGNOSIS — J44.9 STAGE 4 VERY SEVERE COPD BY GOLD CLASSIFICATION (HCC): ICD-10-CM

## 2020-02-19 PROCEDURE — 4040F PNEUMOC VAC/ADMIN/RCVD: CPT | Performed by: INTERNAL MEDICINE

## 2020-02-19 PROCEDURE — 99214 OFFICE O/P EST MOD 30 MIN: CPT | Performed by: INTERNAL MEDICINE

## 2020-02-19 PROCEDURE — 1036F TOBACCO NON-USER: CPT | Performed by: INTERNAL MEDICINE

## 2020-02-19 PROCEDURE — 1170F FXNL STATUS ASSESSED: CPT | Performed by: INTERNAL MEDICINE

## 2020-02-19 PROCEDURE — 3008F BODY MASS INDEX DOCD: CPT | Performed by: INTERNAL MEDICINE

## 2020-02-19 PROCEDURE — 3078F DIAST BP <80 MM HG: CPT | Performed by: INTERNAL MEDICINE

## 2020-02-19 PROCEDURE — 3074F SYST BP LT 130 MM HG: CPT | Performed by: INTERNAL MEDICINE

## 2020-02-19 PROCEDURE — 1160F RVW MEDS BY RX/DR IN RCRD: CPT | Performed by: INTERNAL MEDICINE

## 2020-02-19 NOTE — LETTER
February 19, 2020     Floyd Fall DO  [address]    Patient: Uyen Florian   YOB: 1945   Date of Visit: 2/19/2020       Dear Dr Jair Ron:    Thank you for referring Uyen Florian to me for evaluation  Below are my notes for this consultation  If you have questions, please do not hesitate to call me  I look forward to following your patient along with you  Sincerely,        Andres Jacobs DO        CC: MD Andres Kimball DO  2/19/2020  2:53 PM  Signed  Pulmonary Follow Up Note   Uyen Florian 69 y o  male MRN: [id number]  2/19/2020      Assessment/Plan:     Stage 4 very severe COPD by GOLD classification [facility])  Patient has advanced, very severe COPD with severe air trapping  Unfortunately, given pattern of emphysema, he is not a candidate for bronchoscopic lung volume reduction  There was brief conversation regarding possible lung transplantation, however given congestive heart failure, I do not believe he will be a candidate at this time  I will discuss further with Dr Antoine Logan whether it would be reasonable to pursue that option  For now, he will continue with generic Advair and DuoNeb  We will discontinue Spiriva as it has contributed to dry mouth and has not been beneficial     Chronic respiratory failure with hypoxia and hypercapnia (HCC)    Oxygenation is adequate on 3 L nasal cannula  Ischemic cardiomyopathy  Once we determine whether lung transplant is an option, patient can work with cardiology to decide on appropriate timing for ongoing cardiac workup and ICD placement  However, it seems that the patient will need to move forward with this option regardless of transplant decision      Visit orders:    Diagnoses and all orders for this visit:    Chronic respiratory failure with hypoxia and hypercapnia (HCC)    Stage 4 very severe COPD by GOLD classification ([facility] [address] )    Acute on chronic respiratory failure with hypoxia and hypercapnia (HCC)  -     fluticasone-salmeterol (Wixela Inhub) 250-50 mcg/dose inhaler; Inhale 1 puff every 12 (twelve) hours Rinse mouth after use  COPD with acute exacerbation (HCC)  -     fluticasone-salmeterol (Wixela Inhub) 250-50 mcg/dose inhaler; Inhale 1 puff every 12 (twelve) hours Rinse mouth after use  Ischemic cardiomyopathy        History of Present Illness   HPI:  Mikel Mason is a 69 y o  male who is here today for follow-up regarding severe COPD and chronic hypoxic and hypercapnic respiratory failure  He was seen by [facility] in August and was determined not to be a candidate for bronchoscopic lung volume reduction due to homogeneous emphysema  At that time, it was suggested that he pursue transplant evaluation  In the meantime, he saw cardiology last week who suggested placement of an ICD  The decision is pending conversation regarding lung transplant  Patient has shortness of breath with even minimal exertion  He is using oxygen around the clock  He has no significant cough, wheeze or sputum production  He was compliant with Wixela and DuoNeb 3-4 times per day  At his visit with [facility], he was started on Spiriva  He did not find to be beneficial   He has an extremely dry mouth and also some difficulties with slow urine    Review of Systems   Constitutional: Negative for chills, fever and unexpected weight change  HENT: Negative for postnasal drip and sore throat  Eyes: Negative for visual disturbance  Respiratory:        As noted in HPI   Cardiovascular: Negative for chest pain  Gastrointestinal: Negative for abdominal pain, diarrhea and vomiting  Genitourinary: Negative for difficulty urinating  Skin: Negative for rash  Neurological: Negative for headaches  Hematological: Negative for adenopathy  Psychiatric/Behavioral: Negative  All other systems reviewed and are negative          Historical Information   Past Medical History: Diagnosis Date    Cancer [facility])     melanoma left arm and left chest    Cataracts, bilateral     Cough with sputum     "once in awhile"    Dental bridge present     upper    Enlarged prostate     Heart attack ([facility] [address] )     Hiatal hernia     History of coronary artery stent placement     History of pneumonia     History of transfusion     "possibly 30 yrs ago or so"    Hyperlipidemia     Hypertension     Oxygen dependent     Pulmonary emphysema ([facility] [address] )     Shortness of breath     Urinary frequency     Urinary urgency      Past Surgical History:   Procedure Laterality Date    ANKLE SURGERY Right     BLADDER SURGERY  2018    COLONOSCOPY      CORONARY STENT PLACEMENT      HERNIA REPAIR      right inguinal hernia    LASIK Bilateral     [place] TRANSURETHRAL ELEC-SURG PROSTATECTOM N/A 2018    Procedure: CYSTOSCOPY, TRANSURETHRAL RESECTION OF PROSTATE (TURP);   Surgeon: Jessica Mcgowan MD;  Location: [facility];  Service: Urology    SKIN CANCER EXCISION Left     arm and chest/melanoma    TONSILLECTOMY      WISDOM TOOTH EXTRACTION       Family History   Problem Relation Age of Onset    Heart disease Father     Hypertension Father     Coronary artery disease Father     Hyperlipidemia Father     Heart disease Maternal Aunt     Heart disease Maternal Uncle     Cancer Neg Hx     Diabetes Neg Hx     Stroke Neg Hx     Arthritis Neg Hx     Sudden death Neg Hx         cardiac       Social History     Tobacco Use   Smoking Status Former Smoker    Packs/day: 1 00    Years: 52 00    Pack years: 52 00    Types: Cigarettes    Last attempt to quit: 2018    Years since quittin 8   Smokeless Tobacco Never Used         Meds/Allergies     Current Outpatient Medications:     albuterol (PROVENTIL HFA,VENTOLIN HFA) 90 mcg/act inhaler, INHALE 2 PUFFS EVERY 4 (FOUR) HOURS AS NEEDED FOR WHEEZING OR SHORTNESS OF BREATH, Disp: 54 g, Rfl: 2    aspirin (ECOTRIN LOW STRENGTH) 81 mg EC tablet, Take 1 tablet (81 mg total) by mouth daily, Disp: 90 tablet, Rfl: 3    carvedilol (COREG CR) 20 MG 24 hr capsule, Take 20 mg by mouth daily, Disp: , Rfl:     cyanocobalamin (VITAMIN B-12) 1,000 mcg tablet, Take 1,000 mcg by mouth daily, Disp: , Rfl:     fluticasone-salmeterol (Wixela Inhub) 250-50 mcg/dose inhaler, Inhale 1 puff every 12 (twelve) hours Rinse mouth after use , Disp: 3 Inhaler, Rfl: 3    ibuprofen (MOTRIN) 200 mg tablet, Take by mouth every 6 (six) hours as needed for mild pain, Disp: , Rfl:     ipratropium-albuterol (DUO-NEB) 0 5-2 5 mg/3 mL nebulizer solution, Take 1 vial (3 mL total) by nebulization 5 (five) times a day, Disp: 450 vial, Rfl: 3    NASACORT ALLERGY 24HR 55 MCG/ACT AERO, , Disp: , Rfl:     OXYGEN-HELIUM IN, Inhale 3 liters NC continuously, Disp: , Rfl:     rosuvastatin (CRESTOR) 40 MG tablet, Take 1 tablet (40 mg total) by mouth daily, Disp: 90 tablet, Rfl: 2    sacubitril-valsartan (ENTRESTO) 24-26 MG TABS, Take 1 tablet by mouth 2 (two) times a day, Disp: 180 tablet, Rfl: 1    Cholecalciferol (D-400 PO), Take 1 tablet by mouth once a week, Disp: , Rfl:     cyclopentolate (CYCLOGYL) 1 % ophthalmic solution, , Disp: , Rfl:   No Known Allergies    Vitals: Blood pressure 110/60, pulse 92, temperature 98 °F (36 7 °C), temperature source Tympanic, resp  rate 18, height 5' 10" (1 778 m), weight 65 8 kg (145 lb), SpO2 99 %  Body mass index is 20 81 kg/m²  Oxygen Therapy  SpO2: 99 %  Oxygen Therapy: Supplemental oxygen  O2 Delivery Method: Nasal cannula  O2 Flow Rate (L/min): 3 L/min(pulse flow)    Physical Exam   Constitutional: He is oriented to person, place, and time  No distress  HENT:   Head: Normocephalic  Mouth/Throat: No oropharyngeal exudate  Eyes: Pupils are equal, round, and reactive to light  No scleral icterus  Neck: Neck supple  No JVD present  Cardiovascular: Normal rate and regular rhythm  Pulmonary/Chest: He has no wheezes  He has no rales  Decreased breath sounds throughout   Abdominal: Soft  There is no tenderness  Musculoskeletal: He exhibits no edema  Lymphadenopathy:     He has no cervical adenopathy  Neurological: He is alert and oriented to person, place, and time  Skin: Skin is warm and dry  Psychiatric: He has a normal mood and affect  Labs: I have personally reviewed pertinent lab results  Lab Results   Component Value Date    WBC 5 60 08/01/2018    HGB 12 2 10/08/2018    HCT 36 (L) 10/08/2018    MCV 97 08/01/2018     08/01/2018     Lab Results   Component Value Date    GLUCOSE 119 10/08/2018    CALCIUM 8 9 06/27/2019    K 3 9 06/27/2019    CO2 31 06/27/2019     06/27/2019    BUN 16 06/27/2019    CREATININE 1 00 06/27/2019     No results found for: IGE  Lab Results   Component Value Date    ALT 18 06/27/2019    AST 14 06/27/2019    ALKPHOS 96 06/27/2019     Imaging and other studies: I have personally reviewed pertinent reports  and I have personally reviewed pertinent films in PACS   CT of the chest from 7/19/19 shows severe diffuse emphysema in all lung zones  There is peripheral interstitial thickening  There is mild traction bronchiectasis  Pulmonary function testing:  Performed  4/19/19   FEV1/FVC ratio 32%   FEV1 19% predicted  FVC 45% predicted  No response to bronchodilators   % predicted   % predicted  DLCO corrected for hemoglobin 4 % predicted  PFT showed very severe obstruction with reduced vital capacity due to severe air trapping and severe diffusion impairment    Other Studies: I have personally reviewed pertinent reports      echocardiogram from 6/27/19 shows an EF of 20% and grade 1 diastolic dysfunction  Normal RV size and systolic function

## 2020-02-19 NOTE — ASSESSMENT & PLAN NOTE
Patient has advanced, very severe COPD with severe air trapping  Unfortunately, given pattern of emphysema, he is not a candidate for bronchoscopic lung volume reduction  There was brief conversation regarding possible lung transplantation, however given congestive heart failure, I do not believe he will be a candidate at this time  I will discuss further with Dr Beti Mcmillan whether it would be reasonable to pursue that option  For now, he will continue with generic Advair and DuoNeb    We will discontinue Spiriva as it has contributed to dry mouth and has not been beneficial

## 2020-02-19 NOTE — PROGRESS NOTES
Pulmonary Follow Up Note   Jovita Sawyer 69 y o  male MRN: [id number]  2/19/2020      Assessment/Plan:     Stage 4 very severe COPD by GOLD classification [facility])  Patient has advanced, very severe COPD with severe air trapping  Unfortunately, given pattern of emphysema, he is not a candidate for bronchoscopic lung volume reduction  There was brief conversation regarding possible lung transplantation, however given congestive heart failure, I do not believe he will be a candidate at this time  I will discuss further with Dr Luis Miguel Thacker whether it would be reasonable to pursue that option  For now, he will continue with generic Advair and DuoNeb  We will discontinue Spiriva as it has contributed to dry mouth and has not been beneficial     Chronic respiratory failure with hypoxia and hypercapnia (HCC)    Oxygenation is adequate on 3 L nasal cannula  Ischemic cardiomyopathy  Once we determine whether lung transplant is an option, patient can work with cardiology to decide on appropriate timing for ongoing cardiac workup and ICD placement  However, it seems that the patient will need to move forward with this option regardless of transplant decision  Addendum: Dr Luis Miguel Thacker  has reviewed the patient's information  Patient will not be a candidate for lung transplantation, however he will reach out to [facility] regarding whether he would be a candidate for heart/lung transplant, though unlikely given his age  Would favor moving forward with ICD placement    Visit orders:    Diagnoses and all orders for this visit:    Chronic respiratory failure with hypoxia and hypercapnia (HCC)    Stage 4 very severe COPD by GOLD classification ([address] )    Acute on chronic respiratory failure with hypoxia and hypercapnia (HCC)  -     fluticasone-salmeterol (Wixela Inhub) 250-50 mcg/dose inhaler; Inhale 1 puff every 12 (twelve) hours Rinse mouth after use      COPD with acute exacerbation ([address] )  -     fluticasone-salmeterol (Georgi Mcgrath) 250-50 mcg/dose inhaler; Inhale 1 puff every 12 (twelve) hours Rinse mouth after use  Ischemic cardiomyopathy        History of Present Illness   HPI:  Eliza Regan is a 69 y o  male who is here today for follow-up regarding severe COPD and chronic hypoxic and hypercapnic respiratory failure  He was seen by [facility] in August and was determined not to be a candidate for bronchoscopic lung volume reduction due to homogeneous emphysema  At that time, it was suggested that he pursue transplant evaluation  In the meantime, he saw cardiology last week who suggested placement of an ICD  The decision is pending conversation regarding lung transplant  Patient has shortness of breath with even minimal exertion  He is using oxygen around the clock  He has no significant cough, wheeze or sputum production  He was compliant with Wixela and DuoNeb 3-4 times per day  At his visit with [facility], he was started on Spiriva  He did not find to be beneficial   He has an extremely dry mouth and also some difficulties with slow urine    Review of Systems   Constitutional: Negative for chills, fever and unexpected weight change  HENT: Negative for postnasal drip and sore throat  Eyes: Negative for visual disturbance  Respiratory:        As noted in HPI   Cardiovascular: Negative for chest pain  Gastrointestinal: Negative for abdominal pain, diarrhea and vomiting  Genitourinary: Negative for difficulty urinating  Skin: Negative for rash  Neurological: Negative for headaches  Hematological: Negative for adenopathy  Psychiatric/Behavioral: Negative  All other systems reviewed and are negative          Historical Information   Past Medical History:   Diagnosis Date    Cancer ([facility] [address] )     melanoma left arm and left chest    Cataracts, bilateral     Cough with sputum     "once in awhile"    Dental bridge present     upper    Enlarged prostate     Heart attack ([address] )     Hiatal hernia     History of coronary artery stent placement     History of pneumonia     History of transfusion     "possibly 30 yrs ago or so"    Hyperlipidemia     Hypertension     Oxygen dependent     Pulmonary emphysema (HCC)     Shortness of breath     Urinary frequency     Urinary urgency      Past Surgical History:   Procedure Laterality Date    ANKLE SURGERY Right     BLADDER SURGERY  2018    COLONOSCOPY      CORONARY STENT PLACEMENT      HERNIA REPAIR      right inguinal hernia    LASIK Bilateral     [place] TRANSURETHRAL ELEC-SURG PROSTATECTOM N/A 2018    Procedure: CYSTOSCOPY, TRANSURETHRAL RESECTION OF PROSTATE (TURP);   Surgeon: Kd Segura MD;  Location: [facility];  Service: Urology    SKIN CANCER EXCISION Left     arm and chest/melanoma    TONSILLECTOMY      WISDOM TOOTH EXTRACTION       Family History   Problem Relation Age of Onset    Heart disease Father     Hypertension Father     Coronary artery disease Father     Hyperlipidemia Father     Heart disease Maternal Aunt     Heart disease Maternal Uncle     Cancer Neg Hx     Diabetes Neg Hx     Stroke Neg Hx     Arthritis Neg Hx     Sudden death Neg Hx         cardiac       Social History     Tobacco Use   Smoking Status Former Smoker    Packs/day: 1 00    Years: 52 00    Pack years: 52 00    Types: Cigarettes    Last attempt to quit: 2018    Years since quittin 8   Smokeless Tobacco Never Used         Meds/Allergies     Current Outpatient Medications:     albuterol (PROVENTIL HFA,VENTOLIN HFA) 90 mcg/act inhaler, INHALE 2 PUFFS EVERY 4 (FOUR) HOURS AS NEEDED FOR WHEEZING OR SHORTNESS OF BREATH, Disp: 54 g, Rfl: 2    aspirin (ECOTRIN LOW STRENGTH) 81 mg EC tablet, Take 1 tablet (81 mg total) by mouth daily, Disp: 90 tablet, Rfl: 3    carvedilol (COREG CR) 20 MG 24 hr capsule, Take 20 mg by mouth daily, Disp: , Rfl:     cyanocobalamin (VITAMIN B-12) 1,000 mcg tablet, Take 1,000 mcg by mouth daily, Disp: , Rfl:     fluticasone-salmeterol (Wixela Inhub) 250-50 mcg/dose inhaler, Inhale 1 puff every 12 (twelve) hours Rinse mouth after use , Disp: 3 Inhaler, Rfl: 3    ibuprofen (MOTRIN) 200 mg tablet, Take by mouth every 6 (six) hours as needed for mild pain, Disp: , Rfl:     ipratropium-albuterol (DUO-NEB) 0 5-2 5 mg/3 mL nebulizer solution, Take 1 vial (3 mL total) by nebulization 5 (five) times a day, Disp: 450 vial, Rfl: 3    NASACORT ALLERGY 24HR 55 MCG/ACT AERO, , Disp: , Rfl:     OXYGEN-HELIUM IN, Inhale 3 liters NC continuously, Disp: , Rfl:     rosuvastatin (CRESTOR) 40 MG tablet, Take 1 tablet (40 mg total) by mouth daily, Disp: 90 tablet, Rfl: 2    sacubitril-valsartan (ENTRESTO) 24-26 MG TABS, Take 1 tablet by mouth 2 (two) times a day, Disp: 180 tablet, Rfl: 1    Cholecalciferol (D-400 PO), Take 1 tablet by mouth once a week, Disp: , Rfl:     cyclopentolate (CYCLOGYL) 1 % ophthalmic solution, , Disp: , Rfl:   No Known Allergies    Vitals: Blood pressure 110/60, pulse 92, temperature 98 °F (36 7 °C), temperature source Tympanic, resp  rate 18, height 5' 10" (1 778 m), weight 65 8 kg (145 lb), SpO2 99 %  Body mass index is 20 81 kg/m²  Oxygen Therapy  SpO2: 99 %  Oxygen Therapy: Supplemental oxygen  O2 Delivery Method: Nasal cannula  O2 Flow Rate (L/min): 3 L/min(pulse flow)    Physical Exam   Constitutional: He is oriented to person, place, and time  No distress  HENT:   Head: Normocephalic  Mouth/Throat: No oropharyngeal exudate  Eyes: Pupils are equal, round, and reactive to light  No scleral icterus  Neck: Neck supple  No JVD present  Cardiovascular: Normal rate and regular rhythm  Pulmonary/Chest: He has no wheezes  He has no rales  Decreased breath sounds throughout   Abdominal: Soft  There is no tenderness  Musculoskeletal: He exhibits no edema  Lymphadenopathy:     He has no cervical adenopathy  Neurological: He is alert and oriented to person, place, and time     Skin: Skin is warm and dry  Psychiatric: He has a normal mood and affect  Labs: I have personally reviewed pertinent lab results  Lab Results   Component Value Date    WBC 5 60 08/01/2018    HGB 12 2 10/08/2018    HCT 36 (L) 10/08/2018    MCV 97 08/01/2018     08/01/2018     Lab Results   Component Value Date    GLUCOSE 119 10/08/2018    CALCIUM 8 9 06/27/2019    K 3 9 06/27/2019    CO2 31 06/27/2019     06/27/2019    BUN 16 06/27/2019    CREATININE 1 00 06/27/2019     No results found for: IGE  Lab Results   Component Value Date    ALT 18 06/27/2019    AST 14 06/27/2019    ALKPHOS 96 06/27/2019     Imaging and other studies: I have personally reviewed pertinent reports  and I have personally reviewed pertinent films in PACS   CT of the chest from 7/19/19 shows severe diffuse emphysema in all lung zones  There is peripheral interstitial thickening  There is mild traction bronchiectasis  Pulmonary function testing:  Performed  4/19/19   FEV1/FVC ratio 32%   FEV1 19% predicted  FVC 45% predicted  No response to bronchodilators   % predicted   % predicted  DLCO corrected for hemoglobin 4 % predicted  PFT showed very severe obstruction with reduced vital capacity due to severe air trapping and severe diffusion impairment    Other Studies: I have personally reviewed pertinent reports      echocardiogram from 6/27/19 shows an EF of 20% and grade 1 diastolic dysfunction  Normal RV size and systolic function

## 2020-03-10 ENCOUNTER — TELEPHONE (OUTPATIENT)
Dept: CARDIOLOGY CLINIC | Facility: CLINIC | Age: 75
End: 2020-03-10

## 2020-03-10 NOTE — TELEPHONE ENCOUNTER
Valeria Abreu, can you please schedule patient at [facility] or [facility] with Dr Vitaly Curry or Dr Luz Funes  Thanks  See messages below  If any questions please let me know

## 2020-03-10 NOTE — TELEPHONE ENCOUNTER
Can you please get this patient scheduled for Andrzej Rosario or Griselda Settle as soon as possible   Thanks

## 2020-03-10 NOTE — TELEPHONE ENCOUNTER
Patient saw pulmonary who advised to proceed with ICD placement  He has an appt with you the end of May  Do you want him seen by EP prior to your visit or can this wait until May?   Please advise

## 2020-03-10 NOTE — TELEPHONE ENCOUNTER
Just nancy kruse next EP doc available   Can get in soon at [place] if nothing available at [facility]

## 2020-03-16 ENCOUNTER — TELEPHONE (OUTPATIENT)
Dept: CARDIOLOGY CLINIC | Facility: CLINIC | Age: 75
End: 2020-03-16

## 2020-03-16 ENCOUNTER — CONSULT (OUTPATIENT)
Dept: CARDIOLOGY CLINIC | Facility: CLINIC | Age: 75
End: 2020-03-16
Payer: COMMERCIAL

## 2020-03-16 VITALS
HEIGHT: 70 IN | DIASTOLIC BLOOD PRESSURE: 62 MMHG | WEIGHT: 143.9 LBS | HEART RATE: 80 BPM | SYSTOLIC BLOOD PRESSURE: 100 MMHG | BODY MASS INDEX: 20.6 KG/M2

## 2020-03-16 DIAGNOSIS — I25.5 ISCHEMIC CARDIOMYOPATHY: Primary | ICD-10-CM

## 2020-03-16 DIAGNOSIS — E78.2 MIXED HYPERLIPIDEMIA: ICD-10-CM

## 2020-03-16 DIAGNOSIS — Z99.81 SUPPLEMENTAL OXYGEN DEPENDENT: ICD-10-CM

## 2020-03-16 DIAGNOSIS — Z87.891 HISTORY OF TOBACCO USE: ICD-10-CM

## 2020-03-16 DIAGNOSIS — I10 ESSENTIAL HYPERTENSION: ICD-10-CM

## 2020-03-16 DIAGNOSIS — I25.10 CORONARY ARTERY DISEASE INVOLVING NATIVE CORONARY ARTERY OF NATIVE HEART WITHOUT ANGINA PECTORIS: Chronic | ICD-10-CM

## 2020-03-16 DIAGNOSIS — I50.42 CHRONIC COMBINED SYSTOLIC AND DIASTOLIC CONGESTIVE HEART FAILURE (HCC): Chronic | ICD-10-CM

## 2020-03-16 DIAGNOSIS — J44.9 STAGE 4 VERY SEVERE COPD BY GOLD CLASSIFICATION (HCC): ICD-10-CM

## 2020-03-16 DIAGNOSIS — E78.5 DYSLIPIDEMIA: ICD-10-CM

## 2020-03-16 DIAGNOSIS — C43.61 MALIGNANT MELANOMA OF RIGHT UPPER EXTREMITY INCLUDING SHOULDER (HCC): ICD-10-CM

## 2020-03-16 PROCEDURE — 99205 OFFICE O/P NEW HI 60 MIN: CPT | Performed by: INTERNAL MEDICINE

## 2020-03-16 PROCEDURE — 1160F RVW MEDS BY RX/DR IN RCRD: CPT | Performed by: INTERNAL MEDICINE

## 2020-03-16 PROCEDURE — 93000 ELECTROCARDIOGRAM COMPLETE: CPT | Performed by: INTERNAL MEDICINE

## 2020-03-16 NOTE — LETTER
March 16, 2020     Debbie Ramirez MD  [address] [address]    Patient: Portia Naylor   YOB: 1945   Date of Visit: 3/16/2020       Dear Dr Susie Renae: Thank you for referring Portia Naylor to me for evaluation  Below are my notes for this consultation  If you have questions, please do not hesitate to call me  I look forward to following your patient along with you  Sincerely,        Magali Lugo MD        CC: Jo Ann Cerda DO  Portia Naylor  [place], [place]  Magali Lugo MD  3/16/2020  6:24 PM  Sign at close encounter                                             Cardiology Consultation     Portia Naylor  [id number]  1945  [place] [address]    1  Ischemic cardiomyopathy  POCT ECG   2  Stage 4 very severe COPD by GOLD classification ([facility] )     3  Coronary artery disease involving native coronary artery of native heart without angina pectoris     4  Chronic combined systolic and diastolic congestive heart failure ([address] )     5  Essential hypertension     6  History of tobacco use     7  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 shoulder (HCC)     8  Dyslipidemia     9  Mixed hyperlipidemia     10   Supplemental oxygen dependent         History of present illness    The patient has been referred by primary cardiologist Dr Susie Renae  for assessment of defibrillator    The patient has a long history of cardiomyopathy, LVEF of 20%  He has been on optimal medical therapy with carvedilol and entresto  for over a year  At the current time he is not complaining of anginal like chest pain or pressure  He does sleep on 2 pillows  He has prior history of PND  He is not complaining of palpitations  He is not complaining of presyncope or syncope  He does use water pill for his leg swelling  He does have significant exertional intolerance-can walk for 5 minutes    Patient has a very long history of smoking  1-1 and 1/2  pack per day for over 50 years  He has severe COPD and is on home oxygen  He had gone to [facility] for possible lung valve intervention-was refused as disease is diffuse    Patient does have a history of melanoma and has been operated  According to his history from prior physician it has been on the right hand  Patient also informs similar surgery on the left arm-there was however no lymph node dissection from the axilla    He has a history of hypertension, hyperlipidemia  He is on therapy for the same      Patient Active Problem List   Diagnosis    Chronic respiratory failure with hypoxia and hypercapnia ([address] )    Coronary artery disease without angina pectoris    History of tobacco use    Chronic combined systolic and diastolic congestive heart failure ([address] )    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 shoulder ([address] )    Frequency of urination    BPH with urinary obstruction    Urge incontinence    Benign prostatic hyperplasia with urinary retention    Stage 4 very severe COPD by GOLD classification ([address] )    Essential hypertension    Dyslipidemia    H/O heart artery stent    Mixed hyperlipidemia    Supplemental oxygen dependent    Ischemic cardiomyopathy     Past Medical History:   Diagnosis Date    Cancer ([address] )     melanoma left arm and left chest    Cataracts, bilateral     Cough with sputum     "once in awhile"    Dental bridge present     upper    Enlarged prostate     Heart attack ([address] )     Hiatal hernia     History of coronary artery stent placement     History of pneumonia     History of transfusion     "possibly 30 yrs ago or so"    Hyperlipidemia     Hypertension     Oxygen dependent     Pulmonary emphysema ([address] )     Shortness of breath     Urinary frequency     Urinary urgency      Social History     Socioeconomic History    Marital status: Single     Spouse name: Not on file    Number of children: Not on file    Years of education: Not on file    Highest education level: Not on file   Occupational History    Not on file   Social Needs    Financial resource strain: Not on file    Food insecurity:     Worry: Not on file     Inability: Not on file    Transportation needs:     Medical: Not on file     Non-medical: Not on file   Tobacco Use    Smoking status: Former Smoker     Packs/day: 1 00     Years: 52 00     Pack years: 52 00     Types: Cigarettes     Last attempt to quit: 2018     Years since quittin 8    Smokeless tobacco: Never Used   Substance and Sexual Activity    Alcohol use:  Yes     Alcohol/week: 2 0 standard drinks     Types: 2 Glasses of wine per week     Comment: 2 glasses of wine per week , heavier drinker years ago    Drug use: No    Sexual activity: Not on file   Lifestyle    Physical activity:     Days per week: Not on file     Minutes per session: Not on file    Stress: Not on file   Relationships    Social connections:     Talks on phone: Not on file     Gets together: Not on file     Attends Hinduism service: Not on file     Active member of club or organization: Not on file     Attends meetings of clubs or organizations: Not on file     Relationship status: Not on file    Intimate partner violence:     Fear of current or ex partner: Not on file     Emotionally abused: Not on file     Physically abused: Not on file     Forced sexual activity: Not on file   Other Topics Concern    Not on file   Social History Narrative    Not on file      Family History   Problem Relation Age of Onset    Heart disease Father     Hypertension Father     Coronary artery disease Father     Hyperlipidemia Father     Heart disease Maternal Aunt     Heart disease Maternal Uncle     Cancer Neg Hx     Diabetes Neg Hx     Stroke Neg Hx     Arthritis Neg Hx     Sudden death Neg Hx         cardiac     Past Surgical History:   Procedure Laterality Date    ANKLE SURGERY Right     BLADDER SURGERY 08/2018    COLONOSCOPY  2008    CORONARY STENT PLACEMENT  2008    HERNIA REPAIR      right inguinal hernia    LASIK Bilateral     NH TRANSURETHRAL ELEC-SURG PROSTATECTOM N/A 8/14/2018    Procedure: CYSTOSCOPY, TRANSURETHRAL RESECTION OF PROSTATE (TURP);   Surgeon: Jessica Mcgowan MD;  Location: [facility];  Service: Urology    SKIN CANCER EXCISION Left     arm and chest/melanoma    TONSILLECTOMY      WISDOM TOOTH EXTRACTION         Current Outpatient Medications:     albuterol (PROVENTIL HFA,VENTOLIN HFA) 90 mcg/act inhaler, INHALE 2 PUFFS EVERY 4 (FOUR) HOURS AS NEEDED FOR WHEEZING OR SHORTNESS OF BREATH, Disp: 54 g, Rfl: 2    aspirin (ECOTRIN LOW STRENGTH) 81 mg EC tablet, Take 1 tablet (81 mg total) by mouth daily, Disp: 90 tablet, Rfl: 3    carvedilol (COREG CR) 20 MG 24 hr capsule, Take 20 mg by mouth daily, Disp: , Rfl:     cyanocobalamin (VITAMIN B-12) 1,000 mcg tablet, Take 1,000 mcg by mouth daily, Disp: , Rfl:     cyclopentolate (CYCLOGYL) 1 % ophthalmic solution, , Disp: , Rfl:     fluticasone-salmeterol (Wixela Inhub) 250-50 mcg/dose inhaler, Inhale 1 puff every 12 (twelve) hours Rinse mouth after use , Disp: 3 Inhaler, Rfl: 3    ibuprofen (MOTRIN) 200 mg tablet, Take by mouth every 6 (six) hours as needed for mild pain, Disp: , Rfl:     ipratropium-albuterol (DUO-NEB) 0 5-2 5 mg/3 mL nebulizer solution, Take 1 vial (3 mL total) by nebulization 5 (five) times a day, Disp: 450 vial, Rfl: 3    NASACORT ALLERGY 24HR 55 MCG/ACT AERO, , Disp: , Rfl:     OXYGEN-HELIUM IN, Inhale 3 liters NC continuously, Disp: , Rfl:     rosuvastatin (CRESTOR) 40 MG tablet, Take 1 tablet (40 mg total) by mouth daily, Disp: 90 tablet, Rfl: 2    sacubitril-valsartan (ENTRESTO) 24-26 MG TABS, Take 1 tablet by mouth 2 (two) times a day, Disp: 180 tablet, Rfl: 1    Cholecalciferol (D-400 PO), Take 1 tablet by mouth once a week, Disp: , Rfl:   No Known Allergies  Vitals:    03/16/20 0821   BP: 100/62   BP Location: Left arm   Patient Position: Sitting   Cuff Size: Adult   Pulse: 80   Weight: 65 3 kg (143 lb 14 4 oz)   Height: 5' 10" (1 778 m)       Labs:  Lab Results   Component Value Date    K 3 9 06/27/2019     06/27/2019    CO2 31 06/27/2019    CO2 29 10/08/2018    BUN 16 06/27/2019    CREATININE 1 00 06/27/2019    GLUCOSE 119 10/08/2018    CALCIUM 8 9 06/27/2019     Lab Results   Component Value Date    TROPONINI <0 02 05/18/2018     Lab Results   Component Value Date    WBC 5 60 08/01/2018    HGB 12 2 10/08/2018    HGB 10 8 (L) 08/01/2018    HCT 36 (L) 10/08/2018    HCT 33 8 (L) 08/01/2018    MCV 97 08/01/2018     08/01/2018     Lab Results   Component Value Date    TRIG 68 06/27/2019    HDL 47 06/27/2019     Imaging: No results found  Review of Systems:  Review of Systems   All other systems reviewed and are negative  as described in my history of present illness        Physical Exam:  Physical Exam   Constitutional: He is oriented to person, place, and time  He appears well-developed  No distress  Not in any distress at the current time    Patient is thinly built  BMI is 20 6   HENT:   Head: Normocephalic and atraumatic  Right Ear: External ear normal    Left Ear: External ear normal    Nose: Nose normal    Mouth/Throat: Uvula is midline and mucous membranes are normal    Posterior pharynx is crowded   Eyes: Pupils are equal, round, and reactive to light  Conjunctivae, EOM and lids are normal  No scleral icterus  No pallor  No cyanosis  No icterus   Neck: Trachea normal and normal range of motion  Neck supple  No JVD present  Carotid bruit is not present  No thyromegaly present  No jugular lymphadenopathy   Cardiovascular: Normal rate, regular rhythm, S1 normal, S2 normal, intact distal pulses and normal pulses  PMI is not displaced  Exam reveals no gallop, no S3, no S4 and no friction rub  No murmur heard    Patient does have intermittent irregularity  EKG suggest bursts of PAT along with sinus rhythm   Pulmonary/Chest: Effort normal  No accessory muscle usage  No respiratory distress  He has decreased breath sounds in the right upper field, the right middle field, the right lower field, the left upper field, the left middle field and the left lower field  He has no wheezes  He has no rhonchi  He has no rales  He exhibits no tenderness  Abdominal: Soft  Normal appearance and bowel sounds are normal  He exhibits no distension and no mass  There is no splenomegaly or hepatomegaly  There is no tenderness  Musculoskeletal: Normal range of motion  He exhibits no edema, tenderness or deformity  Lymphadenopathy:     He has no cervical adenopathy  Neurological: He is alert and oriented to person, place, and time  Facial symmetry is retained  Extraocular movements are retained  Head neck tongue and palate movement are retained and symmetric   Skin: Skin is intact  No abrasion, no lesion and no rash noted  No erythema  Nails show no clubbing  Psychiatric: He has a normal mood and affect  His speech is normal and behavior is normal  Thought content normal    Vitals reviewed  Discussion/Summary:  1  CAD, ischemic cardiomyopathy  Systolic and diastolic heart failure  NYHA class 2-3  LVEF 20%  On optimal medical therapy more than 10 months  Shared decision making done with patient and primary physician      Patient is candidate for Bi V ICD  -RV ICD lead - for defibrillator function    - LV lead  -  PAF/PAT +  IVCD - patient is going to be greater than 40% V paced / 100% V paced if we do AV node ablation - needs LV lead    Atrial lead - needs higher dose of beta-blocker - has PAF/PAT - Will need atrial pacing      Set up for Bi V ICD  Left side  Use contrast  Use penicillin  Medtronic        2   Tobacco abuse  Very long history of tobacco abuse 1 and half pack per day for almost 50 years  Patient has mostly quit so occasionally Oz Somers is a cigarette  He is fully aware of the risk and benefits  Stressed on absolute abstinence      3  COPD  Very advanced-goal stage IV  Patient is on therapy per Pulmonary  Continue with the same      4  Hyperlipidemia  Patient currently on statin  He is tolerating the same well without any myositis or myalgia      5   Lung transplant denied  Patient has significant COPD  Diffuse emphysematous disease  He is no longer a candidate for lung transplantation          Summary of my recommendation for this patient  Medtronic Bi V ICD  Left side, use antibiotic, use contrast

## 2020-03-16 NOTE — PROGRESS NOTES
Cardiology Consultation     Brooklyn Ralph  [id number]  1945  [facility] [place]  [address] [facility] [address]    1  Ischemic cardiomyopathy  POCT ECG   2  Stage 4 very severe COPD by GOLD classification ([address] )     3  Coronary artery disease involving native coronary artery of native heart without angina pectoris     4  Chronic combined systolic and diastolic congestive heart failure ([address] )     5  Essential hypertension     6  History of tobacco use     7  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 shoulder (HCC)     8  Dyslipidemia     9  Mixed hyperlipidemia     10   Supplemental oxygen dependent         History of present illness    The patient has been referred by primary cardiologist Dr Linda Christiansen  for assessment of defibrillator    The patient has a long history of cardiomyopathy, LVEF of 20%  He has been on optimal medical therapy with carvedilol and entresto  for over a year  At the current time he is not complaining of anginal like chest pain or pressure  He does sleep on 2 pillows  He has prior history of PND  He is not complaining of palpitations  He is not complaining of presyncope or syncope  He does use water pill for his leg swelling  He does have significant exertional intolerance-can walk for 5 minutes    Patient has a very long history of smoking  1-1 and 1/2  pack per day for over 50 years  He has severe COPD and is on home oxygen  He had gone to [facility] for possible lung valve intervention-was refused as disease is diffuse    Patient does have a history of melanoma and has been operated  According to his history from prior physician it has been on the right hand  Patient also informs similar surgery on the left arm-there was however no lymph node dissection from the axilla    He has a history of hypertension, hyperlipidemia  He is on therapy for the same      Patient Active Problem List   Diagnosis    Chronic respiratory failure with hypoxia and hypercapnia (HCC)    Coronary artery disease without angina pectoris    History of tobacco use    Chronic combined systolic and diastolic congestive heart failure (HCC)    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 shoulder (HCC)    Frequency of urination    BPH with urinary obstruction    Urge incontinence    Benign prostatic hyperplasia with urinary retention    Stage 4 very severe COPD by GOLD classification ([facility] )    Essential hypertension    Dyslipidemia    H/O heart artery stent    Mixed hyperlipidemia    Supplemental oxygen dependent    Ischemic cardiomyopathy     Past Medical History:   Diagnosis Date    Cancer ([facility] )     melanoma left arm and left chest    Cataracts, bilateral     Cough with sputum     "once in awhile"    Dental bridge present     upper    Enlarged prostate     Heart attack ([facility] )     Hiatal hernia     History of coronary artery stent placement     History of pneumonia     History of transfusion     "possibly 30 yrs ago or so"    Hyperlipidemia     Hypertension     Oxygen dependent     Pulmonary emphysema (HCC)     Shortness of breath     Urinary frequency     Urinary urgency      Social History     Socioeconomic History    Marital status: Single     Spouse name: Not on file    Number of children: Not on file    Years of education: Not on file    Highest education level: Not on file   Occupational History    Not on file   Social Needs    Financial resource strain: Not on file    Food insecurity:     Worry: Not on file     Inability: Not on file    Transportation needs:     Medical: Not on file     Non-medical: Not on file   Tobacco Use    Smoking status: Former Smoker     Packs/day: 1 00     Years: 52 00     Pack years: 52 00     Types: Cigarettes     Last attempt to quit: 2018     Years since quittin 8    Smokeless tobacco: Never Used   Substance and Sexual Activity    Alcohol use: Yes     Alcohol/week: 2 0 standard drinks     Types: 2 Glasses of wine per week     Comment: 2 glasses of wine per week , heavier drinker years ago    Drug use: No    Sexual activity: Not on file   Lifestyle    Physical activity:     Days per week: Not on file     Minutes per session: Not on file    Stress: Not on file   Relationships    Social connections:     Talks on phone: Not on file     Gets together: Not on file     Attends Druze service: Not on file     Active member of club or organization: Not on file     Attends meetings of clubs or organizations: Not on file     Relationship status: Not on file    Intimate partner violence:     Fear of current or ex partner: Not on file     Emotionally abused: Not on file     Physically abused: Not on file     Forced sexual activity: Not on file   Other Topics Concern    Not on file   Social History Narrative    Not on file      Family History   Problem Relation Age of Onset    Heart disease Father     Hypertension Father     Coronary artery disease Father     Hyperlipidemia Father     Heart disease Maternal Aunt     Heart disease Maternal Uncle     Cancer Neg Hx     Diabetes Neg Hx     Stroke Neg Hx     Arthritis Neg Hx     Sudden death Neg Hx         cardiac     Past Surgical History:   Procedure Laterality Date    ANKLE SURGERY Right     BLADDER SURGERY  08/2018    COLONOSCOPY  2008    CORONARY STENT PLACEMENT  2008    HERNIA REPAIR      right inguinal hernia    LASIK Bilateral     ID TRANSURETHRAL ELEC-SURG PROSTATECTOM N/A 8/14/2018    Procedure: CYSTOSCOPY, TRANSURETHRAL RESECTION OF PROSTATE (TURP);   Surgeon: Janeen Fuentes MD;  Location: [facility] OR;  Service: Urology    SKIN CANCER EXCISION Left     arm and chest/melanoma    TONSILLECTOMY      WISDOM TOOTH EXTRACTION         Current Outpatient Medications:     albuterol (PROVENTIL HFA,VENTOLIN HFA) 90 mcg/act inhaler, INHALE 2 PUFFS EVERY 4 (FOUR) HOURS AS NEEDED FOR WHEEZING OR SHORTNESS OF BREATH, Disp: 54 g, Rfl: 2    aspirin (ECOTRIN LOW STRENGTH) 81 mg EC tablet, Take 1 tablet (81 mg total) by mouth daily, Disp: 90 tablet, Rfl: 3    carvedilol (COREG CR) 20 MG 24 hr capsule, Take 20 mg by mouth daily, Disp: , Rfl:     cyanocobalamin (VITAMIN B-12) 1,000 mcg tablet, Take 1,000 mcg by mouth daily, Disp: , Rfl:     cyclopentolate (CYCLOGYL) 1 % ophthalmic solution, , Disp: , Rfl:     fluticasone-salmeterol (Wixela Inhub) 250-50 mcg/dose inhaler, Inhale 1 puff every 12 (twelve) hours Rinse mouth after use , Disp: 3 Inhaler, Rfl: 3    ibuprofen (MOTRIN) 200 mg tablet, Take by mouth every 6 (six) hours as needed for mild pain, Disp: , Rfl:     ipratropium-albuterol (DUO-NEB) 0 5-2 5 mg/3 mL nebulizer solution, Take 1 vial (3 mL total) by nebulization 5 (five) times a day, Disp: 450 vial, Rfl: 3    NASACORT ALLERGY 24HR 55 MCG/ACT AERO, , Disp: , Rfl:     OXYGEN-HELIUM IN, Inhale 3 liters NC continuously, Disp: , Rfl:     rosuvastatin (CRESTOR) 40 MG tablet, Take 1 tablet (40 mg total) by mouth daily, Disp: 90 tablet, Rfl: 2    sacubitril-valsartan (ENTRESTO) 24-26 MG TABS, Take 1 tablet by mouth 2 (two) times a day, Disp: 180 tablet, Rfl: 1    Cholecalciferol (D-400 PO), Take 1 tablet by mouth once a week, Disp: , Rfl:   No Known Allergies  Vitals:    03/16/20 0821   BP: 100/62   BP Location: Left arm   Patient Position: Sitting   Cuff Size: Adult   Pulse: 80   Weight: 65 3 kg (143 lb 14 4 oz)   Height: 5' 10" (1 778 m)       Labs:  Lab Results   Component Value Date    K 3 9 06/27/2019     06/27/2019    CO2 31 06/27/2019    CO2 29 10/08/2018    BUN 16 06/27/2019    CREATININE 1 00 06/27/2019    GLUCOSE 119 10/08/2018    CALCIUM 8 9 06/27/2019     Lab Results   Component Value Date    TROPONINI <0 02 05/18/2018     Lab Results   Component Value Date    WBC 5 60 08/01/2018    HGB 12 2 10/08/2018    HGB 10 8 (L) 08/01/2018    HCT 36 (L) 10/08/2018 HCT 33 8 (L) 08/01/2018    MCV 97 08/01/2018     08/01/2018     Lab Results   Component Value Date    TRIG 68 06/27/2019    HDL 47 06/27/2019     Imaging: No results found  Review of Systems:  Review of Systems   All other systems reviewed and are negative  as described in my history of present illness        Physical Exam:  Physical Exam   Constitutional: He is oriented to person, place, and time  He appears well-developed  No distress  Not in any distress at the current time    Patient is thinly built  BMI is 20 6   HENT:   Head: Normocephalic and atraumatic  Right Ear: External ear normal    Left Ear: External ear normal    Nose: Nose normal    Mouth/Throat: Uvula is midline and mucous membranes are normal    Posterior pharynx is crowded   Eyes: Pupils are equal, round, and reactive to light  Conjunctivae, EOM and lids are normal  No scleral icterus  No pallor  No cyanosis  No icterus   Neck: Trachea normal and normal range of motion  Neck supple  No JVD present  Carotid bruit is not present  No thyromegaly present  No jugular lymphadenopathy   Cardiovascular: Normal rate, regular rhythm, S1 normal, S2 normal, intact distal pulses and normal pulses  PMI is not displaced  Exam reveals no gallop, no S3, no S4 and no friction rub  No murmur heard  Patient does have intermittent irregularity  EKG suggest bursts of PAT along with sinus rhythm   Pulmonary/Chest: Effort normal  No accessory muscle usage  No respiratory distress  He has decreased breath sounds in the right upper field, the right middle field, the right lower field, the left upper field, the left middle field and the left lower field  He has no wheezes  He has no rhonchi  He has no rales  He exhibits no tenderness  Abdominal: Soft  Normal appearance and bowel sounds are normal  He exhibits no distension and no mass  There is no splenomegaly or hepatomegaly  There is no tenderness  Musculoskeletal: Normal range of motion   He exhibits no edema, tenderness or deformity  Lymphadenopathy:     He has no cervical adenopathy  Neurological: He is alert and oriented to person, place, and time  Facial symmetry is retained  Extraocular movements are retained  Head neck tongue and palate movement are retained and symmetric   Skin: Skin is intact  No abrasion, no lesion and no rash noted  No erythema  Nails show no clubbing  Psychiatric: He has a normal mood and affect  His speech is normal and behavior is normal  Thought content normal    Vitals reviewed  Discussion/Summary:  1  CAD, ischemic cardiomyopathy  Systolic and diastolic heart failure  NYHA class 2-3  LVEF 20%  On optimal medical therapy more than 10 months  Shared decision making done with patient and primary physician      Patient is candidate for Bi V ICD  -RV ICD lead - for defibrillator function    - LV lead  -  PAF/PAT +  IVCD - patient is going to be greater than 40% V paced / 100% V paced if we do AV node ablation - needs LV lead    Atrial lead - needs higher dose of beta-blocker - has PAF/PAT - Will need atrial pacing      Set up for Bi V ICD  Left side  Use contrast  Use penicillin  Medtronic        2  Tobacco abuse  Very long history of tobacco abuse 1 and half pack per day for almost 50 years  Patient has mostly quit so occasionally Mary Kohli is a cigarette  He is fully aware of the risk and benefits  Stressed on absolute abstinence      3  COPD  Very advanced-goal stage IV  Patient is on therapy per Pulmonary  Continue with the same      4  Hyperlipidemia  Patient currently on statin  He is tolerating the same well without any myositis or myalgia      5   Lung transplant denied  Patient has significant COPD  Diffuse emphysematous disease  He is no longer a candidate for lung transplantation          Summary of my recommendation for this patient  Medtronic Bi V ICD  Left side, use antibiotic, use contrast

## 2020-03-16 NOTE — TELEPHONE ENCOUNTER
Patient schedule for BIV ICD implant at [facility] on 03/30/20 will be perform by Dr Vicky Toth  Patient aware of general instructions and blood test required prior procedure  Please Rolando Crespo can you check for insurance approval for this service

## 2020-03-23 ENCOUNTER — APPOINTMENT (OUTPATIENT)
Dept: LAB | Facility: CLINIC | Age: 75
End: 2020-03-23
Payer: COMMERCIAL

## 2020-03-23 DIAGNOSIS — J44.9 STAGE 4 VERY SEVERE COPD BY GOLD CLASSIFICATION (HCC): ICD-10-CM

## 2020-03-23 DIAGNOSIS — J44.1 COPD WITH ACUTE EXACERBATION (HCC): ICD-10-CM

## 2020-03-23 LAB
ANION GAP SERPL CALCULATED.3IONS-SCNC: 3 MMOL/L (ref 4–13)
BASOPHILS # BLD AUTO: 0.02 THOUSANDS/ΜL (ref 0–0.1)
BASOPHILS NFR BLD AUTO: 1 % (ref 0–1)
BUN SERPL-MCNC: 13 MG/DL (ref 5–25)
CALCIUM SERPL-MCNC: 8.7 MG/DL (ref 8.3–10.1)
CHLORIDE SERPL-SCNC: 103 MMOL/L (ref 100–108)
CO2 SERPL-SCNC: 32 MMOL/L (ref 21–32)
CREAT SERPL-MCNC: 0.9 MG/DL (ref 0.6–1.3)
EOSINOPHIL # BLD AUTO: 0.14 THOUSAND/ΜL (ref 0–0.61)
EOSINOPHIL NFR BLD AUTO: 3 % (ref 0–6)
ERYTHROCYTE [DISTWIDTH] IN BLOOD BY AUTOMATED COUNT: 15 % (ref 11.6–15.1)
GFR SERPL CREATININE-BSD FRML MDRD: 84 ML/MIN/1.73SQ M
GLUCOSE SERPL-MCNC: 68 MG/DL (ref 65–140)
HCT VFR BLD AUTO: 38.2 % (ref 36.5–49.3)
HGB BLD-MCNC: 11.7 G/DL (ref 12–17)
IMM GRANULOCYTES # BLD AUTO: 0 THOUSAND/UL (ref 0–0.2)
IMM GRANULOCYTES NFR BLD AUTO: 0 % (ref 0–2)
LYMPHOCYTES # BLD AUTO: 1.05 THOUSANDS/ΜL (ref 0.6–4.47)
LYMPHOCYTES NFR BLD AUTO: 26 % (ref 14–44)
MCH RBC QN AUTO: 32 PG (ref 26.8–34.3)
MCHC RBC AUTO-ENTMCNC: 30.6 G/DL (ref 31.4–37.4)
MCV RBC AUTO: 104 FL (ref 82–98)
MONOCYTES # BLD AUTO: 0.73 THOUSAND/ΜL (ref 0.17–1.22)
MONOCYTES NFR BLD AUTO: 18 % (ref 4–12)
NEUTROPHILS # BLD AUTO: 2.14 THOUSANDS/ΜL (ref 1.85–7.62)
NEUTS SEG NFR BLD AUTO: 52 % (ref 43–75)
NRBC BLD AUTO-RTO: 0 /100 WBCS
PLATELET # BLD AUTO: 153 THOUSANDS/UL (ref 149–390)
PMV BLD AUTO: 11.1 FL (ref 8.9–12.7)
POTASSIUM SERPL-SCNC: 3.7 MMOL/L (ref 3.5–5.3)
RBC # BLD AUTO: 3.66 MILLION/UL (ref 3.88–5.62)
SODIUM SERPL-SCNC: 138 MMOL/L (ref 136–145)
WBC # BLD AUTO: 4.08 THOUSAND/UL (ref 4.31–10.16)

## 2020-03-23 PROCEDURE — 80048 BASIC METABOLIC PNL TOTAL CA: CPT

## 2020-03-23 PROCEDURE — 85025 COMPLETE CBC W/AUTO DIFF WBC: CPT

## 2020-03-23 PROCEDURE — 36415 COLL VENOUS BLD VENIPUNCTURE: CPT

## 2020-03-24 DIAGNOSIS — I25.5 ISCHEMIC CARDIOMYOPATHY: Primary | ICD-10-CM

## 2020-03-24 RX ORDER — IPRATROPIUM BROMIDE AND ALBUTEROL SULFATE 2.5; .5 MG/3ML; MG/3ML
3 SOLUTION RESPIRATORY (INHALATION)
Qty: 450 VIAL | Refills: 3 | Status: SHIPPED | OUTPATIENT
Start: 2020-03-24 | End: 2020-04-03 | Stop reason: SDUPTHER

## 2020-03-24 RX ORDER — ALBUTEROL SULFATE 90 UG/1
2 AEROSOL, METERED RESPIRATORY (INHALATION) EVERY 4 HOURS PRN
Qty: 3 INHALER | Refills: 3 | Status: SHIPPED | OUTPATIENT
Start: 2020-03-24

## 2020-03-24 NOTE — TELEPHONE ENCOUNTER
He does not need auth for hsi Bi-V ICD on 3/30/20 at [address] at [facility]  Ref# [id number]

## 2020-03-27 NOTE — TELEPHONE ENCOUNTER
Pt is cancelled for ICD Implant per Dr José Spaulding and would like to reschedule for 1 to 2 months

## 2020-04-03 ENCOUNTER — TELEPHONE (OUTPATIENT)
Dept: OTHER | Facility: OTHER | Age: 75
End: 2020-04-03

## 2020-04-03 ENCOUNTER — DOCUMENTATION (OUTPATIENT)
Dept: PULMONOLOGY | Facility: CLINIC | Age: 75
End: 2020-04-03

## 2020-04-03 DIAGNOSIS — J44.9 STAGE 4 VERY SEVERE COPD BY GOLD CLASSIFICATION (HCC): ICD-10-CM

## 2020-04-03 DIAGNOSIS — E78.5 HYPERLIPIDEMIA, UNSPECIFIED HYPERLIPIDEMIA TYPE: ICD-10-CM

## 2020-04-03 DIAGNOSIS — I15.9 SECONDARY HYPERTENSION: Primary | ICD-10-CM

## 2020-04-03 RX ORDER — ROSUVASTATIN CALCIUM 40 MG/1
40 TABLET, COATED ORAL DAILY
Qty: 90 TABLET | Refills: 2 | Status: SHIPPED | OUTPATIENT
Start: 2020-04-03 | End: 2020-09-24 | Stop reason: SDUPTHER

## 2020-04-03 RX ORDER — CARVEDILOL PHOSPHATE 20 MG/1
20 CAPSULE, EXTENDED RELEASE ORAL DAILY
Qty: 90 CAPSULE | Refills: 2 | Status: ON HOLD | OUTPATIENT
Start: 2020-04-03 | End: 2020-06-18 | Stop reason: DRUGHIGH

## 2020-04-06 RX ORDER — IPRATROPIUM BROMIDE AND ALBUTEROL SULFATE 2.5; .5 MG/3ML; MG/3ML
3 SOLUTION RESPIRATORY (INHALATION)
Qty: 450 VIAL | Refills: 3 | Status: SHIPPED | OUTPATIENT
Start: 2020-04-06

## 2020-04-07 DIAGNOSIS — I10 ESSENTIAL HYPERTENSION: Primary | ICD-10-CM

## 2020-04-07 RX ORDER — CARVEDILOL 6.25 MG/1
6.25 TABLET ORAL 2 TIMES DAILY WITH MEALS
Qty: 180 TABLET | Refills: 3 | Status: ON HOLD | OUTPATIENT
Start: 2020-04-07 | End: 2020-06-19 | Stop reason: SDUPTHER

## 2020-05-20 DIAGNOSIS — I25.5 ISCHEMIC CARDIOMYOPATHY: Primary | ICD-10-CM

## 2020-05-20 NOTE — TELEPHONE ENCOUNTER
1  Do you presently have a fever or flu-like symptoms? No  2  Do you have symptoms of an upper respiratory infection like runny nose, sore throat, or cough? No  3  Are you suffering from new headache that you have not had in the past?  No  4  Do you have/have you experienced any new shortness of breath recently? No  5  Do you have any new diarrhea, nausea or vomiting? No  6  Have you been in contact with anyone who has been sick or diagnosed with COVID-19? No    Patient procedure reschedule (BIV ICD implant ) for 06/18/20 at [facility] with Dr Elizabeth Persons  Patient aware of general instructions and blood test require  patient need to have COVID test done on 06/11/20  Insurance approval done prior

## 2020-06-11 ENCOUNTER — APPOINTMENT (OUTPATIENT)
Dept: LAB | Facility: CLINIC | Age: 75
End: 2020-06-11
Payer: COMMERCIAL

## 2020-06-11 DIAGNOSIS — Z11.59 SCREENING FOR VIRAL DISEASE: ICD-10-CM

## 2020-06-11 LAB
ANION GAP SERPL CALCULATED.3IONS-SCNC: 0 MMOL/L (ref 4–13)
BASOPHILS # BLD AUTO: 0.02 THOUSANDS/ΜL (ref 0–0.1)
BASOPHILS NFR BLD AUTO: 1 % (ref 0–1)
BUN SERPL-MCNC: 14 MG/DL (ref 5–25)
CALCIUM SERPL-MCNC: 9 MG/DL (ref 8.3–10.1)
CHLORIDE SERPL-SCNC: 103 MMOL/L (ref 100–108)
CO2 SERPL-SCNC: 33 MMOL/L (ref 21–32)
CREAT SERPL-MCNC: 0.98 MG/DL (ref 0.6–1.3)
EOSINOPHIL # BLD AUTO: 0.18 THOUSAND/ΜL (ref 0–0.61)
EOSINOPHIL NFR BLD AUTO: 5 % (ref 0–6)
ERYTHROCYTE [DISTWIDTH] IN BLOOD BY AUTOMATED COUNT: 14.5 % (ref 11.6–15.1)
GFR SERPL CREATININE-BSD FRML MDRD: 76 ML/MIN/1.73SQ M
GLUCOSE P FAST SERPL-MCNC: 109 MG/DL (ref 65–99)
HCT VFR BLD AUTO: 39.8 % (ref 36.5–49.3)
HGB BLD-MCNC: 12.7 G/DL (ref 12–17)
IMM GRANULOCYTES # BLD AUTO: 0 THOUSAND/UL (ref 0–0.2)
IMM GRANULOCYTES NFR BLD AUTO: 0 % (ref 0–2)
LYMPHOCYTES # BLD AUTO: 1 THOUSANDS/ΜL (ref 0.6–4.47)
LYMPHOCYTES NFR BLD AUTO: 29 % (ref 14–44)
MCH RBC QN AUTO: 33.4 PG (ref 26.8–34.3)
MCHC RBC AUTO-ENTMCNC: 31.9 G/DL (ref 31.4–37.4)
MCV RBC AUTO: 105 FL (ref 82–98)
MONOCYTES # BLD AUTO: 0.55 THOUSAND/ΜL (ref 0.17–1.22)
MONOCYTES NFR BLD AUTO: 16 % (ref 4–12)
NEUTROPHILS # BLD AUTO: 1.76 THOUSANDS/ΜL (ref 1.85–7.62)
NEUTS SEG NFR BLD AUTO: 49 % (ref 43–75)
NRBC BLD AUTO-RTO: 0 /100 WBCS
PLATELET # BLD AUTO: 128 THOUSANDS/UL (ref 149–390)
PMV BLD AUTO: 11.1 FL (ref 8.9–12.7)
POTASSIUM SERPL-SCNC: 3.8 MMOL/L (ref 3.5–5.3)
RBC # BLD AUTO: 3.8 MILLION/UL (ref 3.88–5.62)
SODIUM SERPL-SCNC: 136 MMOL/L (ref 136–145)
WBC # BLD AUTO: 3.51 THOUSAND/UL (ref 4.31–10.16)

## 2020-06-11 PROCEDURE — 36415 COLL VENOUS BLD VENIPUNCTURE: CPT

## 2020-06-11 PROCEDURE — 85025 COMPLETE CBC W/AUTO DIFF WBC: CPT

## 2020-06-11 PROCEDURE — 80048 BASIC METABOLIC PNL TOTAL CA: CPT

## 2020-06-11 PROCEDURE — U0003 INFECTIOUS AGENT DETECTION BY NUCLEIC ACID (DNA OR RNA); SEVERE ACUTE RESPIRATORY SYNDROME CORONAVIRUS 2 (SARS-COV-2) (CORONAVIRUS DISEASE [COVID-19]), AMPLIFIED PROBE TECHNIQUE, MAKING USE OF HIGH THROUGHPUT TECHNOLOGIES AS DESCRIBED BY CMS-2020-01-R: HCPCS

## 2020-06-12 LAB — SARS-COV-2 RNA SPEC QL NAA+PROBE: NOT DETECTED

## 2020-06-17 ENCOUNTER — TELEPHONE (OUTPATIENT)
Dept: INPATIENT UNIT | Facility: HOSPITAL | Age: 75
End: 2020-06-17

## 2020-06-17 RX ORDER — CEFAZOLIN SODIUM 2 G/50ML
2000 SOLUTION INTRAVENOUS ONCE
Status: CANCELLED | OUTPATIENT
Start: 2020-06-17 | End: 2020-06-17

## 2020-06-18 ENCOUNTER — HOSPITAL ENCOUNTER (OUTPATIENT)
Dept: NON INVASIVE DIAGNOSTICS | Facility: HOSPITAL | Age: 75
Discharge: HOME | End: 2020-06-19
Attending: INTERNAL MEDICINE | Admitting: INTERNAL MEDICINE
Payer: COMMERCIAL

## 2020-06-18 ENCOUNTER — ANESTHESIA (OUTPATIENT)
Dept: NON INVASIVE DIAGNOSTICS | Facility: HOSPITAL | Age: 75
End: 2020-06-18
Payer: COMMERCIAL

## 2020-06-18 ENCOUNTER — ANESTHESIA EVENT (OUTPATIENT)
Dept: NON INVASIVE DIAGNOSTICS | Facility: HOSPITAL | Age: 75
End: 2020-06-18
Payer: COMMERCIAL

## 2020-06-18 ENCOUNTER — DOCUMENTATION (OUTPATIENT)
Dept: OTHER | Facility: HOSPITAL | Age: 75
End: 2020-06-18

## 2020-06-18 ENCOUNTER — APPOINTMENT (OUTPATIENT)
Dept: RADIOLOGY | Facility: HOSPITAL | Age: 75
End: 2020-06-18
Payer: COMMERCIAL

## 2020-06-18 DIAGNOSIS — I10 ESSENTIAL HYPERTENSION: ICD-10-CM

## 2020-06-18 DIAGNOSIS — I25.5 ISCHEMIC CARDIOMYOPATHY: ICD-10-CM

## 2020-06-18 LAB
ANION GAP SERPL CALCULATED.3IONS-SCNC: 2 MMOL/L (ref 4–13)
ATRIAL RATE: 88 BPM
ATRIAL RATE: 90 BPM
ATRIAL RATE: 96 BPM
BASOPHILS # BLD AUTO: 0.02 THOUSANDS/ΜL (ref 0–0.1)
BASOPHILS NFR BLD AUTO: 1 % (ref 0–1)
BUN SERPL-MCNC: 13 MG/DL (ref 5–25)
CALCIUM SERPL-MCNC: 9.1 MG/DL (ref 8.3–10.1)
CHLORIDE SERPL-SCNC: 101 MMOL/L (ref 100–108)
CO2 SERPL-SCNC: 33 MMOL/L (ref 21–32)
CREAT SERPL-MCNC: 0.82 MG/DL (ref 0.6–1.3)
EOSINOPHIL # BLD AUTO: 0.16 THOUSAND/ΜL (ref 0–0.61)
EOSINOPHIL NFR BLD AUTO: 4 % (ref 0–6)
ERYTHROCYTE [DISTWIDTH] IN BLOOD BY AUTOMATED COUNT: 14.3 % (ref 11.6–15.1)
GFR SERPL CREATININE-BSD FRML MDRD: 87 ML/MIN/1.73SQ M
GLUCOSE P FAST SERPL-MCNC: 84 MG/DL (ref 65–99)
GLUCOSE SERPL-MCNC: 84 MG/DL (ref 65–140)
HCT VFR BLD AUTO: 40.9 % (ref 36.5–49.3)
HGB BLD-MCNC: 13.1 G/DL (ref 12–17)
IMM GRANULOCYTES # BLD AUTO: 0.01 THOUSAND/UL (ref 0–0.2)
IMM GRANULOCYTES NFR BLD AUTO: 0 % (ref 0–2)
INR PPP: 1.05 (ref 0.84–1.19)
LYMPHOCYTES # BLD AUTO: 1.32 THOUSANDS/ΜL (ref 0.6–4.47)
LYMPHOCYTES NFR BLD AUTO: 32 % (ref 14–44)
MCH RBC QN AUTO: 33.3 PG (ref 26.8–34.3)
MCHC RBC AUTO-ENTMCNC: 32 G/DL (ref 31.4–37.4)
MCV RBC AUTO: 104 FL (ref 82–98)
MONOCYTES # BLD AUTO: 0.55 THOUSAND/ΜL (ref 0.17–1.22)
MONOCYTES NFR BLD AUTO: 13 % (ref 4–12)
NEUTROPHILS # BLD AUTO: 2.05 THOUSANDS/ΜL (ref 1.85–7.62)
NEUTS SEG NFR BLD AUTO: 50 % (ref 43–75)
NRBC BLD AUTO-RTO: 0 /100 WBCS
P AXIS: -8 DEGREES
P AXIS: 76 DEGREES
P AXIS: 78 DEGREES
PLATELET # BLD AUTO: 128 THOUSANDS/UL (ref 149–390)
PMV BLD AUTO: 11.3 FL (ref 8.9–12.7)
POTASSIUM SERPL-SCNC: 3.4 MMOL/L (ref 3.5–5.3)
PR INTERVAL: 160 MS
PR INTERVAL: 172 MS
PR INTERVAL: 204 MS
PROTHROMBIN TIME: 13.3 SECONDS (ref 11.6–14.5)
QRS AXIS: 88 DEGREES
QRS AXIS: 89 DEGREES
QRS AXIS: 92 DEGREES
QRSD INTERVAL: 110 MS
QRSD INTERVAL: 114 MS
QRSD INTERVAL: 90 MS
QT INTERVAL: 334 MS
QT INTERVAL: 364 MS
QT INTERVAL: 372 MS
QTC INTERVAL: 421 MS
QTC INTERVAL: 445 MS
QTC INTERVAL: 457 MS
RBC # BLD AUTO: 3.93 MILLION/UL (ref 3.88–5.62)
SODIUM SERPL-SCNC: 136 MMOL/L (ref 136–145)
T WAVE AXIS: -87 DEGREES
T WAVE AXIS: 265 DEGREES
T WAVE AXIS: 76 DEGREES
VENTRICULAR RATE: 90 BPM
VENTRICULAR RATE: 91 BPM
VENTRICULAR RATE: 96 BPM
WBC # BLD AUTO: 4.11 THOUSAND/UL (ref 4.31–10.16)

## 2020-06-18 PROCEDURE — 93010 ELECTROCARDIOGRAM REPORT: CPT | Performed by: INTERNAL MEDICINE

## 2020-06-18 PROCEDURE — 33249 INSJ/RPLCMT DEFIB W/LEAD(S): CPT | Performed by: INTERNAL MEDICINE

## 2020-06-18 PROCEDURE — C1730 CATH, EP, 19 OR FEW ELECT: HCPCS | Performed by: INTERNAL MEDICINE

## 2020-06-18 PROCEDURE — 85025 COMPLETE CBC W/AUTO DIFF WBC: CPT | Performed by: PHYSICIAN ASSISTANT

## 2020-06-18 PROCEDURE — 94760 N-INVAS EAR/PLS OXIMETRY 1: CPT

## 2020-06-18 PROCEDURE — C1769 GUIDE WIRE: HCPCS | Performed by: INTERNAL MEDICINE

## 2020-06-18 PROCEDURE — 85610 PROTHROMBIN TIME: CPT | Performed by: PHYSICIAN ASSISTANT

## 2020-06-18 PROCEDURE — 33225 L VENTRIC PACING LEAD ADD-ON: CPT | Performed by: INTERNAL MEDICINE

## 2020-06-18 PROCEDURE — 94640 AIRWAY INHALATION TREATMENT: CPT

## 2020-06-18 PROCEDURE — C1900 LEAD, CORONARY VENOUS: HCPCS

## 2020-06-18 PROCEDURE — 93005 ELECTROCARDIOGRAM TRACING: CPT

## 2020-06-18 PROCEDURE — C1892 INTRO/SHEATH,FIXED,PEEL-AWAY: HCPCS | Performed by: INTERNAL MEDICINE

## 2020-06-18 PROCEDURE — C1887 CATHETER, GUIDING: HCPCS | Performed by: INTERNAL MEDICINE

## 2020-06-18 PROCEDURE — NC001 PR NO CHARGE: Performed by: PHYSICIAN ASSISTANT

## 2020-06-18 PROCEDURE — C1898 LEAD, PMKR, OTHER THAN TRANS: HCPCS

## 2020-06-18 PROCEDURE — 71045 X-RAY EXAM CHEST 1 VIEW: CPT

## 2020-06-18 PROCEDURE — C1882 AICD, OTHER THAN SING/DUAL: HCPCS

## 2020-06-18 PROCEDURE — C1777 LEAD, AICD, ENDO SINGLE COIL: HCPCS

## 2020-06-18 PROCEDURE — 80048 BASIC METABOLIC PNL TOTAL CA: CPT | Performed by: PHYSICIAN ASSISTANT

## 2020-06-18 RX ORDER — SODIUM CHLORIDE 9 MG/ML
INJECTION, SOLUTION INTRAVENOUS CONTINUOUS PRN
Status: DISCONTINUED | OUTPATIENT
Start: 2020-06-18 | End: 2020-06-18 | Stop reason: SURG

## 2020-06-18 RX ORDER — IPRATROPIUM BROMIDE AND ALBUTEROL SULFATE 2.5; .5 MG/3ML; MG/3ML
3 SOLUTION RESPIRATORY (INHALATION) ONCE
Status: COMPLETED | OUTPATIENT
Start: 2020-06-18 | End: 2020-06-18

## 2020-06-18 RX ORDER — FLUTICASONE FUROATE AND VILANTEROL 100; 25 UG/1; UG/1
1 POWDER RESPIRATORY (INHALATION) DAILY
Status: DISCONTINUED | OUTPATIENT
Start: 2020-06-19 | End: 2020-06-19 | Stop reason: HOSPADM

## 2020-06-18 RX ORDER — IBUPROFEN 200 MG
200 TABLET ORAL EVERY 6 HOURS PRN
Status: DISCONTINUED | OUTPATIENT
Start: 2020-06-18 | End: 2020-06-19 | Stop reason: HOSPADM

## 2020-06-18 RX ORDER — ACETAMINOPHEN 325 MG/1
650 TABLET ORAL EVERY 4 HOURS PRN
Status: DISCONTINUED | OUTPATIENT
Start: 2020-06-18 | End: 2020-06-19 | Stop reason: HOSPADM

## 2020-06-18 RX ORDER — LEVALBUTEROL 1.25 MG/.5ML
1.25 SOLUTION, CONCENTRATE RESPIRATORY (INHALATION)
Status: DISCONTINUED | OUTPATIENT
Start: 2020-06-18 | End: 2020-06-18

## 2020-06-18 RX ORDER — FLUTICASONE PROPIONATE 50 MCG
1 SPRAY, SUSPENSION (ML) NASAL DAILY
Status: DISCONTINUED | OUTPATIENT
Start: 2020-06-18 | End: 2020-06-19 | Stop reason: HOSPADM

## 2020-06-18 RX ORDER — PROPOFOL 10 MG/ML
INJECTION, EMULSION INTRAVENOUS CONTINUOUS PRN
Status: DISCONTINUED | OUTPATIENT
Start: 2020-06-18 | End: 2020-06-18 | Stop reason: SURG

## 2020-06-18 RX ORDER — ASPIRIN 81 MG/1
81 TABLET ORAL DAILY
Status: DISCONTINUED | OUTPATIENT
Start: 2020-06-19 | End: 2020-06-19 | Stop reason: HOSPADM

## 2020-06-18 RX ORDER — LANOLIN ALCOHOL/MO/W.PET/CERES
3 CREAM (GRAM) TOPICAL
Status: DISCONTINUED | OUTPATIENT
Start: 2020-06-18 | End: 2020-06-19 | Stop reason: HOSPADM

## 2020-06-18 RX ORDER — ALBUTEROL SULFATE 90 UG/1
2 AEROSOL, METERED RESPIRATORY (INHALATION) EVERY 4 HOURS PRN
Status: DISCONTINUED | OUTPATIENT
Start: 2020-06-18 | End: 2020-06-19 | Stop reason: HOSPADM

## 2020-06-18 RX ORDER — LIDOCAINE HYDROCHLORIDE 10 MG/ML
INJECTION, SOLUTION EPIDURAL; INFILTRATION; INTRACAUDAL; PERINEURAL CODE/TRAUMA/SEDATION MEDICATION
Status: COMPLETED | OUTPATIENT
Start: 2020-06-18 | End: 2020-06-18

## 2020-06-18 RX ORDER — LEVALBUTEROL 1.25 MG/.5ML
1.25 SOLUTION, CONCENTRATE RESPIRATORY (INHALATION)
Status: DISCONTINUED | OUTPATIENT
Start: 2020-06-19 | End: 2020-06-19 | Stop reason: HOSPADM

## 2020-06-18 RX ORDER — FENTANYL CITRATE 50 UG/ML
INJECTION, SOLUTION INTRAMUSCULAR; INTRAVENOUS AS NEEDED
Status: DISCONTINUED | OUTPATIENT
Start: 2020-06-18 | End: 2020-06-18 | Stop reason: SURG

## 2020-06-18 RX ORDER — GENTAMICIN SULFATE 40 MG/ML
INJECTION, SOLUTION INTRAMUSCULAR; INTRAVENOUS CODE/TRAUMA/SEDATION MEDICATION
Status: COMPLETED | OUTPATIENT
Start: 2020-06-18 | End: 2020-06-18

## 2020-06-18 RX ORDER — ALBUTEROL SULFATE 2.5 MG/3ML
2.5 SOLUTION RESPIRATORY (INHALATION) EVERY 4 HOURS PRN
Status: DISCONTINUED | OUTPATIENT
Start: 2020-06-18 | End: 2020-06-19 | Stop reason: HOSPADM

## 2020-06-18 RX ORDER — FLUTICASONE PROPIONATE 50 MCG
1 SPRAY, SUSPENSION (ML) NASAL DAILY
COMMUNITY

## 2020-06-18 RX ORDER — IPRATROPIUM BROMIDE AND ALBUTEROL SULFATE 2.5; .5 MG/3ML; MG/3ML
3 SOLUTION RESPIRATORY (INHALATION)
Status: DISCONTINUED | OUTPATIENT
Start: 2020-06-18 | End: 2020-06-18

## 2020-06-18 RX ORDER — ATORVASTATIN CALCIUM 80 MG/1
80 TABLET, FILM COATED ORAL
Status: DISCONTINUED | OUTPATIENT
Start: 2020-06-18 | End: 2020-06-19 | Stop reason: HOSPADM

## 2020-06-18 RX ORDER — HYDROXYZINE HYDROCHLORIDE 25 MG/1
50 TABLET, FILM COATED ORAL EVERY 6 HOURS PRN
Status: DISCONTINUED | OUTPATIENT
Start: 2020-06-18 | End: 2020-06-19 | Stop reason: HOSPADM

## 2020-06-18 RX ORDER — CEFAZOLIN SODIUM 2 G/50ML
2000 SOLUTION INTRAVENOUS ONCE
Status: COMPLETED | OUTPATIENT
Start: 2020-06-18 | End: 2020-06-18

## 2020-06-18 RX ORDER — CARVEDILOL 6.25 MG/1
6.25 TABLET ORAL 2 TIMES DAILY WITH MEALS
Status: DISCONTINUED | OUTPATIENT
Start: 2020-06-18 | End: 2020-06-19 | Stop reason: HOSPADM

## 2020-06-18 RX ORDER — DOCUSATE SODIUM 100 MG/1
100 CAPSULE, LIQUID FILLED ORAL 2 TIMES DAILY PRN
Status: DISCONTINUED | OUTPATIENT
Start: 2020-06-18 | End: 2020-06-19 | Stop reason: HOSPADM

## 2020-06-18 RX ADMIN — ATORVASTATIN CALCIUM 80 MG: 80 TABLET, FILM COATED ORAL at 16:58

## 2020-06-18 RX ADMIN — PROPOFOL 15 MCG/KG/MIN: 10 INJECTION, EMULSION INTRAVENOUS at 08:09

## 2020-06-18 RX ADMIN — ACETAMINOPHEN 650 MG: 325 TABLET ORAL at 22:39

## 2020-06-18 RX ADMIN — CARVEDILOL 6.25 MG: 6.25 TABLET, FILM COATED ORAL at 16:58

## 2020-06-18 RX ADMIN — GENTAMICIN SULFATE 80 MG: 40 INJECTION, SOLUTION INTRAMUSCULAR; INTRAVENOUS at 09:09

## 2020-06-18 RX ADMIN — LEVALBUTEROL HYDROCHLORIDE 1.25 MG: 1.25 SOLUTION, CONCENTRATE RESPIRATORY (INHALATION) at 19:56

## 2020-06-18 RX ADMIN — Medication 2 MCG/MIN: at 08:08

## 2020-06-18 RX ADMIN — SODIUM CHLORIDE 4 MCG: 0.9 INJECTION, SOLUTION INTRAVENOUS at 08:08

## 2020-06-18 RX ADMIN — SODIUM CHLORIDE: 9 INJECTION, SOLUTION INTRAVENOUS at 08:04

## 2020-06-18 RX ADMIN — IOHEXOL 20 ML: 350 INJECTION, SOLUTION INTRAVENOUS at 08:30

## 2020-06-18 RX ADMIN — IPRATROPIUM BROMIDE 0.5 MG: 0.5 SOLUTION RESPIRATORY (INHALATION) at 19:56

## 2020-06-18 RX ADMIN — IPRATROPIUM BROMIDE AND ALBUTEROL SULFATE 3 ML: .5; 3 SOLUTION RESPIRATORY (INHALATION) at 12:15

## 2020-06-18 RX ADMIN — SACUBITRIL AND VALSARTAN 1 TABLET: 24; 26 TABLET, FILM COATED ORAL at 17:17

## 2020-06-18 RX ADMIN — ACETAMINOPHEN 650 MG: 325 TABLET ORAL at 14:40

## 2020-06-18 RX ADMIN — LIDOCAINE HYDROCHLORIDE 4 ML: 10 INJECTION, SOLUTION EPIDURAL; INFILTRATION; INTRACAUDAL; PERINEURAL at 08:26

## 2020-06-18 RX ADMIN — FENTANYL CITRATE 25 MCG: 50 INJECTION, SOLUTION INTRAMUSCULAR; INTRAVENOUS at 08:32

## 2020-06-18 RX ADMIN — CEFAZOLIN SODIUM 2000 MG: 2 SOLUTION INTRAVENOUS at 08:12

## 2020-06-18 RX ADMIN — LIDOCAINE HYDROCHLORIDE 26 ML: 10 INJECTION, SOLUTION EPIDURAL; INFILTRATION; INTRACAUDAL; PERINEURAL at 08:23

## 2020-06-18 RX ADMIN — LIDOCAINE HYDROCHLORIDE 10 ML: 10 INJECTION, SOLUTION EPIDURAL; INFILTRATION; INTRACAUDAL; PERINEURAL at 08:28

## 2020-06-18 RX ADMIN — IOHEXOL 15 ML: 350 INJECTION, SOLUTION INTRAVENOUS at 08:44

## 2020-06-18 RX ADMIN — IOHEXOL 10 ML: 350 INJECTION, SOLUTION INTRAVENOUS at 08:47

## 2020-06-18 RX ADMIN — ALBUTEROL SULFATE 2.5 MG: 2.5 SOLUTION RESPIRATORY (INHALATION) at 17:03

## 2020-06-19 ENCOUNTER — HOSPITAL ENCOUNTER (EMERGENCY)
Facility: HOSPITAL | Age: 75
Discharge: HOME | End: 2020-06-20
Attending: EMERGENCY MEDICINE | Admitting: EMERGENCY MEDICINE
Payer: COMMERCIAL

## 2020-06-19 ENCOUNTER — APPOINTMENT (OUTPATIENT)
Dept: RADIOLOGY | Facility: HOSPITAL | Age: 75
End: 2020-06-19
Payer: COMMERCIAL

## 2020-06-19 VITALS
DIASTOLIC BLOOD PRESSURE: 73 MMHG | RESPIRATION RATE: 19 BRPM | SYSTOLIC BLOOD PRESSURE: 113 MMHG | OXYGEN SATURATION: 98 % | HEART RATE: 90 BPM | TEMPERATURE: 97.7 F

## 2020-06-19 DIAGNOSIS — T81.31XA SURGICAL WOUND DEHISCENCE, INITIAL ENCOUNTER: Primary | ICD-10-CM

## 2020-06-19 LAB
ANION GAP SERPL CALCULATED.3IONS-SCNC: 5 MMOL/L (ref 4–13)
BUN SERPL-MCNC: 10 MG/DL (ref 5–25)
CALCIUM SERPL-MCNC: 8.9 MG/DL (ref 8.3–10.1)
CHLORIDE SERPL-SCNC: 103 MMOL/L (ref 100–108)
CO2 SERPL-SCNC: 29 MMOL/L (ref 21–32)
CREAT SERPL-MCNC: 0.62 MG/DL (ref 0.6–1.3)
ERYTHROCYTE [DISTWIDTH] IN BLOOD BY AUTOMATED COUNT: 14.3 % (ref 11.6–15.1)
GFR SERPL CREATININE-BSD FRML MDRD: 98 ML/MIN/1.73SQ M
GLUCOSE P FAST SERPL-MCNC: 77 MG/DL (ref 65–99)
GLUCOSE SERPL-MCNC: 77 MG/DL (ref 65–140)
HCT VFR BLD AUTO: 34.5 % (ref 36.5–49.3)
HGB BLD-MCNC: 11.2 G/DL (ref 12–17)
MCH RBC QN AUTO: 33.4 PG (ref 26.8–34.3)
MCHC RBC AUTO-ENTMCNC: 32.5 G/DL (ref 31.4–37.4)
MCV RBC AUTO: 103 FL (ref 82–98)
PLATELET # BLD AUTO: 93 THOUSANDS/UL (ref 149–390)
PMV BLD AUTO: 11.6 FL (ref 8.9–12.7)
POTASSIUM SERPL-SCNC: 3.9 MMOL/L (ref 3.5–5.3)
RBC # BLD AUTO: 3.35 MILLION/UL (ref 3.88–5.62)
SODIUM SERPL-SCNC: 137 MMOL/L (ref 136–145)
WBC # BLD AUTO: 3.56 THOUSAND/UL (ref 4.31–10.16)

## 2020-06-19 PROCEDURE — 94640 AIRWAY INHALATION TREATMENT: CPT

## 2020-06-19 PROCEDURE — 71046 X-RAY EXAM CHEST 2 VIEWS: CPT

## 2020-06-19 PROCEDURE — 99283 EMERGENCY DEPT VISIT LOW MDM: CPT

## 2020-06-19 PROCEDURE — 94760 N-INVAS EAR/PLS OXIMETRY 1: CPT

## 2020-06-19 PROCEDURE — 80048 BASIC METABOLIC PNL TOTAL CA: CPT | Performed by: PHYSICIAN ASSISTANT

## 2020-06-19 PROCEDURE — 85027 COMPLETE CBC AUTOMATED: CPT | Performed by: PHYSICIAN ASSISTANT

## 2020-06-19 PROCEDURE — 99024 POSTOP FOLLOW-UP VISIT: CPT | Performed by: INTERNAL MEDICINE

## 2020-06-19 RX ORDER — LIDOCAINE HYDROCHLORIDE AND EPINEPHRINE 10; 10 MG/ML; UG/ML
20 INJECTION, SOLUTION INFILTRATION; PERINEURAL ONCE
Status: COMPLETED | OUTPATIENT
Start: 2020-06-19 | End: 2020-06-19

## 2020-06-19 RX ORDER — CARVEDILOL 6.25 MG/1
6.25 TABLET ORAL 2 TIMES DAILY WITH MEALS
Qty: 180 TABLET | Refills: 2 | Status: SHIPPED | OUTPATIENT
Start: 2020-06-19 | End: 2020-07-08 | Stop reason: DRUGHIGH

## 2020-06-19 RX ORDER — LEVALBUTEROL 1.25 MG/.5ML
SOLUTION, CONCENTRATE RESPIRATORY (INHALATION)
Status: COMPLETED
Start: 2020-06-19 | End: 2020-06-19

## 2020-06-19 RX ADMIN — ASPIRIN 81 MG: 81 TABLET, COATED ORAL at 09:03

## 2020-06-19 RX ADMIN — CARVEDILOL 6.25 MG: 6.25 TABLET, FILM COATED ORAL at 06:55

## 2020-06-19 RX ADMIN — CYANOCOBALAMIN TAB 500 MCG 1000 MCG: 500 TAB at 09:03

## 2020-06-19 RX ADMIN — IPRATROPIUM BROMIDE 0.5 MG: 0.5 SOLUTION RESPIRATORY (INHALATION) at 07:14

## 2020-06-19 RX ADMIN — LIDOCAINE HYDROCHLORIDE,EPINEPHRINE BITARTRATE 20 ML: 10; .01 INJECTION, SOLUTION INFILTRATION; PERINEURAL at 23:50

## 2020-06-19 RX ADMIN — LEVALBUTEROL HYDROCHLORIDE 1.25 MG: 1.25 SOLUTION, CONCENTRATE RESPIRATORY (INHALATION) at 00:31

## 2020-06-19 RX ADMIN — ACETAMINOPHEN 650 MG: 325 TABLET ORAL at 04:39

## 2020-06-19 RX ADMIN — SACUBITRIL AND VALSARTAN 1 TABLET: 24; 26 TABLET, FILM COATED ORAL at 09:03

## 2020-06-19 RX ADMIN — IPRATROPIUM BROMIDE 0.5 MG: 0.5 SOLUTION RESPIRATORY (INHALATION) at 00:31

## 2020-06-19 RX ADMIN — LEVALBUTEROL HYDROCHLORIDE 1.25 MG: 1.25 SOLUTION, CONCENTRATE RESPIRATORY (INHALATION) at 07:14

## 2020-06-20 VITALS
SYSTOLIC BLOOD PRESSURE: 131 MMHG | TEMPERATURE: 98.7 F | DIASTOLIC BLOOD PRESSURE: 78 MMHG | OXYGEN SATURATION: 98 % | HEART RATE: 100 BPM | RESPIRATION RATE: 20 BRPM

## 2020-06-20 PROCEDURE — 99024 POSTOP FOLLOW-UP VISIT: CPT | Performed by: EMERGENCY MEDICINE

## 2020-06-20 RX ORDER — CEPHALEXIN 250 MG/1
500 CAPSULE ORAL ONCE
Status: COMPLETED | OUTPATIENT
Start: 2020-06-20 | End: 2020-06-20

## 2020-06-20 RX ORDER — CEPHALEXIN 500 MG/1
500 CAPSULE ORAL 4 TIMES DAILY
Qty: 28 CAPSULE | Refills: 0 | Status: SHIPPED | OUTPATIENT
Start: 2020-06-20 | End: 2020-06-27

## 2020-06-20 RX ADMIN — CEPHALEXIN 500 MG: 250 CAPSULE ORAL at 01:09

## 2020-06-30 DIAGNOSIS — J44.9 CHRONIC OBSTRUCTIVE PULMONARY DISEASE, UNSPECIFIED COPD TYPE (HCC): Primary | ICD-10-CM

## 2020-07-02 ENCOUNTER — IN-CLINIC DEVICE VISIT (OUTPATIENT)
Dept: CARDIOLOGY CLINIC | Facility: CLINIC | Age: 75
End: 2020-07-02

## 2020-07-02 DIAGNOSIS — Z95.810 AICD (AUTOMATIC CARDIOVERTER/DEFIBRILLATOR) PRESENT: Primary | ICD-10-CM

## 2020-07-02 PROCEDURE — 99024 POSTOP FOLLOW-UP VISIT: CPT | Performed by: INTERNAL MEDICINE

## 2020-07-08 RX ORDER — CARVEDILOL 6.25 MG/1
12.5 TABLET ORAL 2 TIMES DAILY WITH MEALS
COMMUNITY
End: 2020-09-24 | Stop reason: SDUPTHER

## 2020-07-14 ENCOUNTER — TRANSCRIBE ORDERS (OUTPATIENT)
Dept: LAB | Facility: CLINIC | Age: 75
End: 2020-07-14

## 2020-07-14 ENCOUNTER — APPOINTMENT (OUTPATIENT)
Dept: LAB | Facility: CLINIC | Age: 75
End: 2020-07-14
Payer: COMMERCIAL

## 2020-07-14 ENCOUNTER — IN-CLINIC DEVICE VISIT (OUTPATIENT)
Dept: CARDIOLOGY CLINIC | Facility: CLINIC | Age: 75
End: 2020-07-14

## 2020-07-14 DIAGNOSIS — Z95.810 BIVENTRICULAR IMPLANTABLE CARDIOVERTER-DEFIBRILLATOR IN SITU: Primary | ICD-10-CM

## 2020-07-14 DIAGNOSIS — Z11.59 NEED FOR HEPATITIS C SCREENING TEST: ICD-10-CM

## 2020-07-14 DIAGNOSIS — I25.10 CORONARY ARTERY DISEASE INVOLVING NATIVE CORONARY ARTERY OF NATIVE HEART WITHOUT ANGINA PECTORIS: Chronic | ICD-10-CM

## 2020-07-14 DIAGNOSIS — E78.2 MIXED HYPERLIPIDEMIA: ICD-10-CM

## 2020-07-14 DIAGNOSIS — I50.42 CHRONIC COMBINED SYSTOLIC AND DIASTOLIC CONGESTIVE HEART FAILURE (HCC): Chronic | ICD-10-CM

## 2020-07-14 DIAGNOSIS — I10 ESSENTIAL HYPERTENSION: ICD-10-CM

## 2020-07-14 LAB
ALBUMIN SERPL BCP-MCNC: 3.7 G/DL (ref 3.5–5)
ALP SERPL-CCNC: 81 U/L (ref 46–116)
ALT SERPL W P-5'-P-CCNC: 6 U/L (ref 12–78)
ANION GAP SERPL CALCULATED.3IONS-SCNC: 3 MMOL/L (ref 4–13)
AST SERPL W P-5'-P-CCNC: 14 U/L (ref 5–45)
BASOPHILS # BLD AUTO: 0.01 THOUSANDS/ΜL (ref 0–0.1)
BASOPHILS NFR BLD AUTO: 0 % (ref 0–1)
BILIRUB SERPL-MCNC: 0.65 MG/DL (ref 0.2–1)
BUN SERPL-MCNC: 17 MG/DL (ref 5–25)
CALCIUM SERPL-MCNC: 8.8 MG/DL (ref 8.3–10.1)
CHLORIDE SERPL-SCNC: 99 MMOL/L (ref 100–108)
CHOLEST SERPL-MCNC: 110 MG/DL (ref 50–200)
CO2 SERPL-SCNC: 32 MMOL/L (ref 21–32)
CREAT SERPL-MCNC: 0.82 MG/DL (ref 0.6–1.3)
EOSINOPHIL # BLD AUTO: 0.15 THOUSAND/ΜL (ref 0–0.61)
EOSINOPHIL NFR BLD AUTO: 5 % (ref 0–6)
ERYTHROCYTE [DISTWIDTH] IN BLOOD BY AUTOMATED COUNT: 14.8 % (ref 11.6–15.1)
GFR SERPL CREATININE-BSD FRML MDRD: 87 ML/MIN/1.73SQ M
GLUCOSE P FAST SERPL-MCNC: 84 MG/DL (ref 65–99)
HCT VFR BLD AUTO: 36.5 % (ref 36.5–49.3)
HCV AB SER QL: NORMAL
HDLC SERPL-MCNC: 44 MG/DL
HGB BLD-MCNC: 11.4 G/DL (ref 12–17)
IMM GRANULOCYTES # BLD AUTO: 0 THOUSAND/UL (ref 0–0.2)
IMM GRANULOCYTES NFR BLD AUTO: 0 % (ref 0–2)
LDLC SERPL CALC-MCNC: 54 MG/DL (ref 0–100)
LYMPHOCYTES # BLD AUTO: 1.01 THOUSANDS/ΜL (ref 0.6–4.47)
LYMPHOCYTES NFR BLD AUTO: 32 % (ref 14–44)
MCH RBC QN AUTO: 32.7 PG (ref 26.8–34.3)
MCHC RBC AUTO-ENTMCNC: 31.2 G/DL (ref 31.4–37.4)
MCV RBC AUTO: 105 FL (ref 82–98)
MONOCYTES # BLD AUTO: 0.56 THOUSAND/ΜL (ref 0.17–1.22)
MONOCYTES NFR BLD AUTO: 18 % (ref 4–12)
NEUTROPHILS # BLD AUTO: 1.4 THOUSANDS/ΜL (ref 1.85–7.62)
NEUTS SEG NFR BLD AUTO: 45 % (ref 43–75)
NRBC BLD AUTO-RTO: 0 /100 WBCS
PLATELET # BLD AUTO: 99 THOUSANDS/UL (ref 149–390)
PMV BLD AUTO: 11.3 FL (ref 8.9–12.7)
POTASSIUM SERPL-SCNC: 4.4 MMOL/L (ref 3.5–5.3)
PROT SERPL-MCNC: 6.9 G/DL (ref 6.4–8.2)
RBC # BLD AUTO: 3.49 MILLION/UL (ref 3.88–5.62)
SODIUM SERPL-SCNC: 134 MMOL/L (ref 136–145)
TRIGL SERPL-MCNC: 60 MG/DL
WBC # BLD AUTO: 3.13 THOUSAND/UL (ref 4.31–10.16)

## 2020-07-14 PROCEDURE — 86803 HEPATITIS C AB TEST: CPT

## 2020-07-14 PROCEDURE — 80061 LIPID PANEL: CPT

## 2020-07-14 PROCEDURE — RECHECK

## 2020-07-14 PROCEDURE — 36415 COLL VENOUS BLD VENIPUNCTURE: CPT

## 2020-07-14 PROCEDURE — 80053 COMPREHEN METABOLIC PANEL: CPT

## 2020-07-14 PROCEDURE — 85025 COMPLETE CBC W/AUTO DIFF WBC: CPT

## 2020-07-14 NOTE — PROGRESS NOTES
Results for orders placed or performed in visit on 07/14/20   Cardiac EP device report    Narrative    MDT BI-V ICD/ ACTIVE SYSTEM IS MRI CONDITIONAL  DEVICE INTERROGATED IN THE [place] OFFICE: N/B- REPROGRAM PER SN (NO TEST)  BATTERY VOLTAGE ADEQUATE (9 7 yrs)  AP 2 3% BVP 95 4% ( 91 2% + VSR PACE 4 2%)  ALL AVAILABLE LEAD PARAMETERS WITHIN NORMAL LIMITS  NO SIGNIFICANT HIGH RATE EPISODES  5 VENTRICULAR SENSE EPISODES W/MARKERS FOR FUSION, PAT, PVC'S  OPTI-VOL WITHIN NORMAL LIMITS  LRL INCREASED TO 70 BPM AS PER TASK/SN  APPROPRIATELY FUNCTIONING BI-V ICD      EB

## 2020-07-20 ENCOUNTER — OFFICE VISIT (OUTPATIENT)
Dept: INTERNAL MEDICINE CLINIC | Facility: CLINIC | Age: 75
End: 2020-07-20
Payer: COMMERCIAL

## 2020-07-20 VITALS
WEIGHT: 133 LBS | RESPIRATION RATE: 18 BRPM | BODY MASS INDEX: 19.04 KG/M2 | HEIGHT: 70 IN | HEART RATE: 81 BPM | OXYGEN SATURATION: 99 % | TEMPERATURE: 98.2 F

## 2020-07-20 DIAGNOSIS — Z99.81 SUPPLEMENTAL OXYGEN DEPENDENT: ICD-10-CM

## 2020-07-20 DIAGNOSIS — E78.2 MIXED HYPERLIPIDEMIA: ICD-10-CM

## 2020-07-20 DIAGNOSIS — I50.42 CHRONIC COMBINED SYSTOLIC AND DIASTOLIC CONGESTIVE HEART FAILURE (HCC): Chronic | ICD-10-CM

## 2020-07-20 DIAGNOSIS — I10 ESSENTIAL HYPERTENSION: ICD-10-CM

## 2020-07-20 DIAGNOSIS — D69.6 THROMBOCYTOPENIA (HCC): Primary | ICD-10-CM

## 2020-07-20 DIAGNOSIS — I25.10 CORONARY ARTERY DISEASE INVOLVING NATIVE CORONARY ARTERY OF NATIVE HEART WITHOUT ANGINA PECTORIS: Chronic | ICD-10-CM

## 2020-07-20 DIAGNOSIS — J44.9 STAGE 4 VERY SEVERE COPD BY GOLD CLASSIFICATION (HCC): ICD-10-CM

## 2020-07-20 PROCEDURE — 3008F BODY MASS INDEX DOCD: CPT | Performed by: INTERNAL MEDICINE

## 2020-07-20 PROCEDURE — 3078F DIAST BP <80 MM HG: CPT | Performed by: INTERNAL MEDICINE

## 2020-07-20 PROCEDURE — 3075F SYST BP GE 130 - 139MM HG: CPT | Performed by: INTERNAL MEDICINE

## 2020-07-20 PROCEDURE — 1160F RVW MEDS BY RX/DR IN RCRD: CPT | Performed by: INTERNAL MEDICINE

## 2020-07-20 PROCEDURE — 1036F TOBACCO NON-USER: CPT | Performed by: INTERNAL MEDICINE

## 2020-07-20 PROCEDURE — 4040F PNEUMOC VAC/ADMIN/RCVD: CPT | Performed by: INTERNAL MEDICINE

## 2020-07-20 PROCEDURE — 99214 OFFICE O/P EST MOD 30 MIN: CPT | Performed by: INTERNAL MEDICINE

## 2020-07-20 NOTE — PROGRESS NOTES
Assessment/Plan:     Diagnoses and all orders for this visit:    Thrombocytopenia ([facility] )  Comments:  noted since March on BW  Platelets ~522V at this time and no significant bleeding  BW ordered and precautions advised  t/c Heme eval  Orders:  -     Vitamin B12; Future  -     Heparin-induced platelet antibody; Future  -     CBC and differential; Future  -     TSH, 3rd generation with Free T4 reflex; Future    Chronic combined systolic and diastolic congestive heart failure (HCC)  Comments:  stable, s/p BiV-ICD   c/w BB/asa/ARNI/statin and f/u cardiology  Orders:  -     TSH, 3rd generation with Free T4 reflex; Future    Coronary artery disease involving native coronary artery of native heart without angina pectoris  Comments:  no symtpoms, c/w BB/asa/ARNI/statin and f/u cardiology  may need to stop aspirin due to lower platelet count(w/u in progress, see above)    Stage 4 very severe COPD by GOLD classification ([facility] )  Comments:  stable, c/w supplemental oxygen, advair, duoneb and f/u pulmonary    Essential hypertension  Comments:  BP doing well, c/w BB/ARNI    Mixed hyperlipidemia  Comments:  taking statin and no SE, c/w rx  Orders:  -     TSH, 3rd generation with Free T4 reflex; Future    Supplemental oxygen dependent          Subjective:      Patient ID: Brandt Ledezma is a 77 y o  male  HPI    Here for follow up, Mai Rivas is clinically stable  History of CHF/COPD stage 4 on 3 L/min supplemental oxygen at all times  He recently had placement of BiV-ICD last month, and lost 10 or more lbs after this  He reports having less leg swelling and is now having a good appetite again and gained some weight back  Reviewed current meds with Mai Rivas and his most recent BW results  He reports if accidentally hitting his arm on something -> easy bruising but no bleeding symptoms    His portable oxygen tank is running low during today's appt and he declines to use supplemental tank we have at the office as he can re-charge his portable tank in his car  Denies vegetarian diet  ROS Otherwise negative, no other complaints  Past Medical History:   Diagnosis Date    Cancer [facility])     melanoma left arm and left chest    Cataracts, bilateral     Cough with sputum     "once in awhile"    Dental bridge present     upper    Enlarged prostate     Heart attack ([address] )     Hiatal hernia     History of coronary artery stent placement     History of pneumonia     History of transfusion     "possibly 30 yrs ago or so"    Hyperlipidemia     Hypertension     Oxygen dependent     Pulmonary emphysema (HCC)     Shortness of breath     Urinary frequency     Urinary urgency      Vitals:    07/20/20 0837   Pulse: 81   Resp: 18   Temp: 98 2 °F (36 8 °C)   SpO2: 99%   Weight: 60 3 kg (133 lb)   Height: 5' 10" (1 778 m)     Body mass index is 19 08 kg/m²      Current Outpatient Medications:     albuterol (PROVENTIL HFA,VENTOLIN HFA) 90 mcg/act inhaler, Inhale 2 puffs every 4 (four) hours as needed for wheezing or shortness of breath, Disp: 3 Inhaler, Rfl: 3    aspirin (ECOTRIN LOW STRENGTH) 81 mg EC tablet, Take 1 tablet (81 mg total) by mouth daily, Disp: 90 tablet, Rfl: 3    carvedilol (COREG) 6 25 mg tablet, Take 12 5 mg by mouth 2 (two) times a day with meals, Disp: , Rfl:     Cholecalciferol (D-400 PO), Take 1 tablet by mouth once a week, Disp: , Rfl:     cyanocobalamin (VITAMIN B-12) 1,000 mcg tablet, Take 1,000 mcg by mouth daily, Disp: , Rfl:     fluticasone (FLONASE) 50 mcg/act nasal spray, 1 spray into each nostril daily, Disp: , Rfl:     fluticasone-salmeterol (ADVAIR, WIXELA) 100-50 mcg/dose inhaler, Inhale 1 puff 2 (two) times a day Rinse mouth after use , Disp: 3 Inhaler, Rfl: 3    ibuprofen (MOTRIN) 200 mg tablet, Take by mouth every 6 (six) hours as needed for mild pain, Disp: , Rfl:     ipratropium-albuterol (DUO-NEB) 0 5-2 5 mg/3 mL nebulizer solution, Take 1 vial (3 mL total) by nebulization 5 (five) times a day, Disp: 450 vial, Rfl: 3    OXYGEN-HELIUM IN, Inhale 3 liters NC continuously, Disp: , Rfl:     rosuvastatin (CRESTOR) 40 MG tablet, Take 1 tablet (40 mg total) by mouth daily, Disp: 90 tablet, Rfl: 2    sacubitril-valsartan (ENTRESTO) 24-26 MG TABS, Take 1 tablet by mouth 2 (two) times a day, Disp: 180 tablet, Rfl: 1  No Known Allergies      Review of Systems   Constitutional: Negative for fever  HENT: Negative for congestion  Eyes: Negative for visual disturbance  Respiratory: Negative for shortness of breath  Cardiovascular: Negative for chest pain  Gastrointestinal: Negative for abdominal pain  Endocrine: Negative for polyuria  Genitourinary: Negative for dysuria  Musculoskeletal: Negative for arthralgias  Skin: Negative for rash  Allergic/Immunologic: Negative for immunocompromised state  Neurological: Negative for dizziness  Hematological: Bruises/bleeds easily (easy bruising but no bleeding)  Psychiatric/Behavioral: Negative for dysphoric mood  Objective:      Pulse 81   Temp 98 2 °F (36 8 °C)   Resp 18   Ht 5' 10" (1 778 m)   Wt 60 3 kg (133 lb)   SpO2 99%   BMI 19 08 kg/m²          Physical Exam   Constitutional: He appears well-developed and well-nourished  Using supplemental oxygen at 3L/min via NC   HENT:   Head: Normocephalic and atraumatic  Eyes: Pupils are equal, round, and reactive to light  Cardiovascular: Normal rate and regular rhythm  No murmur heard  Pulmonary/Chest: Effort normal and breath sounds normal  He has no wheezes  He has no rales  Abdominal: Soft  Bowel sounds are normal  There is no tenderness  Musculoskeletal: He exhibits no edema  Neurological: He is alert  Psychiatric: He has a normal mood and affect  His behavior is normal    Vitals reviewed        Results for orders placed or performed in visit on 07/14/20   Hepatitis C antibody   Result Value Ref Range    Hepatitis C Ab Non-reactive Non-reactive   Comprehensive metabolic panel   Result Value Ref Range    Sodium 134 (L) 136 - 145 mmol/L    Potassium 4 4 3 5 - 5 3 mmol/L    Chloride 99 (L) 100 - 108 mmol/L    CO2 32 21 - 32 mmol/L    ANION GAP 3 (L) 4 - 13 mmol/L    BUN 17 5 - 25 mg/dL    Creatinine 0 82 0 60 - 1 30 mg/dL    Glucose, Fasting 84 65 - 99 mg/dL    Calcium 8 8 8 3 - 10 1 mg/dL    AST 14 5 - 45 U/L    ALT 6 (L) 12 - 78 U/L    Alkaline Phosphatase 81 46 - 116 U/L    Total Protein 6 9 6 4 - 8 2 g/dL    Albumin 3 7 3 5 - 5 0 g/dL    Total Bilirubin 0 65 0 20 - 1 00 mg/dL    eGFR 87 ml/min/1 73sq m   Lipid Panel with Direct LDL reflex   Result Value Ref Range    Cholesterol 110 50 - 200 mg/dL    Triglycerides 60 <=150 mg/dL    HDL, Direct 44 >=40 mg/dL    LDL Calculated 54 0 - 100 mg/dL   CBC and differential   Result Value Ref Range    WBC 3 13 (L) 4 31 - 10 16 Thousand/uL    RBC 3 49 (L) 3 88 - 5 62 Million/uL    Hemoglobin 11 4 (L) 12 0 - 17 0 g/dL    Hematocrit 36 5 36 5 - 49 3 %     (H) 82 - 98 fL    MCH 32 7 26 8 - 34 3 pg    MCHC 31 2 (L) 31 4 - 37 4 g/dL    RDW 14 8 11 6 - 15 1 %    MPV 11 3 8 9 - 12 7 fL    Platelets 99 (L) 887 - 390 Thousands/uL    nRBC 0 /100 WBCs    Neutrophils Relative 45 43 - 75 %    Immat GRANS % 0 0 - 2 %    Lymphocytes Relative 32 14 - 44 %    Monocytes Relative 18 (H) 4 - 12 %    Eosinophils Relative 5 0 - 6 %    Basophils Relative 0 0 - 1 %    Neutrophils Absolute 1 40 (L) 1 85 - 7 62 Thousands/µL    Immature Grans Absolute 0 00 0 00 - 0 20 Thousand/uL    Lymphocytes Absolute 1 01 0 60 - 4 47 Thousands/µL    Monocytes Absolute 0 56 0 17 - 1 22 Thousand/µL    Eosinophils Absolute 0 15 0 00 - 0 61 Thousand/µL    Basophils Absolute 0 01 0 00 - 0 10 Thousands/µL

## 2020-08-01 ENCOUNTER — APPOINTMENT (OUTPATIENT)
Dept: LAB | Facility: CLINIC | Age: 75
End: 2020-08-01
Payer: COMMERCIAL

## 2020-08-01 DIAGNOSIS — I50.42 CHRONIC COMBINED SYSTOLIC AND DIASTOLIC CONGESTIVE HEART FAILURE (HCC): Chronic | ICD-10-CM

## 2020-08-01 DIAGNOSIS — D69.6 THROMBOCYTOPENIA (HCC): ICD-10-CM

## 2020-08-01 DIAGNOSIS — E78.2 MIXED HYPERLIPIDEMIA: ICD-10-CM

## 2020-08-01 LAB
BASOPHILS # BLD AUTO: 0.01 THOUSANDS/ΜL (ref 0–0.1)
BASOPHILS NFR BLD AUTO: 0 % (ref 0–1)
EOSINOPHIL # BLD AUTO: 0.15 THOUSAND/ΜL (ref 0–0.61)
EOSINOPHIL NFR BLD AUTO: 5 % (ref 0–6)
ERYTHROCYTE [DISTWIDTH] IN BLOOD BY AUTOMATED COUNT: 14.9 % (ref 11.6–15.1)
HCT VFR BLD AUTO: 36.5 % (ref 36.5–49.3)
HGB BLD-MCNC: 11.5 G/DL (ref 12–17)
IMM GRANULOCYTES # BLD AUTO: 0.01 THOUSAND/UL (ref 0–0.2)
IMM GRANULOCYTES NFR BLD AUTO: 0 % (ref 0–2)
LYMPHOCYTES # BLD AUTO: 0.94 THOUSANDS/ΜL (ref 0.6–4.47)
LYMPHOCYTES NFR BLD AUTO: 33 % (ref 14–44)
MCH RBC QN AUTO: 33.2 PG (ref 26.8–34.3)
MCHC RBC AUTO-ENTMCNC: 31.5 G/DL (ref 31.4–37.4)
MCV RBC AUTO: 106 FL (ref 82–98)
MONOCYTES # BLD AUTO: 0.39 THOUSAND/ΜL (ref 0.17–1.22)
MONOCYTES NFR BLD AUTO: 14 % (ref 4–12)
NEUTROPHILS # BLD AUTO: 1.32 THOUSANDS/ΜL (ref 1.85–7.62)
NEUTS SEG NFR BLD AUTO: 48 % (ref 43–75)
NRBC BLD AUTO-RTO: 0 /100 WBCS
PLATELET # BLD AUTO: 104 THOUSANDS/UL (ref 149–390)
PMV BLD AUTO: 11.1 FL (ref 8.9–12.7)
RBC # BLD AUTO: 3.46 MILLION/UL (ref 3.88–5.62)
TSH SERPL DL<=0.05 MIU/L-ACNC: 1.31 UIU/ML (ref 0.36–3.74)
VIT B12 SERPL-MCNC: 310 PG/ML (ref 100–900)
WBC # BLD AUTO: 2.82 THOUSAND/UL (ref 4.31–10.16)

## 2020-08-01 PROCEDURE — 85025 COMPLETE CBC W/AUTO DIFF WBC: CPT

## 2020-08-01 PROCEDURE — 36415 COLL VENOUS BLD VENIPUNCTURE: CPT

## 2020-08-01 PROCEDURE — 82607 VITAMIN B-12: CPT

## 2020-08-01 PROCEDURE — 86022 PLATELET ANTIBODIES: CPT

## 2020-08-01 PROCEDURE — 84443 ASSAY THYROID STIM HORMONE: CPT

## 2020-08-03 ENCOUNTER — TELEPHONE (OUTPATIENT)
Dept: INTERNAL MEDICINE CLINIC | Facility: CLINIC | Age: 75
End: 2020-08-03

## 2020-08-03 DIAGNOSIS — D61.818 PANCYTOPENIA (HCC): Primary | ICD-10-CM

## 2020-08-03 DIAGNOSIS — C43.61 MALIGNANT MELANOMA OF RIGHT UPPER EXTREMITY INCLUDING SHOULDER (HCC): ICD-10-CM

## 2020-08-03 DIAGNOSIS — J44.9 STAGE 4 VERY SEVERE COPD BY GOLD CLASSIFICATION (HCC): ICD-10-CM

## 2020-08-03 DIAGNOSIS — I25.5 ISCHEMIC CARDIOMYOPATHY: ICD-10-CM

## 2020-08-03 LAB — PF4 HEPARIN CMPLX AB SER-ACNC: 0.05 OD (ref 0–0.4)

## 2020-08-03 NOTE — TELEPHONE ENCOUNTER
BW is back  B12 is in low normal range at 310, has he been taking B12 supplement daily? Recommend to take 1,000 micrograms per day of sublingual form(dissolves in the mouth) for better absorption  His blood count remains low including white blood cells, platelets and anemia  He needs to see hematologist(specialist in blood disorders), recommend [facility]'s office at [facility]      If he has symptoms such as fever, sore throat, bleeding or easy bruising he should be seen right away    Let me know if ?'s & send msg back to me to enter ref    -----------------------------    Results for orders placed or performed in visit on 08/01/20   Vitamin B12   Result Value Ref Range    Vitamin B-12 310 100 - 900 pg/mL   CBC and differential   Result Value Ref Range    WBC 2 82 (L) 4 31 - 10 16 Thousand/uL    RBC 3 46 (L) 3 88 - 5 62 Million/uL    Hemoglobin 11 5 (L) 12 0 - 17 0 g/dL    Hematocrit 36 5 36 5 - 49 3 %     (H) 82 - 98 fL    MCH 33 2 26 8 - 34 3 pg    MCHC 31 5 31 4 - 37 4 g/dL    RDW 14 9 11 6 - 15 1 %    MPV 11 1 8 9 - 12 7 fL    Platelets 274 (L) 406 - 390 Thousands/uL    nRBC 0 /100 WBCs    Neutrophils Relative 48 43 - 75 %    Immat GRANS % 0 0 - 2 %    Lymphocytes Relative 33 14 - 44 %    Monocytes Relative 14 (H) 4 - 12 %    Eosinophils Relative 5 0 - 6 %    Basophils Relative 0 0 - 1 %    Neutrophils Absolute 1 32 (L) 1 85 - 7 62 Thousands/µL    Immature Grans Absolute 0 01 0 00 - 0 20 Thousand/uL    Lymphocytes Absolute 0 94 0 60 - 4 47 Thousands/µL    Monocytes Absolute 0 39 0 17 - 1 22 Thousand/µL    Eosinophils Absolute 0 15 0 00 - 0 61 Thousand/µL    Basophils Absolute 0 01 0 00 - 0 10 Thousands/µL   TSH, 3rd generation with Free T4 reflex   Result Value Ref Range    TSH 3RD GENERATON 1 307 0 358 - 3 740 uIU/mL

## 2020-08-03 NOTE — TELEPHONE ENCOUNTER
Pt called back says already been taking B12 supplements for about 4-5 years now 100mcg  Patient says he bruises easily because he takes a blood thinner daily and responded neg to other symptons     Gave him number to [facility] office @ [address]   Can you put diagnoses in refferal    Thanks

## 2020-08-04 ENCOUNTER — TELEPHONE (OUTPATIENT)
Dept: SURGICAL ONCOLOGY | Facility: CLINIC | Age: 75
End: 2020-08-04

## 2020-08-04 NOTE — TELEPHONE ENCOUNTER
New Patient Encounter    New Patient Intake Form   Patient Details:  Lucille Closs  1945  [id number]    Background Information:  [address] starts by opening a telephone encounter and gathering the following information   Who is calling to schedule? If not self, relationship to patient? self   Referring Provider pcp   What is the diagnosis? Pancytopenia    Is this diagnosis confirmed? Yes   When was the diagnosis? 8/4   Is there a confirmed diagnosis from a biopsy/tissue reviewed by pathology? no   Is patient aware of diagnosis? Yes   Is there a personal history and what kind? na   Is there a family history and what kind? na   Reason for visit? New Diagnosis   Have you had any imaging or labs done? If so: when, where? yes  [facility]   Are records in "[employer]? yes   Was the patient told to bring a disk? no   Does the patient smoke or Vape? no   If yes, how many packs or cartridges per day? na   Scheduling Information:   Preferred [place]: [facility]     Are there any dates/time the patient cannot be seen? na   Miscellaneous: na   After completing the above information, please route to Financial Counselor and the appropriate Nurse Navigator for review

## 2020-08-17 ENCOUNTER — TELEPHONE (OUTPATIENT)
Dept: PULMONOLOGY | Facility: CLINIC | Age: 75
End: 2020-08-17

## 2020-08-17 NOTE — TELEPHONE ENCOUNTER
COVID Pre-Visit Screening     1  Is this a family member screening? /no  2  Have you traveled outside of your state in the past 2 weeks? /No  3  Do you presently have a fever or flu-like symptoms? /NO  4  Do you have symptoms of an upper respiratory infection like runny nose, sore throat, or cough? /NO:  5  Are you suffering from new headache that you have not had in the past?  no  6  Do you have/have you experienced any new shortness of breath recently? /NO  7  Do you have any new diarrhea, nausea or vomiting? /NO  8  Have you been in contact with anyone who has been sick or diagnosed with COVID-19? /NO  9  Do you have any new loss of taste or smell?/NO  10  Are you able to wear a mask without a valve for the entire visit?  {YES

## 2020-08-18 ENCOUNTER — OFFICE VISIT (OUTPATIENT)
Dept: PULMONOLOGY | Facility: CLINIC | Age: 75
End: 2020-08-18
Payer: COMMERCIAL

## 2020-08-18 VITALS
SYSTOLIC BLOOD PRESSURE: 104 MMHG | BODY MASS INDEX: 18.78 KG/M2 | WEIGHT: 131.2 LBS | HEART RATE: 82 BPM | HEIGHT: 70 IN | TEMPERATURE: 96.7 F | OXYGEN SATURATION: 100 % | DIASTOLIC BLOOD PRESSURE: 70 MMHG

## 2020-08-18 DIAGNOSIS — J96.12 CHRONIC RESPIRATORY FAILURE WITH HYPOXIA AND HYPERCAPNIA (HCC): ICD-10-CM

## 2020-08-18 DIAGNOSIS — J96.11 CHRONIC RESPIRATORY FAILURE WITH HYPOXIA AND HYPERCAPNIA (HCC): ICD-10-CM

## 2020-08-18 DIAGNOSIS — I50.42 CHRONIC COMBINED SYSTOLIC AND DIASTOLIC CONGESTIVE HEART FAILURE (HCC): Chronic | ICD-10-CM

## 2020-08-18 DIAGNOSIS — J44.9 STAGE 4 VERY SEVERE COPD BY GOLD CLASSIFICATION (HCC): Primary | ICD-10-CM

## 2020-08-18 DIAGNOSIS — I25.5 ISCHEMIC CARDIOMYOPATHY: ICD-10-CM

## 2020-08-18 PROCEDURE — 3078F DIAST BP <80 MM HG: CPT | Performed by: INTERNAL MEDICINE

## 2020-08-18 PROCEDURE — 3074F SYST BP LT 130 MM HG: CPT | Performed by: INTERNAL MEDICINE

## 2020-08-18 PROCEDURE — 1036F TOBACCO NON-USER: CPT | Performed by: INTERNAL MEDICINE

## 2020-08-18 PROCEDURE — 4040F PNEUMOC VAC/ADMIN/RCVD: CPT | Performed by: INTERNAL MEDICINE

## 2020-08-18 PROCEDURE — 1160F RVW MEDS BY RX/DR IN RCRD: CPT | Performed by: INTERNAL MEDICINE

## 2020-08-18 PROCEDURE — 3008F BODY MASS INDEX DOCD: CPT | Performed by: INTERNAL MEDICINE

## 2020-08-18 PROCEDURE — 99214 OFFICE O/P EST MOD 30 MIN: CPT | Performed by: INTERNAL MEDICINE

## 2020-08-18 NOTE — PROGRESS NOTES
Pulmonary Follow-Up Note   Marlene Hendrickson 77 y o  male MRN: [id number]  8/18/2020      Assessment/Plan:    1  Diminished walking distance: Patient is S/P pacemaker insertion  He is also following with hematology for an anemia  Will discuss cardiac rehab with cardiology  2  COPD: Patient has not completely stopped smoking  He uses Pro-Air PRN and nebulizers at home  He has been advised a follow up CT scan would be helpful for screening and treatment planning purposes  Problem List Items Addressed This Visit     None          Education provided at this visit:   Need for Vaccination: not reviewed   Pulmonary Rehab: Discussed possible cardiac rehab and benefits of  Smoking Cessation: Still occasional smoker  Lung Cancer Screening: Discussed F/U CT  Inhaler Use: Pro-Air  No follow-ups on file  All of this patients questions were answered prior to leaving the office today  He will follow-up with Dr Smita Christopher 3-4 months or sooner should the need arise  He is aware to call our office with any further questions or concerns  History of Present Illness   Reason for Visit: Follow Up OV  Chief Complaint: SOB with markedly diminished walking capacity  HPI: Marlene Hendrickson is a 77 y o  male who presents to the office today with CC's of shortness of breath, long standing  He is most concerned about walking distance which is limited to 25-30 feet even with O2 concentrator  He is also S/P pacemaker insertion which he reports as helpful  He is currently following with hematology for an anemia  Review of Systems   Constitutional: Negative for chills and fever  HENT: Negative for congestion and sore throat  Respiratory: Positive for cough (mild, nonproductive ) and shortness of breath  Cardiovascular: Negative for chest pain and leg swelling  Gastrointestinal: Positive for diarrhea (1 episode self limited in past month )   Negative for abdominal distention, abdominal pain, anal bleeding, blood in stool, constipation and nausea  Endocrine: Positive for cold intolerance  Skin: Positive for color change (hands cool and purple discoloration this AM )  Historical Information   Past Medical History:   Diagnosis Date    Cancer ([facility] )     melanoma left arm and left chest    Cataracts, bilateral     Cough with sputum     "once in awhile"    Dental bridge present     upper    Enlarged prostate     Heart attack ([facility] )     Hiatal hernia     History of coronary artery stent placement     History of pneumonia     History of transfusion     "possibly 30 yrs ago or so"    Hyperlipidemia     Hypertension     Oxygen dependent     Pulmonary emphysema ([facility] )     Shortness of breath     Urinary frequency     Urinary urgency      Past Surgical History:   Procedure Laterality Date    ANKLE SURGERY Right     BLADDER SURGERY  08/2018    COLONOSCOPY  2008    CORONARY STENT PLACEMENT  2008    HERNIA REPAIR      right inguinal hernia    LASIK Bilateral     KS TRANSURETHRAL ELEC-SURG PROSTATECTOM N/A 8/14/2018    Procedure: CYSTOSCOPY, TRANSURETHRAL RESECTION OF PROSTATE (TURP);   Surgeon: Judit Masters MD;  Location: [facility] OR;  Service: Urology    SKIN CANCER EXCISION Left     arm and chest/melanoma    TONSILLECTOMY      WISDOM TOOTH EXTRACTION       Family History   Problem Relation Age of Onset    Heart disease Father     Hypertension Father     Coronary artery disease Father     Hyperlipidemia Father     Heart disease Maternal Aunt     Heart disease Maternal Uncle     Cancer Neg Hx     Diabetes Neg Hx     Stroke Neg Hx     Arthritis Neg Hx     Sudden death Neg Hx         cardiac     Social History   Social History     Substance and Sexual Activity   Alcohol Use Yes    Alcohol/week: 2 0 standard drinks    Types: 2 Glasses of wine per week    Comment: 2 glasses of wine per week , heavier drinker years ago     Social History     Substance and Sexual Activity   Drug Use No Social History     Tobacco Use   Smoking Status Former Smoker    Packs/day: 1 00    Years: 52 00    Pack years: 52 00    Types: Cigarettes    Last attempt to quit: 2018    Years since quittin 3   Smokeless Tobacco Never Used   Tobacco Comment    smokes 1 cig once a week      E-Cigarette/Vaping     E-Cigarette/Vaping Substances       Meds/Allergies     Current Outpatient Medications:     albuterol (PROVENTIL HFA,VENTOLIN HFA) 90 mcg/act inhaler, Inhale 2 puffs every 4 (four) hours as needed for wheezing or shortness of breath, Disp: 3 Inhaler, Rfl: 3    aspirin (ECOTRIN LOW STRENGTH) 81 mg EC tablet, Take 1 tablet (81 mg total) by mouth daily, Disp: 90 tablet, Rfl: 3    carvedilol (COREG) 6 25 mg tablet, Take 12 5 mg by mouth 2 (two) times a day with meals, Disp: , Rfl:     Cholecalciferol (D-400 PO), Take 1 tablet by mouth once a week, Disp: , Rfl:     cyanocobalamin (VITAMIN B-12) 1,000 mcg tablet, Take 1,000 mcg by mouth daily, Disp: , Rfl:     fluticasone (FLONASE) 50 mcg/act nasal spray, 1 spray into each nostril daily, Disp: , Rfl:     fluticasone-salmeterol (ADVAIR, WIXELA) 100-50 mcg/dose inhaler, Inhale 1 puff 2 (two) times a day Rinse mouth after use , Disp: 3 Inhaler, Rfl: 3    ibuprofen (MOTRIN) 200 mg tablet, Take by mouth every 6 (six) hours as needed for mild pain, Disp: , Rfl:     ipratropium-albuterol (DUO-NEB) 0 5-2 5 mg/3 mL nebulizer solution, Take 1 vial (3 mL total) by nebulization 5 (five) times a day, Disp: 450 vial, Rfl: 3    OXYGEN-HELIUM IN, Inhale 3 liters NC continuously, Disp: , Rfl:     rosuvastatin (CRESTOR) 40 MG tablet, Take 1 tablet (40 mg total) by mouth daily, Disp: 90 tablet, Rfl: 2    sacubitril-valsartan (ENTRESTO) 24-26 MG TABS, Take 1 tablet by mouth 2 (two) times a day, Disp: 180 tablet, Rfl: 1  No Known Allergies    Vitals: Blood pressure 104/70, pulse 82, temperature (!) 96 7 °F (35 9 °C), height 5' 10" (1 778 m), weight 59 5 kg (131 lb 3 2 oz), SpO2 100 %  Body mass index is 18 83 kg/m²  Oxygen Therapy  SpO2: 100 %  Oxygen Therapy: Supplemental oxygen  O2 Delivery Method: Nasal cannula  O2 Flow Rate (L/min): 3 L/min    Physical Exam: see below  Physical Exam  HENT:      Head: Atraumatic  Eyes:      Extraocular Movements: Extraocular movements intact  Cardiovascular:      Rate and Rhythm: Regular rhythm  Heart sounds: No murmur  Pulmonary:      Breath sounds: No wheezing or rhonchi  Neurological:      Mental Status: He is alert  Psychiatric:         Mood and Affect: Mood normal          Behavior: Behavior normal          Labs: Reviewed most recent CBC    Imaging and other studies: I have personally reviewed pertinent reports  Pulmonary Results (PFTs, PSG): I have personally reviewed pertinent reports  Today's Testing: None performed this OV  MD [facility]        Portions of the record may have been created with voice recognition software  Occasional wrong word or "sound a like" substitutions may have occurred due to the inherent limitations of voice recognition software  Read the chart carefully and recognize, using context, where substitutions have occurred or contact the dictating provider

## 2020-08-24 ENCOUNTER — OFFICE VISIT (OUTPATIENT)
Dept: CARDIOLOGY CLINIC | Facility: CLINIC | Age: 75
End: 2020-08-24
Payer: COMMERCIAL

## 2020-08-24 VITALS
HEART RATE: 65 BPM | OXYGEN SATURATION: 94 % | WEIGHT: 133.8 LBS | BODY MASS INDEX: 19.15 KG/M2 | SYSTOLIC BLOOD PRESSURE: 108 MMHG | DIASTOLIC BLOOD PRESSURE: 85 MMHG | HEIGHT: 70 IN

## 2020-08-24 DIAGNOSIS — I50.42 CHRONIC COMBINED SYSTOLIC AND DIASTOLIC CONGESTIVE HEART FAILURE (HCC): Chronic | ICD-10-CM

## 2020-08-24 DIAGNOSIS — E78.5 DYSLIPIDEMIA: ICD-10-CM

## 2020-08-24 DIAGNOSIS — J96.11 CHRONIC RESPIRATORY FAILURE WITH HYPOXIA AND HYPERCAPNIA (HCC): ICD-10-CM

## 2020-08-24 DIAGNOSIS — I25.5 ISCHEMIC CARDIOMYOPATHY: Primary | ICD-10-CM

## 2020-08-24 DIAGNOSIS — Z95.810 BIVENTRICULAR ICD (IMPLANTABLE CARDIOVERTER-DEFIBRILLATOR) IN PLACE: ICD-10-CM

## 2020-08-24 DIAGNOSIS — J96.12 CHRONIC RESPIRATORY FAILURE WITH HYPOXIA AND HYPERCAPNIA (HCC): ICD-10-CM

## 2020-08-24 DIAGNOSIS — I10 ESSENTIAL HYPERTENSION: ICD-10-CM

## 2020-08-24 PROCEDURE — 3008F BODY MASS INDEX DOCD: CPT | Performed by: INTERNAL MEDICINE

## 2020-08-24 PROCEDURE — 99024 POSTOP FOLLOW-UP VISIT: CPT | Performed by: INTERNAL MEDICINE

## 2020-08-24 PROCEDURE — 1160F RVW MEDS BY RX/DR IN RCRD: CPT | Performed by: INTERNAL MEDICINE

## 2020-08-24 PROCEDURE — 3074F SYST BP LT 130 MM HG: CPT | Performed by: INTERNAL MEDICINE

## 2020-08-24 PROCEDURE — 3079F DIAST BP 80-89 MM HG: CPT | Performed by: INTERNAL MEDICINE

## 2020-08-24 PROCEDURE — 4040F PNEUMOC VAC/ADMIN/RCVD: CPT | Performed by: INTERNAL MEDICINE

## 2020-08-24 PROCEDURE — 1036F TOBACCO NON-USER: CPT | Performed by: INTERNAL MEDICINE

## 2020-08-24 NOTE — PROGRESS NOTES
Cardiology   Lucille Closs 77 y o  male MRN: [id number]      Impression:  1  Ischemic cardiomyopathy - EF 20% by echo   S/P BiV ICD  2  COPD - on chronic O2   Currently evaluated for possible Lung Transplantation  3  Dyslipidemia - on statin      Recommendations:  1  Continue current medications  2  Follow up in 6 months  HPI: Lucille Closs is a 77y o  year old male with ischemic cardiomyopathy s/p multiple PCI, COPD, who presents for follow up   Recently moved from [place]   Does wear chronic oxygen of 3L, and has dyspnea on exertion   An echocardiogram 6/19 demonstrated EF 20%, which is essentially unchanged from 2017   No chest pain or palpitations    Feels like breathing is stable - on 3 L O2  Review of Systems   Constitutional: Negative  HENT: Negative  Eyes: Negative  Respiratory: Positive for shortness of breath  Negative for chest tightness  Cardiovascular: Negative for chest pain, palpitations and leg swelling  Gastrointestinal: Negative  Endocrine: Negative  Genitourinary: Negative  Musculoskeletal: Negative  Skin: Negative  Allergic/Immunologic: Negative  Neurological: Negative  Hematological: Negative  Psychiatric/Behavioral: Negative  All other systems reviewed and are negative          Past Medical History:   Diagnosis Date    Cancer [facility])     melanoma left arm and left chest    Cataracts, bilateral     Cough with sputum     "once in awhile"    Dental bridge present     upper    Enlarged prostate     Heart attack ([address] )     Hiatal hernia     History of coronary artery stent placement     History of pneumonia     History of transfusion     "possibly 30 yrs ago or so"    Hyperlipidemia     Hypertension     Oxygen dependent     Pulmonary emphysema (HCC)     Shortness of breath     Urinary frequency     Urinary urgency      Past Surgical History:   Procedure Laterality Date    ANKLE SURGERY Right     BLADDER SURGERY 2018    COLONOSCOPY      CORONARY STENT PLACEMENT      HERNIA REPAIR      right inguinal hernia    LASIK Bilateral     [place] TRANSURETHRAL ELEC-SURG PROSTATECTOM N/A 2018    Procedure: CYSTOSCOPY, TRANSURETHRAL RESECTION OF PROSTATE (TURP);   Surgeon: Amber Bustamante MD;  [facility];  Service: Urology    SKIN CANCER EXCISION Left     arm and chest/melanoma    TONSILLECTOMY      WISDOM TOOTH EXTRACTION       Social History     Substance and Sexual Activity   Alcohol Use Yes    Alcohol/week: 2 0 standard drinks    Types: 2 Glasses of wine per week    Comment: 2 glasses of wine per week , heavier drinker years ago     Social History     Substance and Sexual Activity   Drug Use No     Social History     Tobacco Use   Smoking Status Former Smoker    Packs/day: 1 00    Years: 52 00    Pack years: 52 00    Types: Cigarettes    Last attempt to quit: 2018    Years since quittin 3   Smokeless Tobacco Never Used   Tobacco Comment    smokes 1 cig once a week      Family History   Problem Relation Age of Onset    Heart disease Father     Hypertension Father     Coronary artery disease Father     Hyperlipidemia Father     Heart disease Maternal Aunt     Heart disease Maternal Uncle     Cancer Neg Hx     Diabetes Neg Hx     Stroke Neg Hx     Arthritis Neg Hx     Sudden death Neg Hx         cardiac       Allergies:  No Known Allergies    Medications:     Current Outpatient Medications:     albuterol (PROVENTIL HFA,VENTOLIN HFA) 90 mcg/act inhaler, Inhale 2 puffs every 4 (four) hours as needed for wheezing or shortness of breath, Disp: 3 Inhaler, Rfl: 3    aspirin (ECOTRIN LOW STRENGTH) 81 mg EC tablet, Take 1 tablet (81 mg total) by mouth daily, Disp: 90 tablet, Rfl: 3    carvedilol (COREG) 6 25 mg tablet, Take 12 5 mg by mouth 2 (two) times a day with meals, Disp: , Rfl:     Cholecalciferol (D-400 PO), Take 1 tablet by mouth once a week, Disp: , Rfl:     cyanocobalamin (VITAMIN B-12) 1,000 mcg tablet, Take 1,000 mcg by mouth daily, Disp: , Rfl:     fluticasone (FLONASE) 50 mcg/act nasal spray, 1 spray into each nostril daily, Disp: , Rfl:     fluticasone-salmeterol (ADVAIR, WIXELA) 100-50 mcg/dose inhaler, Inhale 1 puff 2 (two) times a day Rinse mouth after use , Disp: 3 Inhaler, Rfl: 3    ibuprofen (MOTRIN) 200 mg tablet, Take by mouth every 6 (six) hours as needed for mild pain, Disp: , Rfl:     ipratropium-albuterol (DUO-NEB) 0 5-2 5 mg/3 mL nebulizer solution, Take 1 vial (3 mL total) by nebulization 5 (five) times a day, Disp: 450 vial, Rfl: 3    OXYGEN-HELIUM IN, Inhale 3 liters NC continuously, Disp: , Rfl:     rosuvastatin (CRESTOR) 40 MG tablet, Take 1 tablet (40 mg total) by mouth daily, Disp: 90 tablet, Rfl: 2    sacubitril-valsartan (ENTRESTO) 24-26 MG TABS, Take 1 tablet by mouth 2 (two) times a day, Disp: 180 tablet, Rfl: 1      Wt Readings from Last 3 Encounters:   08/24/20 60 7 kg (133 lb 12 8 oz)   08/18/20 59 5 kg (131 lb 3 2 oz)   07/20/20 60 3 kg (133 lb)     Temp Readings from Last 3 Encounters:   08/18/20 (!) 96 7 °F (35 9 °C)   07/20/20 98 2 °F (36 8 °C)   06/19/20 98 7 °F (37 1 °C) (Oral)     BP Readings from Last 3 Encounters:   08/24/20 108/85   08/18/20 104/70   06/20/20 131/78     Pulse Readings from Last 3 Encounters:   08/24/20 65   08/18/20 82   07/20/20 81         Physical Exam  HENT:      Head: Atraumatic  Mouth/Throat:      Mouth: Mucous membranes are moist    Eyes:      Extraocular Movements: Extraocular movements intact  Neck:      Musculoskeletal: Normal range of motion  Cardiovascular:      Rate and Rhythm: Normal rate and regular rhythm  Heart sounds: Normal heart sounds  Pulmonary:      Effort: Pulmonary effort is normal       Breath sounds: Normal breath sounds  Abdominal:      General: Abdomen is flat  Musculoskeletal: Normal range of motion  Skin:     General: Skin is warm     Neurological:      General: No focal deficit present  Mental Status: He is alert and oriented to person, place, and time  Psychiatric:         Mood and Affect: Mood normal          Behavior: Behavior normal            Laboratory Studies:  CMP:  Lab Results   Component Value Date    K 4 4 2020    CL 99 (L) 2020    CO2 32 2020    BUN 17 2020    CREATININE 0 82 2020    GLUCOSE 119 10/08/2018    AST 14 2020    ALT 6 (L) 2020    EGFR 87 2020       Lipid Profile:   No results found for: CHOL  Lab Results   Component Value Date    HDL 44 2020     Lab Results   Component Value Date    LDLCALC 54 2020     Lab Results   Component Value Date    TRIG 60 2020       Cardiac testing:     Results for orders placed during the hospital encounter of 19   Echo complete with contrast if indicated    Narrative [facility], [address] [facility]  [phone]    Transthoracic Echocardiogram  2D, M-mode, Doppler, and Color Doppler    Study date:  2019    Patient: Milly Zimmer  MR number: [id number]  Account number: [de-identified]  : 1945  Age: 69 years  Gender: Male  Status: Outpatient  Location: [address] [place] [address] and Vascular Center  Height: 71 in  Weight: 150 7 lb  BP: 112/ 64 mmHg    Indications: Assess left ventricular function  Diagnoses: I42 9 - Cardiomyopathy, unspecified    Sonographer:  CARA London  Primary Physician:  Oj Dumont DO  Referring Physician:  Jarrod Shepherd MD  Group:  [facility]  Interpreting Physician:  Jarrod Shepherd MD    SUMMARY    LEFT VENTRICLE:  Size was normal   Systolic function was severely reduced  Ejection fraction was estimated to be 20 %  There was severe diffuse hypokinesis with akinesis of the mid to distal anterior, anteroseptal, anterolateral, distal inferior, and apical walls    Wall thickness was normal   Doppler parameters were consistent with abnormal left ventricular relaxation (grade 1 diastolic dysfunction)  RIGHT VENTRICLE:  The size was normal   Systolic function was normal     MITRAL VALVE:  There was trace regurgitation  TRICUSPID VALVE:  There was mild regurgitation  COMPARISONS:  Comparison was made with the previous study of 20-May-2018  Ejection fraction has decreased  HISTORY: PRIOR HISTORY: Cardiomyopathy, CAD s/p MI and stent, Hypertension, Hyperlipidemia, COPD    PROCEDURE: The study was performed in the [facility]  This was a routine study  The transthoracic approach was used  The study included complete 2D imaging, M-mode, complete spectral Doppler, and color Doppler  The  heart rate was 67 bpm, at the start of the study  Images were obtained from the parasternal, apical, subcostal, and suprasternal notch acoustic windows  Image quality was adequate  LEFT VENTRICLE: Size was normal  Systolic function was severely reduced  Ejection fraction was estimated to be 20 %  There was severe diffuse hypokinesis with akinesis of the mid to distal anterior, anteroseptal, anterolateral, distal  inferior, and apical walls  Wall thickness was normal  DOPPLER: Doppler parameters were consistent with abnormal left ventricular relaxation (grade 1 diastolic dysfunction)  RIGHT VENTRICLE: The size was normal  Systolic function was normal  Wall thickness was normal     LEFT ATRIUM: Size was normal     RIGHT ATRIUM: Size was normal     MITRAL VALVE: Valve structure was normal  There was normal leaflet separation  DOPPLER: The transmitral velocity was within the normal range  There was no evidence for stenosis  There was trace regurgitation  AORTIC VALVE: The valve was trileaflet  Leaflets exhibited normal thickness and normal cuspal separation  DOPPLER: Transaortic velocity was within the normal range  There was no evidence for stenosis  There was no regurgitation      TRICUSPID VALVE: The valve structure was normal  There was normal leaflet separation  DOPPLER: The transtricuspid velocity was within the normal range  There was no evidence for stenosis  There was mild regurgitation  The tricuspid jet  envelope definition was inadequate for estimation of RV systolic pressure  There are no indirect findings suggestive of moderate or severe pulmonary hypertension  PULMONIC VALVE: Leaflets exhibited normal thickness, no calcification, and normal cuspal separation  DOPPLER: The transpulmonic velocity was within the normal range  There was no regurgitation  PERICARDIUM: There was no pericardial effusion  The pericardium was normal in appearance  AORTA: The root exhibited normal size      SYSTEM MEASUREMENT TABLES    2D  %FS: 10 16 %  AV Diam: 3 13 cm  EDV(Teich): 175 86 ml  EF Biplane: 27 91 %  EF(Cube): 27 48 %  EF(Teich): 21 82 %  ESV(Cube): 151 93 ml  ESV(Teich): 137 48 ml  IVSd: 0 6 cm  LA Area: 14 74 cm2  LA Diam: 3 73 cm  LVEDV MOD A2C: 187 42 ml  LVEDV MOD A4C: 181 57 ml  LVEDV MOD BP: 191 95 ml  LVEF MOD A2C: 22 72 %  LVEF MOD A4C: 25 16 %  LVESV MOD A2C: 144 84 ml  LVESV MOD A4C: 135 88 ml  LVESV MOD BP: 138 37 ml  LVIDd: 5 94 cm  LVIDs: 5 34 cm  LVLd A2C: 10 23 cm  LVLd A4C: 9 93 cm  LVLs A2C: 9 79 cm  LVLs A4C: 9 74 cm  LVPWd: 0 72 cm  RA Area: 12 54 cm2  RV Diam: 3 64 cm  SI(Cube): 30 78 ml/m2  SI(Teich): 20 52 ml/m2  SV MOD A2C: 42 58 ml  SV MOD A4C: 45 69 ml  SV(Cube): 57 56 ml  SV(Teich): 38 38 ml    CW  TR MaxP 47 mmHg  TR Vmax: 2 89 m/s    MM  TAPSE: 2 09 cm    PW  AVC: 334 33 ms  E': 0 05 m/s  E/E': 11 68  MV A Bogdan: 0 63 m/s  MV Dec [place]: 3 42 m/s2  MV DecT: 158 63 ms  MV E Bogdan: 0 54 m/s  MV E/A Ratio: 0 86    [employer] Accredited Echocardiography [facility]    Prepared and electronically signed by    Wendy Flynn MD  Signed 2019 16:22:48 unable to assess

## 2020-09-13 NOTE — PROGRESS NOTES
Hematology/Oncology Outpatient Consult Note  Carlita Anglin 77 y o  male MRN: @ Encounter: [id number]        Date:  9/17/2020      CC:  Pancytopenia      HPI:  Carlita Anglin is a 76 yo male with multiple medical comorbidities including CHF,  ischemic cardiomyopathy s/p ICD, severe COPD O2 dependent on 3L NC  He was referred to hematology for evaluation of pancytopenia    In review of patient's blood work, he does have a history of mild anemia dating back to 5/2018  His bloodwork has been more consistent with pancytopenia since 3/2020:    5/22/18: Hgb 11 4, Hct 35 3, MCV 96, WBC 7 72, platelets 878    1/6/97: Hgb 10 8, WBC 5 6, Platelets 009    2/32/11: Hgb 11 7, , WBC 4 08, Platelets 686    7/63/45: Hb 12 7, , WBC 3 51, Platelets 773    2/88/35: Hgb 11 4, , WBC 3 13, Platelets 93    4/65/75: Hgb 11 4, MCV, 105, WBC 3 13, Platelets 99    8/4/68: Hgb 11 5, , WBC 2 82, Platelets 303      Interval History: Patient denies any symptoms of bleeding  No epistaxis, gingival bleeding  He denies easy bruisability  There is no ecchymosis on his back, abdomen, lower extremities  He  denies any melena, hematochezia, hematuria  He denies any excessive fatigue, unexpected weight loss, night sweats, fevers/chills or recurrent infections  He does have mild fatigue and dyspnea with exertion  He reports these symptoms are at his baseline  He denies any chest pain, palpitations, lightheadedness or dizziness  Previous Hematologic/ Oncologic History:    Oncology History    No history exists  Test Results:    Imaging: No results found      Labs:   Lab Results   Component Value Date    WBC 2 82 (L) 08/01/2020    HGB 11 5 (L) 08/01/2020    HCT 36 5 08/01/2020     (H) 08/01/2020     (L) 08/01/2020     Lab Results   Component Value Date    K 4 4 07/14/2020    CL 99 (L) 07/14/2020    CO2 32 07/14/2020    BUN 17 07/14/2020    CREATININE 0 82 07/14/2020    GLUCOSE 119 10/08/2018 GLUF 84 07/14/2020    CALCIUM 8 8 07/14/2020    AST 14 07/14/2020    ALT 6 (L) 07/14/2020    ALKPHOS 81 07/14/2020    EGFR 87 07/14/2020         ROS:  Review of Systems   Constitutional: Positive for fatigue  Respiratory: Positive for shortness of breath (On chronic O2 3L NC)  Skin: Positive for pallor  All other systems reviewed and are negative        Active Problems:   Patient Active Problem List   Diagnosis    Chronic respiratory failure with hypoxia and hypercapnia (HCC)    Coronary artery disease without angina pectoris    History of tobacco use    Chronic combined systolic and diastolic congestive heart failure (HCC)    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 shoulder (HCC)    Frequency of urination    BPH with urinary obstruction    Urge incontinence    Benign prostatic hyperplasia with urinary retention    Stage 4 very severe COPD by GOLD classification ([address] )    Essential hypertension    Dyslipidemia    H/O heart artery stent    Mixed hyperlipidemia    Supplemental oxygen dependent    Ischemic cardiomyopathy    Thrombocytopenia (HCC)    Biventricular ICD (implantable cardioverter-defibrillator) in place    Pancytopenia ([address] )    MDS (myelodysplastic syndrome) ([address] )       Past Medical History:   Past Medical History:   Diagnosis Date    Cancer ([address] )     melanoma left arm and left chest    Cataracts, bilateral     Cough with sputum     "once in awhile"    Dental bridge present     upper    Enlarged prostate     Heart attack ([address] )     Hiatal hernia     History of coronary artery stent placement     History of pneumonia     History of transfusion     "possibly 30 yrs ago or so"    Hyperlipidemia     Hypertension     Oxygen dependent     Pulmonary emphysema ([address] )     Shortness of breath     Urinary frequency     Urinary urgency        Surgical History:   Past Surgical History:   Procedure Laterality Date    ANKLE SURGERY Right     BLADDER SURGERY 2018    COLONOSCOPY      CORONARY STENT PLACEMENT      HERNIA REPAIR      right inguinal hernia    LASIK Bilateral     SD TRANSURETHRAL ELEC-SURG PROSTATECTOM N/A 2018    Procedure: CYSTOSCOPY, TRANSURETHRAL RESECTION OF PROSTATE (TURP); Surgeon: Loree Chapin MD;  [facility];  Service: Urology    SKIN CANCER EXCISION Left     arm and chest/melanoma    TONSILLECTOMY      WISDOM TOOTH EXTRACTION         Family History:    Family History   Problem Relation Age of Onset    Heart disease Father     Hypertension Father     Coronary artery disease Father     Hyperlipidemia Father     Heart disease Maternal Aunt     Heart disease Maternal Uncle     Cancer Neg Hx     Diabetes Neg Hx     Stroke Neg Hx     Arthritis Neg Hx     Sudden death Neg Hx         cardiac       Cancer-related family history is negative for Cancer  Social History:   Social History     Socioeconomic History    Marital status: Single     Spouse name: Not on file    Number of children: Not on file    Years of education: Not on file    Highest education level: Not on file   Occupational History    Not on file   Social Needs    Financial resource strain: Not on file    Food insecurity     Worry: Not on file     Inability: Not on file    Transportation needs     Medical: Not on file     Non-medical: Not on file   Tobacco Use    Smoking status: Former Smoker     Packs/day: 1 00     Years: 52 00     Pack years: 52 00     Types: Cigarettes     Last attempt to quit: 2018     Years since quittin 3    Smokeless tobacco: Never Used    Tobacco comment: smokes 1 cig once a week    Substance and Sexual Activity    Alcohol use:  Yes     Alcohol/week: 2 0 standard drinks     Types: 2 Glasses of wine per week     Comment: 2 glasses of wine per week , heavier drinker years ago    Drug use: No    Sexual activity: Not on file   Lifestyle    Physical activity     Days per week: Not on file     Minutes per session: Not on file    Stress: Not on file   Relationships    Social connections     Talks on phone: Not on file     Gets together: Not on file     Attends Holiness service: Not on file     Active member of club or organization: Not on file     Attends meetings of clubs or organizations: Not on file     Relationship status: Not on file    Intimate partner violence     Fear of current or ex partner: Not on file     Emotionally abused: Not on file     Physically abused: Not on file     Forced sexual activity: Not on file   Other Topics Concern    Not on file   Social History Narrative    Not on file       Current Medications:   Current Outpatient Medications   Medication Sig Dispense Refill    albuterol (PROVENTIL HFA,VENTOLIN HFA) 90 mcg/act inhaler Inhale 2 puffs every 4 (four) hours as needed for wheezing or shortness of breath 3 Inhaler 3    aspirin (ECOTRIN LOW STRENGTH) 81 mg EC tablet Take 1 tablet (81 mg total) by mouth daily 90 tablet 3    carvedilol (COREG) 6 25 mg tablet Take 12 5 mg by mouth 2 (two) times a day with meals      cyanocobalamin (VITAMIN B-12) 1,000 mcg tablet Take 1,000 mcg by mouth daily      fluticasone (FLONASE) 50 mcg/act nasal spray 1 spray into each nostril daily      fluticasone-salmeterol (ADVAIR, WIXELA) 100-50 mcg/dose inhaler Inhale 1 puff 2 (two) times a day Rinse mouth after use   3 Inhaler 3    ibuprofen (MOTRIN) 200 mg tablet Take by mouth every 6 (six) hours as needed for mild pain      ipratropium-albuterol (DUO-NEB) 0 5-2 5 mg/3 mL nebulizer solution Take 1 vial (3 mL total) by nebulization 5 (five) times a day 450 vial 3    OXYGEN-HELIUM IN Inhale 3 liters NC continuously      rosuvastatin (CRESTOR) 40 MG tablet Take 1 tablet (40 mg total) by mouth daily 90 tablet 2    sacubitril-valsartan (ENTRESTO) 24-26 MG TABS Take 1 tablet by mouth 2 (two) times a day 180 tablet 1    Cholecalciferol (D-400 PO) Take 1 tablet by mouth once a week       No current facility-administered medications for this visit  Allergies: No Known Allergies      Physical Exam:    Body surface area is 1 74 meters squared  Wt Readings from Last 3 Encounters:   09/17/20 60 8 kg (134 lb)   08/24/20 60 7 kg (133 lb 12 8 oz)   08/18/20 59 5 kg (131 lb 3 2 oz)        Temp Readings from Last 3 Encounters:   09/17/20 (!) 97 3 °F (36 3 °C) (Temporal)   08/18/20 (!) 96 7 °F (35 9 °C)   07/20/20 98 2 °F (36 8 °C)        BP Readings from Last 3 Encounters:   09/17/20 140/78   08/24/20 108/85   08/18/20 104/70         Pulse Readings from Last 3 Encounters:   09/17/20 72   08/24/20 65   08/18/20 82          Physical Exam  Constitutional:       General: He is not in acute distress  Comments: Alert, pleasant elderly male  Thin appearing, chronically ill  NAD   HENT:      Head: Normocephalic and atraumatic  Mouth/Throat:      Mouth: Mucous membranes are moist       Pharynx: Oropharynx is clear  Eyes:      General: No scleral icterus  Right eye: No discharge  Left eye: No discharge  Extraocular Movements: Extraocular movements intact  Cardiovascular:      Rate and Rhythm: Normal rate and regular rhythm  Pulmonary:      Effort: Pulmonary effort is normal       Breath sounds: Decreased breath sounds present  No wheezing or rales  Comments: O2 3L NC  Abdominal:      General: Abdomen is flat  Bowel sounds are normal       Palpations: Abdomen is soft  Musculoskeletal: Normal range of motion  Right lower leg: No edema  Left lower leg: No edema  Lymphadenopathy:      Cervical: No cervical adenopathy  Skin:     General: Skin is warm and dry  Coloration: Skin is pale  Findings: No bruising  Comments: No petechia noted    Neurological:      General: No focal deficit present  Mental Status: He is alert and oriented to person, place, and time     Psychiatric:         Mood and Affect: Mood normal          Behavior: Behavior normal  Assessment/ Plan:    1  Pancytopenia ([facility] )    2  L[place] Simple MDS (myelodysplastic syndrome) ([facility] )      The patient is a very pleasant 78 yo male multiple medical problems including severe COPD on chronic oxygen therapy, ischemic cardiomyopathy s/p multiple PCI and EF 20% who was referred to hematology for evaluation of pancytopenia  In review of his blood counts, he does have a history of mild anemia dating back to 5/2018  His bloodwork has been more consistent with pancytopenia since 3/2020  I spent time reviewing patient's blood work today and explained his most recent CBC reveals macrocytic anemia along with leukopenia and thrombocytopenia:  hemoglobin of 11 5, , WBC 2 82, Platelets 923  His ANC is low 1 32  He denies any symptoms of infection, bleeding  Possible etiologies include mild myelodysplastic syndrome, folate/B12 deficiency, myeloma  His TSH is normal r/o thyroid condition that could contribute to a macrocytic anemia  I explained that since all his cell lines are decreased this raises suspicion for a bone marrow disorder, more specifically a MDS  I reviewed best way to determine this is to do a bone marrow biopsy and answered questions about how a bone marrow biopsy is performed  Patient is not interested in undergoing a bone marrow at this time and is more agreeable to having additional blood studies ordered first    I will repeat his CBC, CMP and check iron panel, MMA, folate, SPEP, free light chains  He will return in one month to review results  He will think about undergoing a bone biopsy and is willing to further discuss at next visit after reviewing additional lab studies  I feel this reasonable and his blood counts are not low enough to warrant any transfusions at this time  He will return for a follow up in one month with blood work  He was instructed to call with any questions or concerns prior to his next office visit  Goals and Barriers:  Current Goal:   Barriers: None  Patient's Capacity to Self Care:  Patient is able to self care  Portions of the record may have been created with voice recognition software  Occasional wrong word or "sound a like" substitutions may have occurred due to the inherent limitations of voice recognition software  Read the chart carefully and recognize, using context, where substitutions have occurred

## 2020-09-16 PROBLEM — D61.818 PANCYTOPENIA (HCC): Status: ACTIVE | Noted: 2020-09-16

## 2020-09-17 ENCOUNTER — CONSULT (OUTPATIENT)
Dept: HEMATOLOGY ONCOLOGY | Facility: CLINIC | Age: 75
End: 2020-09-17
Payer: COMMERCIAL

## 2020-09-17 VITALS
HEIGHT: 69 IN | WEIGHT: 134 LBS | RESPIRATION RATE: 18 BRPM | SYSTOLIC BLOOD PRESSURE: 140 MMHG | TEMPERATURE: 97.3 F | DIASTOLIC BLOOD PRESSURE: 78 MMHG | BODY MASS INDEX: 19.85 KG/M2 | HEART RATE: 72 BPM

## 2020-09-17 DIAGNOSIS — D46.9 MDS (MYELODYSPLASTIC SYNDROME) (HCC): ICD-10-CM

## 2020-09-17 DIAGNOSIS — C43.61 MALIGNANT MELANOMA OF RIGHT UPPER EXTREMITY INCLUDING SHOULDER (HCC): ICD-10-CM

## 2020-09-17 DIAGNOSIS — J44.9 STAGE 4 VERY SEVERE COPD BY GOLD CLASSIFICATION (HCC): ICD-10-CM

## 2020-09-17 DIAGNOSIS — I25.5 ISCHEMIC CARDIOMYOPATHY: ICD-10-CM

## 2020-09-17 DIAGNOSIS — D61.818 PANCYTOPENIA (HCC): Primary | ICD-10-CM

## 2020-09-17 PROCEDURE — 99205 OFFICE O/P NEW HI 60 MIN: CPT | Performed by: NURSE PRACTITIONER

## 2020-09-23 ENCOUNTER — APPOINTMENT (OUTPATIENT)
Dept: LAB | Facility: CLINIC | Age: 75
End: 2020-09-23
Payer: COMMERCIAL

## 2020-09-23 DIAGNOSIS — D61.818 PANCYTOPENIA (HCC): ICD-10-CM

## 2020-09-23 DIAGNOSIS — D46.9 MDS (MYELODYSPLASTIC SYNDROME) (HCC): ICD-10-CM

## 2020-09-23 LAB
ALBUMIN SERPL BCP-MCNC: 3.6 G/DL (ref 3.5–5)
ALP SERPL-CCNC: 83 U/L (ref 46–116)
ALT SERPL W P-5'-P-CCNC: 15 U/L (ref 12–78)
ANION GAP SERPL CALCULATED.3IONS-SCNC: 7 MMOL/L (ref 4–13)
AST SERPL W P-5'-P-CCNC: 12 U/L (ref 5–45)
BASOPHILS # BLD AUTO: 0.01 THOUSANDS/ΜL (ref 0–0.1)
BASOPHILS NFR BLD AUTO: 0 % (ref 0–1)
BILIRUB SERPL-MCNC: 0.74 MG/DL (ref 0.2–1)
BUN SERPL-MCNC: 15 MG/DL (ref 5–25)
CALCIUM SERPL-MCNC: 8.5 MG/DL (ref 8.3–10.1)
CHLORIDE SERPL-SCNC: 101 MMOL/L (ref 100–108)
CO2 SERPL-SCNC: 31 MMOL/L (ref 21–32)
CREAT SERPL-MCNC: 0.77 MG/DL (ref 0.6–1.3)
EOSINOPHIL # BLD AUTO: 0.13 THOUSAND/ΜL (ref 0–0.61)
EOSINOPHIL NFR BLD AUTO: 4 % (ref 0–6)
ERYTHROCYTE [DISTWIDTH] IN BLOOD BY AUTOMATED COUNT: 14.3 % (ref 11.6–15.1)
FERRITIN SERPL-MCNC: 90 NG/ML (ref 8–388)
FOLATE SERPL-MCNC: 9.4 NG/ML (ref 3.1–17.5)
GFR SERPL CREATININE-BSD FRML MDRD: 89 ML/MIN/1.73SQ M
GLUCOSE SERPL-MCNC: 108 MG/DL (ref 65–140)
HCT VFR BLD AUTO: 35.5 % (ref 36.5–49.3)
HGB BLD-MCNC: 11.1 G/DL (ref 12–17)
IMM GRANULOCYTES # BLD AUTO: 0 THOUSAND/UL (ref 0–0.2)
IMM GRANULOCYTES NFR BLD AUTO: 0 % (ref 0–2)
IRON SATN MFR SERPL: 20 %
IRON SERPL-MCNC: 53 UG/DL (ref 65–175)
LYMPHOCYTES # BLD AUTO: 0.96 THOUSANDS/ΜL (ref 0.6–4.47)
LYMPHOCYTES NFR BLD AUTO: 26 % (ref 14–44)
MCH RBC QN AUTO: 33.1 PG (ref 26.8–34.3)
MCHC RBC AUTO-ENTMCNC: 31.3 G/DL (ref 31.4–37.4)
MCV RBC AUTO: 106 FL (ref 82–98)
MONOCYTES # BLD AUTO: 0.49 THOUSAND/ΜL (ref 0.17–1.22)
MONOCYTES NFR BLD AUTO: 13 % (ref 4–12)
NEUTROPHILS # BLD AUTO: 2.06 THOUSANDS/ΜL (ref 1.85–7.62)
NEUTS SEG NFR BLD AUTO: 57 % (ref 43–75)
NRBC BLD AUTO-RTO: 0 /100 WBCS
PLATELET # BLD AUTO: 132 THOUSANDS/UL (ref 149–390)
PMV BLD AUTO: 11.5 FL (ref 8.9–12.7)
POTASSIUM SERPL-SCNC: 3.9 MMOL/L (ref 3.5–5.3)
PROT SERPL-MCNC: 6.5 G/DL (ref 6.4–8.2)
RBC # BLD AUTO: 3.35 MILLION/UL (ref 3.88–5.62)
SODIUM SERPL-SCNC: 139 MMOL/L (ref 136–145)
TIBC SERPL-MCNC: 265 UG/DL (ref 250–450)
WBC # BLD AUTO: 3.65 THOUSAND/UL (ref 4.31–10.16)

## 2020-09-23 PROCEDURE — 83883 ASSAY NEPHELOMETRY NOT SPEC: CPT

## 2020-09-23 PROCEDURE — 84165 PROTEIN E-PHORESIS SERUM: CPT | Performed by: PATHOLOGY

## 2020-09-23 PROCEDURE — 82728 ASSAY OF FERRITIN: CPT

## 2020-09-23 PROCEDURE — 36415 COLL VENOUS BLD VENIPUNCTURE: CPT

## 2020-09-23 PROCEDURE — 83540 ASSAY OF IRON: CPT

## 2020-09-23 PROCEDURE — 86334 IMMUNOFIX E-PHORESIS SERUM: CPT

## 2020-09-23 PROCEDURE — 85025 COMPLETE CBC W/AUTO DIFF WBC: CPT

## 2020-09-23 PROCEDURE — 82746 ASSAY OF FOLIC ACID SERUM: CPT

## 2020-09-23 PROCEDURE — 83550 IRON BINDING TEST: CPT

## 2020-09-23 PROCEDURE — 80053 COMPREHEN METABOLIC PANEL: CPT

## 2020-09-23 PROCEDURE — 83918 ORGANIC ACIDS TOTAL QUANT: CPT

## 2020-09-23 PROCEDURE — 84165 PROTEIN E-PHORESIS SERUM: CPT

## 2020-09-23 PROCEDURE — 86334 IMMUNOFIX E-PHORESIS SERUM: CPT | Performed by: PATHOLOGY

## 2020-09-24 DIAGNOSIS — I50.42 CHRONIC COMBINED SYSTOLIC AND DIASTOLIC CONGESTIVE HEART FAILURE (HCC): Chronic | ICD-10-CM

## 2020-09-24 DIAGNOSIS — E78.5 HYPERLIPIDEMIA, UNSPECIFIED HYPERLIPIDEMIA TYPE: Primary | ICD-10-CM

## 2020-09-24 LAB
ALBUMIN SERPL ELPH-MCNC: 4.08 G/DL (ref 3.5–5)
ALBUMIN SERPL ELPH-MCNC: 59.1 % (ref 52–65)
ALPHA1 GLOB SERPL ELPH-MCNC: 0.41 G/DL (ref 0.1–0.4)
ALPHA1 GLOB SERPL ELPH-MCNC: 5.9 % (ref 2.5–5)
ALPHA2 GLOB SERPL ELPH-MCNC: 0.84 G/DL (ref 0.4–1.2)
ALPHA2 GLOB SERPL ELPH-MCNC: 12.2 % (ref 7–13)
BETA GLOB ABNORMAL SERPL ELPH-MCNC: 0.37 G/DL (ref 0.4–0.8)
BETA1 GLOB SERPL ELPH-MCNC: 5.3 % (ref 5–13)
BETA2 GLOB SERPL ELPH-MCNC: 3.5 % (ref 2–8)
BETA2+GAMMA GLOB SERPL ELPH-MCNC: 0.24 G/DL (ref 0.2–0.5)
GAMMA GLOB ABNORMAL SERPL ELPH-MCNC: 0.97 G/DL (ref 0.5–1.6)
GAMMA GLOB SERPL ELPH-MCNC: 14 % (ref 12–22)
IGG/ALB SER: 1.44 {RATIO} (ref 1.1–1.8)
KAPPA LC FREE SER-MCNC: 16.4 MG/L (ref 3.3–19.4)
KAPPA LC FREE/LAMBDA FREE SER: 0.02 {RATIO} (ref 0.26–1.65)
LAMBDA LC FREE SERPL-MCNC: 668.4 MG/L (ref 5.7–26.3)
M PEAK ID 2: 7.8 %
M PROTEIN 1 MFR SERPL ELPH: 1 %
M PROTEIN 1 SERPL ELPH-MCNC: 0.07 G/DL
M PROTEIN 2 SERPL ELPH-MCNC: 0.54 G/DL
PROT PATTERN SERPL ELPH-IMP: ABNORMAL
PROT SERPL-MCNC: 6.9 G/DL (ref 6.4–8.2)

## 2020-09-24 RX ORDER — ROSUVASTATIN CALCIUM 40 MG/1
40 TABLET, COATED ORAL DAILY
Qty: 90 TABLET | Refills: 2 | Status: SHIPPED | OUTPATIENT
Start: 2020-09-24

## 2020-09-24 RX ORDER — SACUBITRIL AND VALSARTAN 24; 26 MG/1; MG/1
1 TABLET, FILM COATED ORAL 2 TIMES DAILY
Qty: 180 TABLET | Refills: 2 | Status: SHIPPED | OUTPATIENT
Start: 2020-09-24 | End: 2021-09-24

## 2020-09-24 RX ORDER — CARVEDILOL 6.25 MG/1
6.25 TABLET ORAL 2 TIMES DAILY WITH MEALS
Qty: 360 TABLET | Refills: 2 | Status: SHIPPED | OUTPATIENT
Start: 2020-09-24

## 2020-09-25 LAB — INTERPRETATION UR IFE-IMP: NORMAL

## 2020-09-26 LAB
METHYLMALONATE SERPL-SCNC: 232 NMOL/L (ref 0–378)
SL AMB DISCLAIMER: NORMAL

## 2020-09-28 DIAGNOSIS — D46.9 MDS (MYELODYSPLASTIC SYNDROME) (HCC): ICD-10-CM

## 2020-09-28 DIAGNOSIS — D61.818 PANCYTOPENIA (HCC): Primary | ICD-10-CM

## 2020-10-03 ENCOUNTER — HOSPITAL ENCOUNTER (OUTPATIENT)
Dept: RADIOLOGY | Facility: HOSPITAL | Age: 75
Discharge: HOME | End: 2020-10-03
Payer: COMMERCIAL

## 2020-10-03 ENCOUNTER — APPOINTMENT (OUTPATIENT)
Dept: LAB | Facility: CLINIC | Age: 75
End: 2020-10-03
Payer: COMMERCIAL

## 2020-10-03 ENCOUNTER — TRANSCRIBE ORDERS (OUTPATIENT)
Dept: LAB | Facility: CLINIC | Age: 75
End: 2020-10-03

## 2020-10-03 DIAGNOSIS — D61.818 PANCYTOPENIA (HCC): ICD-10-CM

## 2020-10-03 DIAGNOSIS — D46.9 MDS (MYELODYSPLASTIC SYNDROME) (HCC): ICD-10-CM

## 2020-10-03 LAB
ALBUMIN SERPL BCP-MCNC: 3.5 G/DL (ref 3.5–5)
ALP SERPL-CCNC: 91 U/L (ref 46–116)
ALT SERPL W P-5'-P-CCNC: 11 U/L (ref 12–78)
ANION GAP SERPL CALCULATED.3IONS-SCNC: 7 MMOL/L (ref 4–13)
AST SERPL W P-5'-P-CCNC: 10 U/L (ref 5–45)
BASOPHILS # BLD AUTO: 0.02 THOUSANDS/ΜL (ref 0–0.1)
BASOPHILS NFR BLD AUTO: 1 % (ref 0–1)
BILIRUB SERPL-MCNC: 0.78 MG/DL (ref 0.2–1)
BUN SERPL-MCNC: 8 MG/DL (ref 5–25)
CALCIUM SERPL-MCNC: 8.8 MG/DL (ref 8.3–10.1)
CHLORIDE SERPL-SCNC: 100 MMOL/L (ref 100–108)
CO2 SERPL-SCNC: 31 MMOL/L (ref 21–32)
CREAT SERPL-MCNC: 0.86 MG/DL (ref 0.6–1.3)
EOSINOPHIL # BLD AUTO: 0.19 THOUSAND/ΜL (ref 0–0.61)
EOSINOPHIL NFR BLD AUTO: 4 % (ref 0–6)
ERYTHROCYTE [DISTWIDTH] IN BLOOD BY AUTOMATED COUNT: 14.5 % (ref 11.6–15.1)
GFR SERPL CREATININE-BSD FRML MDRD: 85 ML/MIN/1.73SQ M
GLUCOSE P FAST SERPL-MCNC: 106 MG/DL (ref 65–99)
HCT VFR BLD AUTO: 36.9 % (ref 36.5–49.3)
HGB BLD-MCNC: 11.5 G/DL (ref 12–17)
IMM GRANULOCYTES # BLD AUTO: 0.01 THOUSAND/UL (ref 0–0.2)
IMM GRANULOCYTES NFR BLD AUTO: 0 % (ref 0–2)
LYMPHOCYTES # BLD AUTO: 1.1 THOUSANDS/ΜL (ref 0.6–4.47)
LYMPHOCYTES NFR BLD AUTO: 26 % (ref 14–44)
MCH RBC QN AUTO: 32.6 PG (ref 26.8–34.3)
MCHC RBC AUTO-ENTMCNC: 31.2 G/DL (ref 31.4–37.4)
MCV RBC AUTO: 105 FL (ref 82–98)
MONOCYTES # BLD AUTO: 0.62 THOUSAND/ΜL (ref 0.17–1.22)
MONOCYTES NFR BLD AUTO: 14 % (ref 4–12)
NEUTROPHILS # BLD AUTO: 2.37 THOUSANDS/ΜL (ref 1.85–7.62)
NEUTS SEG NFR BLD AUTO: 55 % (ref 43–75)
NRBC BLD AUTO-RTO: 0 /100 WBCS
PLATELET # BLD AUTO: 114 THOUSANDS/UL (ref 149–390)
PMV BLD AUTO: 11.6 FL (ref 8.9–12.7)
POTASSIUM SERPL-SCNC: 4 MMOL/L (ref 3.5–5.3)
PROT SERPL-MCNC: 7.1 G/DL (ref 6.4–8.2)
RBC # BLD AUTO: 3.53 MILLION/UL (ref 3.88–5.62)
SODIUM SERPL-SCNC: 138 MMOL/L (ref 136–145)
WBC # BLD AUTO: 4.31 THOUSAND/UL (ref 4.31–10.16)

## 2020-10-03 PROCEDURE — 36415 COLL VENOUS BLD VENIPUNCTURE: CPT

## 2020-10-03 PROCEDURE — 85025 COMPLETE CBC W/AUTO DIFF WBC: CPT

## 2020-10-03 PROCEDURE — 80053 COMPREHEN METABOLIC PANEL: CPT

## 2020-10-03 PROCEDURE — 77075 RADEX OSSEOUS SURVEY COMPL: CPT

## 2020-10-15 ENCOUNTER — IN-CLINIC DEVICE VISIT (OUTPATIENT)
Dept: CARDIOLOGY CLINIC | Facility: CLINIC | Age: 75
End: 2020-10-15
Payer: COMMERCIAL

## 2020-10-15 ENCOUNTER — PREP FOR PROCEDURE (OUTPATIENT)
Dept: INTERVENTIONAL RADIOLOGY/VASCULAR | Facility: CLINIC | Age: 75
End: 2020-10-15

## 2020-10-15 ENCOUNTER — OFFICE VISIT (OUTPATIENT)
Dept: HEMATOLOGY ONCOLOGY | Facility: CLINIC | Age: 75
End: 2020-10-15
Payer: COMMERCIAL

## 2020-10-15 VITALS
TEMPERATURE: 97.6 F | DIASTOLIC BLOOD PRESSURE: 68 MMHG | OXYGEN SATURATION: 99 % | RESPIRATION RATE: 16 BRPM | SYSTOLIC BLOOD PRESSURE: 112 MMHG | HEART RATE: 75 BPM | HEIGHT: 69 IN | WEIGHT: 132 LBS | BODY MASS INDEX: 19.55 KG/M2

## 2020-10-15 DIAGNOSIS — C90.00 MULTIPLE MYELOMA NOT HAVING ACHIEVED REMISSION (HCC): Primary | ICD-10-CM

## 2020-10-15 DIAGNOSIS — C90.00 MULTIPLE MYELOMA, REMISSION STATUS UNSPECIFIED (HCC): Primary | ICD-10-CM

## 2020-10-15 DIAGNOSIS — Z95.810 BIVENTRICULAR ICD (IMPLANTABLE CARDIOVERTER-DEFIBRILLATOR) IN PLACE: Primary | ICD-10-CM

## 2020-10-15 DIAGNOSIS — D61.818 PANCYTOPENIA (HCC): ICD-10-CM

## 2020-10-15 PROCEDURE — 1160F RVW MEDS BY RX/DR IN RCRD: CPT | Performed by: NURSE PRACTITIONER

## 2020-10-15 PROCEDURE — 1036F TOBACCO NON-USER: CPT | Performed by: NURSE PRACTITIONER

## 2020-10-15 PROCEDURE — 99215 OFFICE O/P EST HI 40 MIN: CPT | Performed by: NURSE PRACTITIONER

## 2020-10-15 PROCEDURE — 93284 PRGRMG EVAL IMPLANTABLE DFB: CPT | Performed by: INTERNAL MEDICINE

## 2020-10-15 PROCEDURE — 3078F DIAST BP <80 MM HG: CPT | Performed by: NURSE PRACTITIONER

## 2020-10-23 ENCOUNTER — HOSPITAL ENCOUNTER (OUTPATIENT)
Dept: RADIOLOGY | Facility: HOSPITAL | Age: 75
Discharge: HOME | End: 2020-10-23
Attending: RADIOLOGY | Admitting: RADIOLOGY
Payer: COMMERCIAL

## 2020-10-23 VITALS
OXYGEN SATURATION: 100 % | DIASTOLIC BLOOD PRESSURE: 80 MMHG | RESPIRATION RATE: 16 BRPM | HEART RATE: 91 BPM | SYSTOLIC BLOOD PRESSURE: 113 MMHG

## 2020-10-23 DIAGNOSIS — C90.00 MULTIPLE MYELOMA, REMISSION STATUS UNSPECIFIED (HCC): ICD-10-CM

## 2020-10-23 PROCEDURE — 88365 INSITU HYBRIDIZATION (FISH): CPT | Performed by: PATHOLOGY

## 2020-10-23 PROCEDURE — 77012 CT SCAN FOR NEEDLE BIOPSY: CPT | Performed by: RADIOLOGY

## 2020-10-23 PROCEDURE — 88341 IMHCHEM/IMCYTCHM EA ADD ANTB: CPT | Performed by: PATHOLOGY

## 2020-10-23 PROCEDURE — 38222 DX BONE MARROW BX & ASPIR: CPT

## 2020-10-23 PROCEDURE — 38222 DX BONE MARROW BX & ASPIR: CPT | Performed by: RADIOLOGY

## 2020-10-23 PROCEDURE — 88364 INSITU HYBRIDIZATION (FISH): CPT | Performed by: PATHOLOGY

## 2020-10-23 PROCEDURE — 99152 MOD SED SAME PHYS/QHP 5/>YRS: CPT | Performed by: RADIOLOGY

## 2020-10-23 PROCEDURE — 88311 DECALCIFY TISSUE: CPT | Performed by: PATHOLOGY

## 2020-10-23 PROCEDURE — 85097 BONE MARROW INTERPRETATION: CPT | Performed by: PATHOLOGY

## 2020-10-23 PROCEDURE — 88313 SPECIAL STAINS GROUP 2: CPT | Performed by: PATHOLOGY

## 2020-10-23 PROCEDURE — 88360 TUMOR IMMUNOHISTOCHEM/MANUAL: CPT | Performed by: PATHOLOGY

## 2020-10-23 PROCEDURE — 88333 PATH CONSLTJ SURG CYTO XM 1: CPT | Performed by: PATHOLOGY

## 2020-10-23 PROCEDURE — 99152 MOD SED SAME PHYS/QHP 5/>YRS: CPT

## 2020-10-23 PROCEDURE — 88342 IMHCHEM/IMCYTCHM 1ST ANTB: CPT | Performed by: PATHOLOGY

## 2020-10-23 PROCEDURE — 77012 CT SCAN FOR NEEDLE BIOPSY: CPT

## 2020-10-23 PROCEDURE — 99153 MOD SED SAME PHYS/QHP EA: CPT

## 2020-10-23 PROCEDURE — 88305 TISSUE EXAM BY PATHOLOGIST: CPT | Performed by: PATHOLOGY

## 2020-10-23 RX ORDER — LIDOCAINE WITH 8.4% SOD BICARB 0.9%(10ML)
SYRINGE (ML) INJECTION CODE/TRAUMA/SEDATION MEDICATION
Status: COMPLETED | OUTPATIENT
Start: 2020-10-23 | End: 2020-10-23

## 2020-10-23 RX ORDER — SODIUM CHLORIDE 9 MG/ML
50 INJECTION, SOLUTION INTRAVENOUS CONTINUOUS
Status: DISCONTINUED | OUTPATIENT
Start: 2020-10-23 | End: 2020-10-23 | Stop reason: HOSPADM

## 2020-10-23 RX ORDER — MIDAZOLAM HYDROCHLORIDE 2 MG/2ML
INJECTION, SOLUTION INTRAMUSCULAR; INTRAVENOUS CODE/TRAUMA/SEDATION MEDICATION
Status: COMPLETED | OUTPATIENT
Start: 2020-10-23 | End: 2020-10-23

## 2020-10-23 RX ORDER — FENTANYL CITRATE 50 UG/ML
INJECTION, SOLUTION INTRAMUSCULAR; INTRAVENOUS CODE/TRAUMA/SEDATION MEDICATION
Status: COMPLETED | OUTPATIENT
Start: 2020-10-23 | End: 2020-10-23

## 2020-10-23 RX ADMIN — Medication 10 ML: at 10:38

## 2020-10-23 RX ADMIN — MIDAZOLAM 0.5 MG: 1 INJECTION INTRAMUSCULAR; INTRAVENOUS at 10:30

## 2020-10-23 RX ADMIN — FENTANYL CITRATE 25 MCG: 50 INJECTION, SOLUTION INTRAMUSCULAR; INTRAVENOUS at 10:31

## 2020-10-23 RX ADMIN — MIDAZOLAM 0.5 MG: 1 INJECTION INTRAMUSCULAR; INTRAVENOUS at 10:27

## 2020-10-23 RX ADMIN — FENTANYL CITRATE 25 MCG: 50 INJECTION, SOLUTION INTRAMUSCULAR; INTRAVENOUS at 10:27

## 2020-10-26 LAB — SCAN RESULT: NORMAL

## 2020-10-28 LAB — SCAN RESULT: NORMAL

## 2020-11-02 LAB
MISCELLANEOUS LAB TEST RESULT: NORMAL
MISCELLANEOUS LAB TEST RESULT: NORMAL

## 2020-11-05 ENCOUNTER — OFFICE VISIT (OUTPATIENT)
Dept: HEMATOLOGY ONCOLOGY | Facility: CLINIC | Age: 75
End: 2020-11-05
Payer: COMMERCIAL

## 2020-11-05 VITALS
DIASTOLIC BLOOD PRESSURE: 82 MMHG | HEIGHT: 69 IN | BODY MASS INDEX: 19.7 KG/M2 | HEART RATE: 99 BPM | RESPIRATION RATE: 16 BRPM | SYSTOLIC BLOOD PRESSURE: 122 MMHG | WEIGHT: 133 LBS | TEMPERATURE: 98.1 F | OXYGEN SATURATION: 92 %

## 2020-11-05 DIAGNOSIS — C90.00 MULTIPLE MYELOMA NOT HAVING ACHIEVED REMISSION (HCC): Primary | ICD-10-CM

## 2020-11-05 PROCEDURE — 99214 OFFICE O/P EST MOD 30 MIN: CPT | Performed by: INTERNAL MEDICINE

## 2020-11-05 RX ORDER — DEXAMETHASONE 4 MG/1
20 TABLET ORAL ONCE
Status: CANCELLED
Start: 2020-12-02

## 2020-11-05 RX ORDER — ACYCLOVIR 400 MG/1
400 TABLET ORAL 2 TIMES DAILY
Qty: 180 TABLET | Refills: 3 | Status: SHIPPED | OUTPATIENT
Start: 2020-11-05 | End: 2021-10-31

## 2020-11-05 RX ORDER — DEXAMETHASONE 4 MG/1
20 TABLET ORAL ONCE
Status: CANCELLED
Start: 2020-11-18

## 2020-11-05 RX ORDER — DEXAMETHASONE 4 MG/1
20 TABLET ORAL ONCE
Status: CANCELLED
Start: 2020-11-25

## 2020-11-05 RX ORDER — DEXAMETHASONE 4 MG/1
20 TABLET ORAL ONCE
Status: CANCELLED
Start: 2020-12-09

## 2020-11-06 ENCOUNTER — TELEPHONE (OUTPATIENT)
Dept: HEMATOLOGY ONCOLOGY | Facility: CLINIC | Age: 75
End: 2020-11-06

## 2020-11-15 NOTE — ANESTHESIA PREPROCEDURE EVALUATION
Review of Systems/Medical History  Patient summary reviewed  Chart reviewed      Cardiovascular  Exercise tolerance (METS): <4,  Hyperlipidemia, Hypertension controlled, Past MI > 6 months, CAD , Cardiac stents > 1 year CHF ,   Comment: ECHO: 5/7092 Systolic function was moderately reduced by visual assessment  Ejection fraction was estimated in the range of 30 % to 35 %  There was mild diffuse hypokinesis  There was akinesis of the mid anteroseptal, apical septal, and apical wall(s)  Doppler parameters were consistent with abnormal left ventricular relaxation (grade 1 diastolic dysfunction)  MI in 2007 s/p 1 stent  ,  Pulmonary  Smoker ex-smoker  , COPD severe- O2 dependent , Shortness of breath, Oxygen dependent nasal cannula,   Comment: Severe COPD on 3L continuous home oxygen  FEV1 19% predicted  GI/Hepatic  Negative GI/hepatic ROS    Hiatal hernia,        Prostatic disorder, benign prostatic hyperplasia       Endo/Other  Negative endo/other ROS      GYN       Hematology  Negative hematology ROS      Musculoskeletal  Negative musculoskeletal ROS        Neurology  Negative neurology ROS      Psychology   Negative psychology ROS              Physical Exam    Airway    Mallampati score: II  TM Distance: >3 FB  Neck ROM: full     Dental   No notable dental hx     Cardiovascular  Rhythm: regular, Rate: normal,     Pulmonary  Breath sounds clear to auscultation,     Other Findings  Upper bridge      Anesthesia Plan  ASA Score- 3     Anesthesia Type- spinal with ASA Monitors  Additional Monitors:   Airway Plan:     Comment: Plan for possible spinal discussed with the patient with minimal sedation due to his respiratory status vs GA  However, final plan will be decided on the day of surgery  PT/PTT/INR to be drawn the morning of surgery  8/14/18    Patient has significant reduction in ejection fraction noted on echo of 5/2018  EF 30-35%  Discussed GA with the patient   Isabel Becerra Factors-    Induction- intravenous  Postoperative Plan-     Informed Consent- Anesthetic plan and risks discussed with patient  resilient/elastic

## 2020-11-16 ENCOUNTER — TELEPHONE (OUTPATIENT)
Dept: INTERNAL MEDICINE CLINIC | Facility: CLINIC | Age: 75
End: 2020-11-16

## 2020-11-18 ENCOUNTER — HOSPITAL ENCOUNTER (OUTPATIENT)
Dept: INFUSION CENTER | Facility: CLINIC | Age: 75
End: 2020-11-18

## 2021-12-07 NOTE — ASSESSMENT & PLAN NOTE
Once we determine whether lung transplant is an option, patient can work with cardiology to decide on appropriate timing for ongoing cardiac workup and ICD placement  However, it seems that the patient will need to move forward with this option regardless of transplant decision  Statement Selected

## 2021-12-22 NOTE — PROGRESS NOTES
Exercise session detail PT requesting Diflucan sent to pharmacy on file.    Bowel sounds present and normal/Abdomen soft/No distension/No tenderness/No masses or organomegaly/No hernia(s)

## 2025-07-11 NOTE — TELEPHONE ENCOUNTER
Can you help me with this? Patient was hospitalized at the end of May and is chronically on home oxygen  I do not have any new oxygen saturations to place on a prescription  Do I even need or can I just order without? Thank you  Tosha Pérez appreciate it!
If she already gets her oxygen from them then no   You can just put a order in for poc
Pt is requesting an order for oxygen concentrator and portable oxygen concentrator with supplies  He also needs an 8 hr emergency tank  Please send to [address]   [phone]
23:21

## (undated) DEVICE — BAG URINE DRAINAGE 4000ML CONTINUOUS IRR

## (undated) DEVICE — Device: Brand: OLYMPUS

## (undated) DEVICE — CATH FOLEY HEMATURIA 22FR 30ML 3 WAY LUBRICATH

## (undated) DEVICE — GLOVE SRG BIOGEL 7.5

## (undated) DEVICE — SKIN MARKER DUAL TIP WITH RULER CAP, FLEXIBLE RULER AND LABELS: Brand: DEVON

## (undated) DEVICE — UROCATCH BAG

## (undated) DEVICE — TUBING SUCTION 5MM X 12 FT

## (undated) DEVICE — EVACUATOR BLADDER ELLIK DISP STRL

## (undated) DEVICE — INVIEW CLEAR LEGGINGS: Brand: CONVERTORS

## (undated) DEVICE — PACK TUR

## (undated) DEVICE — BASIC SINGLE BASIN-LF: Brand: MEDLINE INDUSTRIES, INC.

## (undated) DEVICE — SCD SEQUENTIAL COMPRESSION COMFORT SLEEVE MEDIUM KNEE LENGTH: Brand: KENDALL SCD